# Patient Record
Sex: FEMALE | Race: WHITE | NOT HISPANIC OR LATINO
[De-identification: names, ages, dates, MRNs, and addresses within clinical notes are randomized per-mention and may not be internally consistent; named-entity substitution may affect disease eponyms.]

---

## 2017-01-06 ENCOUNTER — APPOINTMENT (OUTPATIENT)
Dept: INTERNAL MEDICINE | Facility: CLINIC | Age: 82
End: 2017-01-06

## 2017-01-10 ENCOUNTER — APPOINTMENT (OUTPATIENT)
Dept: INTERNAL MEDICINE | Facility: CLINIC | Age: 82
End: 2017-01-10

## 2017-01-10 ENCOUNTER — NON-APPOINTMENT (OUTPATIENT)
Age: 82
End: 2017-01-10

## 2017-01-10 VITALS
TEMPERATURE: 97.6 F | WEIGHT: 129 LBS | DIASTOLIC BLOOD PRESSURE: 70 MMHG | HEART RATE: 73 BPM | BODY MASS INDEX: 21.75 KG/M2 | SYSTOLIC BLOOD PRESSURE: 140 MMHG | OXYGEN SATURATION: 95 % | HEIGHT: 64.5 IN

## 2017-01-10 DIAGNOSIS — N63 UNSPECIFIED LUMP IN BREAST: ICD-10-CM

## 2017-01-10 DIAGNOSIS — K21.9 GASTRO-ESOPHAGEAL REFLUX DISEASE W/OUT ESOPHAGITIS: ICD-10-CM

## 2017-01-10 DIAGNOSIS — Z23 ENCOUNTER FOR IMMUNIZATION: ICD-10-CM

## 2017-01-10 DIAGNOSIS — Z87.09 PERSONAL HISTORY OF OTHER DISEASES OF THE RESPIRATORY SYSTEM: ICD-10-CM

## 2017-01-19 ENCOUNTER — MEDICATION RENEWAL (OUTPATIENT)
Age: 82
End: 2017-01-19

## 2017-01-20 ENCOUNTER — CHART COPY (OUTPATIENT)
Age: 82
End: 2017-01-20

## 2017-01-20 RX ORDER — METOPROLOL SUCCINATE 25 MG/1
25 TABLET, EXTENDED RELEASE ORAL
Refills: 0 | Status: ACTIVE | COMMUNITY

## 2017-01-27 ENCOUNTER — MEDICATION RENEWAL (OUTPATIENT)
Age: 82
End: 2017-01-27

## 2017-03-30 ENCOUNTER — APPOINTMENT (OUTPATIENT)
Dept: DERMATOLOGY | Facility: CLINIC | Age: 82
End: 2017-03-30

## 2017-04-28 ENCOUNTER — MEDICATION RENEWAL (OUTPATIENT)
Age: 82
End: 2017-04-28

## 2017-07-12 ENCOUNTER — MEDICATION RENEWAL (OUTPATIENT)
Age: 82
End: 2017-07-12

## 2017-07-28 ENCOUNTER — MEDICATION RENEWAL (OUTPATIENT)
Age: 82
End: 2017-07-28

## 2017-08-15 ENCOUNTER — APPOINTMENT (OUTPATIENT)
Dept: INTERNAL MEDICINE | Facility: CLINIC | Age: 82
End: 2017-08-15
Payer: MEDICARE

## 2017-08-15 VITALS
OXYGEN SATURATION: 96 % | WEIGHT: 131 LBS | DIASTOLIC BLOOD PRESSURE: 80 MMHG | HEART RATE: 78 BPM | TEMPERATURE: 97.6 F | HEIGHT: 64.5 IN | SYSTOLIC BLOOD PRESSURE: 110 MMHG | BODY MASS INDEX: 22.09 KG/M2

## 2017-08-15 DIAGNOSIS — R26.89 OTHER ABNORMALITIES OF GAIT AND MOBILITY: ICD-10-CM

## 2017-08-15 DIAGNOSIS — H61.20 IMPACTED CERUMEN, UNSPECIFIED EAR: ICD-10-CM

## 2017-08-15 PROCEDURE — 99215 OFFICE O/P EST HI 40 MIN: CPT

## 2017-08-15 RX ORDER — OXYCODONE AND ACETAMINOPHEN 5; 325 MG/1; MG/1
5-325 TABLET ORAL
Qty: 6 | Refills: 0 | Status: COMPLETED | COMMUNITY
Start: 2017-05-03

## 2017-11-02 ENCOUNTER — APPOINTMENT (OUTPATIENT)
Dept: INTERNAL MEDICINE | Facility: CLINIC | Age: 82
End: 2017-11-02
Payer: MEDICARE

## 2017-11-02 DIAGNOSIS — Z23 ENCOUNTER FOR IMMUNIZATION: ICD-10-CM

## 2017-11-02 PROCEDURE — 90662 IIV NO PRSV INCREASED AG IM: CPT

## 2017-11-02 PROCEDURE — G0008: CPT

## 2017-12-01 ENCOUNTER — RX RENEWAL (OUTPATIENT)
Age: 82
End: 2017-12-01

## 2017-12-05 ENCOUNTER — APPOINTMENT (OUTPATIENT)
Dept: OBGYN | Facility: CLINIC | Age: 82
End: 2017-12-05
Payer: MEDICARE

## 2017-12-05 VITALS
HEIGHT: 64.5 IN | DIASTOLIC BLOOD PRESSURE: 80 MMHG | WEIGHT: 130 LBS | SYSTOLIC BLOOD PRESSURE: 110 MMHG | BODY MASS INDEX: 21.93 KG/M2

## 2017-12-05 DIAGNOSIS — Z01.419 ENCOUNTER FOR GYNECOLOGICAL EXAMINATION (GENERAL) (ROUTINE) W/OUT ABNORMAL FINDINGS: ICD-10-CM

## 2017-12-05 PROCEDURE — 82270 OCCULT BLOOD FECES: CPT

## 2017-12-05 PROCEDURE — G0101: CPT

## 2017-12-05 PROCEDURE — 99203 OFFICE O/P NEW LOW 30 MIN: CPT | Mod: 25

## 2017-12-27 ENCOUNTER — MEDICATION RENEWAL (OUTPATIENT)
Age: 82
End: 2017-12-27

## 2018-04-09 ENCOUNTER — APPOINTMENT (OUTPATIENT)
Dept: INTERNAL MEDICINE | Facility: CLINIC | Age: 83
End: 2018-04-09
Payer: MEDICARE

## 2018-04-09 ENCOUNTER — NON-APPOINTMENT (OUTPATIENT)
Age: 83
End: 2018-04-09

## 2018-04-09 VITALS
WEIGHT: 136 LBS | OXYGEN SATURATION: 94 % | SYSTOLIC BLOOD PRESSURE: 120 MMHG | DIASTOLIC BLOOD PRESSURE: 60 MMHG | BODY MASS INDEX: 22.94 KG/M2 | HEIGHT: 64.5 IN | TEMPERATURE: 98 F | HEART RATE: 82 BPM

## 2018-04-09 DIAGNOSIS — J30.1 ALLERGIC RHINITIS DUE TO POLLEN: ICD-10-CM

## 2018-04-09 DIAGNOSIS — Z87.19 PERSONAL HISTORY OF OTHER DISEASES OF THE DIGESTIVE SYSTEM: ICD-10-CM

## 2018-04-09 DIAGNOSIS — H10.12 ACUTE ATOPIC CONJUNCTIVITIS, LEFT EYE: ICD-10-CM

## 2018-04-09 PROCEDURE — G0444 DEPRESSION SCREEN ANNUAL: CPT

## 2018-04-09 PROCEDURE — 93000 ELECTROCARDIOGRAM COMPLETE: CPT

## 2018-04-09 PROCEDURE — 92552 PURE TONE AUDIOMETRY AIR: CPT

## 2018-04-09 PROCEDURE — G0439: CPT | Mod: 25

## 2018-04-09 PROCEDURE — 94010 BREATHING CAPACITY TEST: CPT

## 2018-04-09 PROCEDURE — 36415 COLL VENOUS BLD VENIPUNCTURE: CPT

## 2018-04-09 RX ORDER — TRIAMCINOLONE ACETONIDE 1 MG/G
0.1 CREAM TOPICAL
Qty: 30 | Refills: 0 | Status: COMPLETED | COMMUNITY
Start: 2017-10-31

## 2018-04-09 RX ORDER — NYSTATIN 100000 [USP'U]/G
100000 CREAM TOPICAL
Qty: 30 | Refills: 0 | Status: COMPLETED | COMMUNITY
Start: 2017-10-31

## 2018-04-10 ENCOUNTER — RESULT REVIEW (OUTPATIENT)
Age: 83
End: 2018-04-10

## 2018-04-10 ENCOUNTER — APPOINTMENT (OUTPATIENT)
Dept: DERMATOLOGY | Facility: CLINIC | Age: 83
End: 2018-04-10
Payer: MEDICARE

## 2018-04-10 LAB
APPEARANCE: CLEAR
BASOPHILS # BLD AUTO: 0.07 K/UL
BASOPHILS NFR BLD AUTO: 1.3 %
BILIRUBIN URINE: NEGATIVE
BLOOD URINE: NEGATIVE
COLOR: YELLOW
EOSINOPHIL # BLD AUTO: 0.23 K/UL
EOSINOPHIL NFR BLD AUTO: 4.4 %
GLUCOSE QUALITATIVE U: NEGATIVE MG/DL
HCT VFR BLD CALC: 45 %
HGB BLD-MCNC: 14.2 G/DL
IMM GRANULOCYTES NFR BLD AUTO: 0.2 %
KETONES URINE: ABNORMAL
LEUKOCYTE ESTERASE URINE: NEGATIVE
LYMPHOCYTES # BLD AUTO: 1.71 K/UL
LYMPHOCYTES NFR BLD AUTO: 32.8 %
MAN DIFF?: NORMAL
MCHC RBC-ENTMCNC: 30.3 PG
MCHC RBC-ENTMCNC: 31.6 GM/DL
MCV RBC AUTO: 96.2 FL
MONOCYTES # BLD AUTO: 0.58 K/UL
MONOCYTES NFR BLD AUTO: 11.1 %
NEUTROPHILS # BLD AUTO: 2.61 K/UL
NEUTROPHILS NFR BLD AUTO: 50.2 %
NITRITE URINE: NEGATIVE
PH URINE: 5
PLATELET # BLD AUTO: 221 K/UL
PROTEIN URINE: NEGATIVE MG/DL
RBC # BLD: 4.68 M/UL
RBC # FLD: 13.5 %
SPECIFIC GRAVITY URINE: 1.02
UROBILINOGEN URINE: 1 MG/DL
WBC # FLD AUTO: 5.21 K/UL

## 2018-04-10 PROCEDURE — 17260 DSTRJ MAL LES T/A/L 0.5 CM/<: CPT

## 2018-04-10 PROCEDURE — 99213 OFFICE O/P EST LOW 20 MIN: CPT | Mod: 25

## 2018-04-11 LAB
25(OH)D3 SERPL-MCNC: 26.6 NG/ML
ALBUMIN SERPL ELPH-MCNC: 4.2 G/DL
ALP BLD-CCNC: 92 U/L
ALT SERPL-CCNC: 4 U/L
ANION GAP SERPL CALC-SCNC: 14 MMOL/L
AST SERPL-CCNC: 18 U/L
BILIRUB SERPL-MCNC: 0.9 MG/DL
BUN SERPL-MCNC: 17 MG/DL
CALCIUM SERPL-MCNC: 9.2 MG/DL
CHLORIDE SERPL-SCNC: 105 MMOL/L
CHOLEST SERPL-MCNC: 158 MG/DL
CHOLEST/HDLC SERPL: 1.9 RATIO
CK SERPL-CCNC: 81 U/L
CO2 SERPL-SCNC: 26 MMOL/L
CREAT SERPL-MCNC: 0.93 MG/DL
GLUCOSE SERPL-MCNC: 93 MG/DL
HDLC SERPL-MCNC: 83 MG/DL
LDLC SERPL CALC-MCNC: 66 MG/DL
POTASSIUM SERPL-SCNC: 3.9 MMOL/L
PROT SERPL-MCNC: 7 G/DL
SODIUM SERPL-SCNC: 145 MMOL/L
TRIGL SERPL-MCNC: 43 MG/DL
TSH SERPL-ACNC: 1.37 UIU/ML
URATE SERPL-MCNC: 4.9 MG/DL

## 2018-04-13 ENCOUNTER — MEDICATION RENEWAL (OUTPATIENT)
Age: 83
End: 2018-04-13

## 2018-04-25 ENCOUNTER — MEDICATION RENEWAL (OUTPATIENT)
Age: 83
End: 2018-04-25

## 2018-05-08 ENCOUNTER — APPOINTMENT (OUTPATIENT)
Dept: UROGYNECOLOGY | Facility: CLINIC | Age: 83
End: 2018-05-08
Payer: MEDICARE

## 2018-05-08 VITALS
WEIGHT: 133 LBS | DIASTOLIC BLOOD PRESSURE: 72 MMHG | HEIGHT: 64 IN | BODY MASS INDEX: 22.71 KG/M2 | SYSTOLIC BLOOD PRESSURE: 140 MMHG

## 2018-05-08 DIAGNOSIS — Z85.9 PERSONAL HISTORY OF MALIGNANT NEOPLASM, UNSPECIFIED: ICD-10-CM

## 2018-05-08 DIAGNOSIS — Z86.79 PERSONAL HISTORY OF OTHER DISEASES OF THE CIRCULATORY SYSTEM: ICD-10-CM

## 2018-05-08 DIAGNOSIS — Z87.09 PERSONAL HISTORY OF OTHER DISEASES OF THE RESPIRATORY SYSTEM: ICD-10-CM

## 2018-05-08 DIAGNOSIS — Z87.19 PERSONAL HISTORY OF OTHER DISEASES OF THE DIGESTIVE SYSTEM: ICD-10-CM

## 2018-05-08 DIAGNOSIS — I51.9 HEART DISEASE, UNSPECIFIED: ICD-10-CM

## 2018-05-08 DIAGNOSIS — Z60.2 PROBLEMS RELATED TO LIVING ALONE: ICD-10-CM

## 2018-05-08 DIAGNOSIS — M53.9 DORSOPATHY, UNSPECIFIED: ICD-10-CM

## 2018-05-08 LAB
BILIRUB UR QL STRIP: NEGATIVE
CLARITY UR: CLEAR
COLLECTION METHOD: NORMAL
GLUCOSE UR-MCNC: NEGATIVE
HCG UR QL: 0.2 EU/DL
HGB UR QL STRIP.AUTO: NORMAL
KETONES UR-MCNC: NEGATIVE
LEUKOCYTE ESTERASE UR QL STRIP: NEGATIVE
NITRITE UR QL STRIP: NEGATIVE
PH UR STRIP: 6
PROT UR STRIP-MCNC: NEGATIVE
SP GR UR STRIP: 1.02

## 2018-05-08 PROCEDURE — 81003 URINALYSIS AUTO W/O SCOPE: CPT | Mod: QW

## 2018-05-08 PROCEDURE — 51701 INSERT BLADDER CATHETER: CPT

## 2018-05-08 PROCEDURE — 99204 OFFICE O/P NEW MOD 45 MIN: CPT | Mod: 25

## 2018-05-08 PROCEDURE — 99214 OFFICE O/P EST MOD 30 MIN: CPT | Mod: 25

## 2018-05-08 SDOH — SOCIAL STABILITY - SOCIAL INSECURITY: PROBLEMS RELATED TO LIVING ALONE: Z60.2

## 2018-05-09 ENCOUNTER — RESULT REVIEW (OUTPATIENT)
Age: 83
End: 2018-05-09

## 2018-05-09 LAB
APPEARANCE: CLEAR
BACTERIA: NEGATIVE
BILIRUBIN URINE: NEGATIVE
BLOOD URINE: NEGATIVE
COLOR: YELLOW
GLUCOSE QUALITATIVE U: NEGATIVE MG/DL
HYALINE CASTS: 1 /LPF
KETONES URINE: NEGATIVE
LEUKOCYTE ESTERASE URINE: NEGATIVE
MICROSCOPIC-UA: NORMAL
NITRITE URINE: NEGATIVE
PH URINE: 6
PROTEIN URINE: NEGATIVE MG/DL
RED BLOOD CELLS URINE: 1 /HPF
SPECIFIC GRAVITY URINE: 1.01
SQUAMOUS EPITHELIAL CELLS: 2 /HPF
UROBILINOGEN URINE: NEGATIVE MG/DL
WHITE BLOOD CELLS URINE: 0 /HPF

## 2018-05-10 ENCOUNTER — RESULT REVIEW (OUTPATIENT)
Age: 83
End: 2018-05-10

## 2018-05-10 LAB
BACTERIA UR CULT: NORMAL
CORE LAB FLUID CYTOLOGY: NORMAL

## 2018-06-12 ENCOUNTER — APPOINTMENT (OUTPATIENT)
Dept: UROGYNECOLOGY | Facility: CLINIC | Age: 83
End: 2018-06-12

## 2018-06-26 ENCOUNTER — APPOINTMENT (OUTPATIENT)
Dept: UROGYNECOLOGY | Facility: CLINIC | Age: 83
End: 2018-06-26
Payer: MEDICARE

## 2018-06-26 LAB
BILIRUB UR QL STRIP: NEGATIVE
CLARITY UR: NORMAL
COLLECTION METHOD: NORMAL
GLUCOSE UR-MCNC: NEGATIVE
HCG UR QL: 0.2 EU/DL
HGB UR QL STRIP.AUTO: NORMAL
KETONES UR-MCNC: NEGATIVE
LEUKOCYTE ESTERASE UR QL STRIP: NEGATIVE
NITRITE UR QL STRIP: NEGATIVE
PH UR STRIP: 5.5
PROT UR STRIP-MCNC: NEGATIVE
SP GR UR STRIP: 1.02

## 2018-06-26 PROCEDURE — 99211 OFF/OP EST MAY X REQ PHY/QHP: CPT | Mod: 25

## 2018-06-26 PROCEDURE — 81003 URINALYSIS AUTO W/O SCOPE: CPT | Mod: QW

## 2018-06-26 PROCEDURE — 56605 BIOPSY OF VULVA/PERINEUM: CPT

## 2018-06-29 LAB — CORE LAB BIOPSY: NORMAL

## 2018-07-24 ENCOUNTER — APPOINTMENT (OUTPATIENT)
Dept: INTERNAL MEDICINE | Facility: CLINIC | Age: 83
End: 2018-07-24
Payer: MEDICARE

## 2018-07-24 VITALS
OXYGEN SATURATION: 94 % | SYSTOLIC BLOOD PRESSURE: 120 MMHG | WEIGHT: 132 LBS | HEART RATE: 81 BPM | DIASTOLIC BLOOD PRESSURE: 70 MMHG | BODY MASS INDEX: 22.53 KG/M2 | TEMPERATURE: 97.8 F | HEIGHT: 64 IN

## 2018-07-24 DIAGNOSIS — H61.20 IMPACTED CERUMEN, UNSPECIFIED EAR: ICD-10-CM

## 2018-07-24 PROCEDURE — 99214 OFFICE O/P EST MOD 30 MIN: CPT

## 2018-07-24 NOTE — PROCEDURE
[FreeTextEntry3] : Pt is here for ear lavage\par large amount of cerumen removed from the left ear > right ear.\par small amount of bleeding noted with some erythema on the TM. No other problems and the patient is hearing better.

## 2018-07-25 ENCOUNTER — MEDICATION RENEWAL (OUTPATIENT)
Age: 83
End: 2018-07-25

## 2018-09-05 ENCOUNTER — APPOINTMENT (OUTPATIENT)
Dept: DERMATOLOGY | Facility: CLINIC | Age: 83
End: 2018-09-05
Payer: MEDICARE

## 2018-09-05 PROCEDURE — 99213 OFFICE O/P EST LOW 20 MIN: CPT

## 2018-09-19 ENCOUNTER — APPOINTMENT (OUTPATIENT)
Dept: UROGYNECOLOGY | Facility: CLINIC | Age: 83
End: 2018-09-19
Payer: MEDICARE

## 2018-09-19 PROCEDURE — 99213 OFFICE O/P EST LOW 20 MIN: CPT

## 2018-10-30 ENCOUNTER — APPOINTMENT (OUTPATIENT)
Dept: INTERNAL MEDICINE | Facility: CLINIC | Age: 83
End: 2018-10-30
Payer: MEDICARE

## 2018-10-30 PROCEDURE — G0008: CPT

## 2018-10-30 PROCEDURE — 90662 IIV NO PRSV INCREASED AG IM: CPT

## 2018-11-09 ENCOUNTER — APPOINTMENT (OUTPATIENT)
Dept: UROGYNECOLOGY | Facility: CLINIC | Age: 83
End: 2018-11-09
Payer: MEDICARE

## 2018-11-09 PROCEDURE — 99213 OFFICE O/P EST LOW 20 MIN: CPT

## 2018-12-22 ENCOUNTER — MEDICATION RENEWAL (OUTPATIENT)
Age: 83
End: 2018-12-22

## 2019-01-08 ENCOUNTER — APPOINTMENT (OUTPATIENT)
Dept: INTERNAL MEDICINE | Facility: CLINIC | Age: 84
End: 2019-01-08
Payer: MEDICARE

## 2019-01-08 VITALS
HEART RATE: 90 BPM | SYSTOLIC BLOOD PRESSURE: 110 MMHG | OXYGEN SATURATION: 93 % | DIASTOLIC BLOOD PRESSURE: 60 MMHG | BODY MASS INDEX: 22.53 KG/M2 | WEIGHT: 132 LBS | HEIGHT: 64 IN | TEMPERATURE: 97.7 F

## 2019-01-08 DIAGNOSIS — J32.9 CHRONIC SINUSITIS, UNSPECIFIED: ICD-10-CM

## 2019-01-08 DIAGNOSIS — R05 COUGH: ICD-10-CM

## 2019-01-08 DIAGNOSIS — J45.909 UNSPECIFIED ASTHMA, UNCOMPLICATED: ICD-10-CM

## 2019-01-08 PROCEDURE — 99214 OFFICE O/P EST MOD 30 MIN: CPT

## 2019-01-08 RX ORDER — AMOXICILLIN 500 MG/1
500 CAPSULE ORAL
Qty: 4 | Refills: 2 | Status: DISCONTINUED | COMMUNITY
Start: 2017-07-12 | End: 2019-01-08

## 2019-01-08 RX ORDER — HYDROCORTISONE 2.5% 25 MG/G
2.5 CREAM TOPICAL TWICE DAILY
Qty: 1 | Refills: 0 | Status: DISCONTINUED | COMMUNITY
Start: 2017-01-10 | End: 2019-01-08

## 2019-01-08 RX ORDER — CEPHALEXIN 500 MG/1
500 CAPSULE ORAL 3 TIMES DAILY
Qty: 30 | Refills: 0 | Status: DISCONTINUED | COMMUNITY
Start: 2018-04-13 | End: 2019-01-08

## 2019-04-05 ENCOUNTER — APPOINTMENT (OUTPATIENT)
Dept: DERMATOLOGY | Facility: CLINIC | Age: 84
End: 2019-04-05
Payer: MEDICARE

## 2019-04-05 PROCEDURE — 99214 OFFICE O/P EST MOD 30 MIN: CPT

## 2019-04-19 ENCOUNTER — APPOINTMENT (OUTPATIENT)
Dept: UROGYNECOLOGY | Facility: CLINIC | Age: 84
End: 2019-04-19
Payer: MEDICARE

## 2019-04-19 ENCOUNTER — CLINICAL ADVICE (OUTPATIENT)
Age: 84
End: 2019-04-19

## 2019-04-19 PROCEDURE — 51701 INSERT BLADDER CATHETER: CPT

## 2019-04-19 PROCEDURE — 99212 OFFICE O/P EST SF 10 MIN: CPT | Mod: 25

## 2019-04-19 NOTE — HISTORY OF PRESENT ILLNESS
[FreeTextEntry1] : Parkinson's, CHAVEZ bothersome, atrophy, urethral caruncle, and LS. Not currently using topical estrogen cream or clobetasol anymore. ? symptoms of dysuria, PMD rx abx but she didn't want to take or fill rx until coming in today for cath specimen. No hematuria, no fever/chills, no flank pain.

## 2019-04-19 NOTE — PROCEDURE
[Straight Catheterization] : insertion of a straight catheter [Urinary Tract Infection] : a urinary tract infection [Urgent Incontinence] : urgent incontinence [Stress Incontinence] : stress incontinence [Urinary Frequency] : urinary frequency [Patient] : the patient [None] : there were no complications with the catheter insertion [___ Fr Straight Tip] : a [unfilled] in [place] straight tip catheter [Clear] : clear [Culture] : culture [No Complications] : no complications [Tolerated Well] : the patient tolerated the procedure well [1] : 1 [Post procedure instructions and information given] : Post procedure instructions and information were given and reviewed with patient. [FreeTextEntry1] : cannot provide uncontaminated clean catch therefore she was catheterized

## 2019-04-19 NOTE — ASSESSMENT
[FreeTextEntry1] : CHAVEZ - RTO for UDS to further eval, triage procedural management thereafter. Med management limited by Parkinson's and meds interactions.\par \par atrophy and caruncle - restart topical estrogen.\par \par LS - clobetasol.\par \par ?UTI - f/u today's cath UCx and treat prn.

## 2019-04-21 LAB — BACTERIA UR CULT: NORMAL

## 2019-05-07 ENCOUNTER — APPOINTMENT (OUTPATIENT)
Dept: UROGYNECOLOGY | Facility: CLINIC | Age: 84
End: 2019-05-07
Payer: MEDICARE

## 2019-05-07 PROCEDURE — 51797 INTRAABDOMINAL PRESSURE TEST: CPT

## 2019-05-07 PROCEDURE — 51784 ANAL/URINARY MUSCLE STUDY: CPT

## 2019-05-07 PROCEDURE — 51741 ELECTRO-UROFLOWMETRY FIRST: CPT

## 2019-05-07 PROCEDURE — 51729 CYSTOMETROGRAM W/VP&UP: CPT

## 2019-05-17 ENCOUNTER — APPOINTMENT (OUTPATIENT)
Dept: UROGYNECOLOGY | Facility: CLINIC | Age: 84
End: 2019-05-17
Payer: MEDICARE

## 2019-05-31 ENCOUNTER — APPOINTMENT (OUTPATIENT)
Dept: UROGYNECOLOGY | Facility: CLINIC | Age: 84
End: 2019-05-31
Payer: MEDICARE

## 2019-05-31 VITALS
DIASTOLIC BLOOD PRESSURE: 78 MMHG | WEIGHT: 132 LBS | SYSTOLIC BLOOD PRESSURE: 131 MMHG | HEIGHT: 64 IN | BODY MASS INDEX: 22.53 KG/M2

## 2019-05-31 PROCEDURE — 99213 OFFICE O/P EST LOW 20 MIN: CPT

## 2019-05-31 NOTE — HISTORY OF PRESENT ILLNESS
[FreeTextEntry1] : Atrophy, urethral caruncle - stopped using topical estrogen (has estrace supply) and clobetasol (has supply). Feels occasional labial itch. No bleeding. Sexually active. \par \par Lichen sclerosis (bx-confirmed).\par \par Hx Parkinson's on meds.\par \par Bothersome CHAVEZ, underwent recent UDS - PVR normal, 177 first sens / capac normal 430, no DO, +GSUI (LPP 60cm).\par \par Last visit 4/19 UCx neg.

## 2019-05-31 NOTE — ASSESSMENT
[FreeTextEntry1] : CHAVEZ, YAW > OAB. UDS +GSUI and ISD.\par \par GSUI management options including observation, behavioral modifications, medication, pessary, Impressa insert, periurethral bulking via cystoscopy, and surgery with midurethral sling were reviewed. She is interested in Macroplastique. Risks such as hematuria, UTIs, migration, allergy, spread, failure, recurrence discussed. She cannot perofrm CIC, therefore would need cath prn retention. She understood. RTO for macroplastique periurethral bulking injections.\par \par Go back to twice weekly estrace and clobetasol. SEs and risks reviewed. All questions answered.\par

## 2019-06-06 ENCOUNTER — INPATIENT (INPATIENT)
Facility: HOSPITAL | Age: 84
LOS: 4 days | Discharge: NURSING, REHAB OR LONG-TERM CARE | DRG: 482 | End: 2019-06-11
Attending: INTERNAL MEDICINE | Admitting: INTERNAL MEDICINE
Payer: MEDICARE

## 2019-06-06 ENCOUNTER — TRANSCRIPTION ENCOUNTER (OUTPATIENT)
Age: 84
End: 2019-06-06

## 2019-06-06 VITALS — WEIGHT: 130.07 LBS | HEIGHT: 65 IN

## 2019-06-06 DIAGNOSIS — S72.009A FRACTURE OF UNSPECIFIED PART OF NECK OF UNSPECIFIED FEMUR, INITIAL ENCOUNTER FOR CLOSED FRACTURE: ICD-10-CM

## 2019-06-06 LAB
ALBUMIN SERPL ELPH-MCNC: 4 G/DL — SIGNIFICANT CHANGE UP (ref 3.3–5.2)
ALP SERPL-CCNC: 83 U/L — SIGNIFICANT CHANGE UP (ref 40–120)
ALT FLD-CCNC: <5 U/L — SIGNIFICANT CHANGE UP
ANION GAP SERPL CALC-SCNC: 14 MMOL/L — SIGNIFICANT CHANGE UP (ref 5–17)
APTT BLD: 28.4 SEC — SIGNIFICANT CHANGE UP (ref 27.5–36.3)
AST SERPL-CCNC: 19 U/L — SIGNIFICANT CHANGE UP
BASOPHILS # BLD AUTO: 0 K/UL — SIGNIFICANT CHANGE UP (ref 0–0.2)
BASOPHILS NFR BLD AUTO: 0.4 % — SIGNIFICANT CHANGE UP (ref 0–2)
BILIRUB SERPL-MCNC: 1 MG/DL — SIGNIFICANT CHANGE UP (ref 0.4–2)
BUN SERPL-MCNC: 22 MG/DL — HIGH (ref 8–20)
CALCIUM SERPL-MCNC: 9.4 MG/DL — SIGNIFICANT CHANGE UP (ref 8.6–10.2)
CHLORIDE SERPL-SCNC: 103 MMOL/L — SIGNIFICANT CHANGE UP (ref 98–107)
CO2 SERPL-SCNC: 23 MMOL/L — SIGNIFICANT CHANGE UP (ref 22–29)
CREAT SERPL-MCNC: 0.82 MG/DL — SIGNIFICANT CHANGE UP (ref 0.5–1.3)
EOSINOPHIL # BLD AUTO: 0.2 K/UL — SIGNIFICANT CHANGE UP (ref 0–0.5)
EOSINOPHIL NFR BLD AUTO: 1.9 % — SIGNIFICANT CHANGE UP (ref 0–6)
GLUCOSE SERPL-MCNC: 102 MG/DL — SIGNIFICANT CHANGE UP (ref 70–115)
HCT VFR BLD CALC: 42.9 % — SIGNIFICANT CHANGE UP (ref 37–47)
HGB BLD-MCNC: 14.3 G/DL — SIGNIFICANT CHANGE UP (ref 12–16)
INR BLD: 1.07 RATIO — SIGNIFICANT CHANGE UP (ref 0.88–1.16)
LYMPHOCYTES # BLD AUTO: 1.6 K/UL — SIGNIFICANT CHANGE UP (ref 1–4.8)
LYMPHOCYTES # BLD AUTO: 20.5 % — SIGNIFICANT CHANGE UP (ref 20–55)
MCHC RBC-ENTMCNC: 30.9 PG — SIGNIFICANT CHANGE UP (ref 27–31)
MCHC RBC-ENTMCNC: 33.3 G/DL — SIGNIFICANT CHANGE UP (ref 32–36)
MCV RBC AUTO: 92.7 FL — SIGNIFICANT CHANGE UP (ref 81–99)
MONOCYTES # BLD AUTO: 0.9 K/UL — HIGH (ref 0–0.8)
MONOCYTES NFR BLD AUTO: 11.5 % — HIGH (ref 3–10)
NEUTROPHILS # BLD AUTO: 5.2 K/UL — SIGNIFICANT CHANGE UP (ref 1.8–8)
NEUTROPHILS NFR BLD AUTO: 65.3 % — SIGNIFICANT CHANGE UP (ref 37–73)
PLATELET # BLD AUTO: 200 K/UL — SIGNIFICANT CHANGE UP (ref 150–400)
POTASSIUM SERPL-MCNC: 4 MMOL/L — SIGNIFICANT CHANGE UP (ref 3.5–5.3)
POTASSIUM SERPL-SCNC: 4 MMOL/L — SIGNIFICANT CHANGE UP (ref 3.5–5.3)
PROT SERPL-MCNC: 6.9 G/DL — SIGNIFICANT CHANGE UP (ref 6.6–8.7)
PROTHROM AB SERPL-ACNC: 12.3 SEC — SIGNIFICANT CHANGE UP (ref 10–12.9)
RBC # BLD: 4.63 M/UL — SIGNIFICANT CHANGE UP (ref 4.4–5.2)
RBC # FLD: 13.1 % — SIGNIFICANT CHANGE UP (ref 11–15.6)
SODIUM SERPL-SCNC: 140 MMOL/L — SIGNIFICANT CHANGE UP (ref 135–145)
TYPE + AB SCN PNL BLD: SIGNIFICANT CHANGE UP
WBC # BLD: 8 K/UL — SIGNIFICANT CHANGE UP (ref 4.8–10.8)
WBC # FLD AUTO: 8 K/UL — SIGNIFICANT CHANGE UP (ref 4.8–10.8)

## 2019-06-06 PROCEDURE — 93010 ELECTROCARDIOGRAM REPORT: CPT | Mod: 76

## 2019-06-06 PROCEDURE — 76377 3D RENDER W/INTRP POSTPROCES: CPT | Mod: 26

## 2019-06-06 PROCEDURE — 99222 1ST HOSP IP/OBS MODERATE 55: CPT

## 2019-06-06 PROCEDURE — 99221 1ST HOSP IP/OBS SF/LOW 40: CPT | Mod: 57

## 2019-06-06 PROCEDURE — 73502 X-RAY EXAM HIP UNI 2-3 VIEWS: CPT | Mod: 26,RT

## 2019-06-06 PROCEDURE — 73552 X-RAY EXAM OF FEMUR 2/>: CPT | Mod: 26,RT

## 2019-06-06 PROCEDURE — 72192 CT PELVIS W/O DYE: CPT | Mod: 26

## 2019-06-06 PROCEDURE — 99285 EMERGENCY DEPT VISIT HI MDM: CPT

## 2019-06-06 PROCEDURE — 70450 CT HEAD/BRAIN W/O DYE: CPT | Mod: 26

## 2019-06-06 PROCEDURE — 72125 CT NECK SPINE W/O DYE: CPT | Mod: 26

## 2019-06-06 PROCEDURE — 71045 X-RAY EXAM CHEST 1 VIEW: CPT | Mod: 26

## 2019-06-06 RX ORDER — ENOXAPARIN SODIUM 100 MG/ML
40 INJECTION SUBCUTANEOUS ONCE
Refills: 0 | Status: COMPLETED | OUTPATIENT
Start: 2019-06-06 | End: 2019-06-06

## 2019-06-06 RX ORDER — ACETAMINOPHEN 500 MG
650 TABLET ORAL EVERY 6 HOURS
Refills: 0 | Status: DISCONTINUED | OUTPATIENT
Start: 2019-06-06 | End: 2019-06-07

## 2019-06-06 RX ORDER — CARBIDOPA AND LEVODOPA 25; 100 MG/1; MG/1
1 TABLET ORAL
Refills: 0 | Status: DISCONTINUED | OUTPATIENT
Start: 2019-06-06 | End: 2019-06-07

## 2019-06-06 RX ORDER — ACETAMINOPHEN 500 MG
975 TABLET ORAL ONCE
Refills: 0 | Status: COMPLETED | OUTPATIENT
Start: 2019-06-06 | End: 2019-06-06

## 2019-06-06 RX ORDER — MORPHINE SULFATE 50 MG/1
2 CAPSULE, EXTENDED RELEASE ORAL ONCE
Refills: 0 | Status: DISCONTINUED | OUTPATIENT
Start: 2019-06-06 | End: 2019-06-06

## 2019-06-06 RX ORDER — OXYCODONE HYDROCHLORIDE 5 MG/1
10 TABLET ORAL EVERY 6 HOURS
Refills: 0 | Status: DISCONTINUED | OUTPATIENT
Start: 2019-06-06 | End: 2019-06-07

## 2019-06-06 RX ORDER — METOPROLOL TARTRATE 50 MG
25 TABLET ORAL DAILY
Refills: 0 | Status: DISCONTINUED | OUTPATIENT
Start: 2019-06-07 | End: 2019-06-07

## 2019-06-06 RX ORDER — SODIUM CHLORIDE 9 MG/ML
1000 INJECTION INTRAMUSCULAR; INTRAVENOUS; SUBCUTANEOUS ONCE
Refills: 0 | Status: COMPLETED | OUTPATIENT
Start: 2019-06-06 | End: 2019-06-06

## 2019-06-06 RX ORDER — MORPHINE SULFATE 50 MG/1
1 CAPSULE, EXTENDED RELEASE ORAL ONCE
Refills: 0 | Status: DISCONTINUED | OUTPATIENT
Start: 2019-06-06 | End: 2019-06-07

## 2019-06-06 RX ORDER — FAMOTIDINE 10 MG/ML
20 INJECTION INTRAVENOUS ONCE
Refills: 0 | Status: COMPLETED | OUTPATIENT
Start: 2019-06-06 | End: 2019-06-06

## 2019-06-06 RX ORDER — OXYCODONE HYDROCHLORIDE 5 MG/1
5 TABLET ORAL EVERY 6 HOURS
Refills: 0 | Status: DISCONTINUED | OUTPATIENT
Start: 2019-06-06 | End: 2019-06-07

## 2019-06-06 RX ADMIN — SODIUM CHLORIDE 1000 MILLILITER(S): 9 INJECTION INTRAMUSCULAR; INTRAVENOUS; SUBCUTANEOUS at 18:42

## 2019-06-06 RX ADMIN — OXYCODONE HYDROCHLORIDE 10 MILLIGRAM(S): 5 TABLET ORAL at 20:28

## 2019-06-06 RX ADMIN — OXYCODONE HYDROCHLORIDE 10 MILLIGRAM(S): 5 TABLET ORAL at 19:28

## 2019-06-06 RX ADMIN — SODIUM CHLORIDE 1000 MILLILITER(S): 9 INJECTION INTRAMUSCULAR; INTRAVENOUS; SUBCUTANEOUS at 16:17

## 2019-06-06 RX ADMIN — FAMOTIDINE 20 MILLIGRAM(S): 10 INJECTION INTRAVENOUS at 17:55

## 2019-06-06 RX ADMIN — CARBIDOPA AND LEVODOPA 1 TABLET(S): 25; 100 TABLET ORAL at 20:01

## 2019-06-06 RX ADMIN — Medication 975 MILLIGRAM(S): at 16:17

## 2019-06-06 NOTE — ED PROVIDER NOTE - PHYSICAL EXAMINATION
Gen: NAD  Head: NCAT  HEENT: PERRL, MMM, normal conjunctiva, anicteric, neck supple  Lung: CTAB, no adventitious sounds  CV: RRR, no murmurs, rubs or gallops  Abd: soft, NTND, no rebound or guarding, no CVAT  MSK: R leg shortened, externally rotated. TTP R hip  Neuro: No focal neurologic deficits. CN II-XII grossly intact. 5/5 strength and normal sensation in all extremities.  Skin: Warm and dry, no evidence of rash  Psych: normal mood and affect

## 2019-06-06 NOTE — H&P ADULT - NSHPSOCIALHISTORY_GEN_ALL_CORE
Former smoker quit >30 yrs ago, social alcohol use denies drug use   Baseline ambulates with a walker and independently   Lives alone

## 2019-06-06 NOTE — H&P ADULT - HISTORY OF PRESENT ILLNESS
84 y.o. Female with hx of parkinsons, MR s/p repair who presented s/p mechanical fall with a chief complaint of right hip/ groin pain.  Patient states she fell while walking down her concrete steps. No LOC or syncope or any head trauma. Pt reports she hit her left gluteal area with initial pain in that region thereafter on to the R side. Pt at baseline walks independent and at times ambulates with a walker. Lives in a two story home and has been Patient denies chest pain, SOB, abd pain, N/V, fever, chills, dysuria or any other complaints. All remainder ROS negative. 84 y.o. Female with hx of parkinsons, MR s/p repair, PAF not on AC, GERD who presented s/p mechanical fall with a chief complaint of right hip/ groin pain.  Patient states she fell while walking down her concrete steps. No LOC or syncope or any head trauma. Pt reports she hit her left gluteal area with initial pain in that region thereafter on to the R hip and groin. After the fall she was unable to get up and ambulate and had intense pain. Denies LOC, head trauma. Pt at baseline walks independent and at times ambulates with a walker. Lives in a two story home and has no history of prior falls. States she did not have prior syncope episodes. No fam hx of CAD. Prior tte ~6 months ago. Patient denies chest pain, SOB, abd pain, N/V, fever, chills, dysuria or any other complaints. All remainder ROS negative.

## 2019-06-06 NOTE — ED PROVIDER NOTE - OBJECTIVE STATEMENT
83yo F with hx of mild parkinsons (on levodopa/carbidopa) presents s/p fall. Mechanical fall off of 2 steps. hip R hip, unable to ambulate since. Denies cp/sob/dizziness prior to fall. R hip pain. able to range arm / elbow / L hip  well. EMS called, no pain medications given.

## 2019-06-06 NOTE — H&P ADULT - ASSESSMENT
84y old  Female who presents with a chief complaint of right hip pain found to have right hip femoral neck fx      * NPO after midnight  for OR tomorrow  * IV fluids ordered and to start once NPO  * Pre-operative ABX ordered  * Single dose anticoagulation ordered  * Medical and cardiac clearance requested for procedure  * Bed rest  * Pain control   * CT ordered for further eval of hip fx 84 year old  Female with hx of PAF not on AC (on asa), breast mass, parkinsons dz, GERD and stress incontinence who presents s/p mechanical fall with R sided hip pain with noted R impacted femoral neck fracture.     Admit to any bed     R femoral neck fracture  - R hip/ groin pain control improving  - c/w tylenol prn for mild pain, oxycodone on sliding scale for mod to severe pain and morphine for break through   - pre op abx per ortho   - NPO after midnight with IVF   -accuchecks q8hrs while npo with hypoglycemia protocol   - lovenox 40mg sq x 1 dose ordered thereafter will be held due to pending sx   - Bed rest  - CT ordered for further evaluation which shows R impacted femoral neck fx   -  Medical and cardiac clearance requested for procedure by ortho   - pending surgical intervention by ortho in the am   - Patient is a patient of Dr. Tolbert; [facility] consulted   - Pt at baseline has >4METS score   -EKG completed NSR     - pt reports prior tte completed 6 months ago   - ortho consult noted and appreciated     Parkinsons dz  - pt reports baseline shuffling slow gait   - c/w sinemet po 4x day  - pt follows with neurology as an outpt     PAF  - Not on AC takes asa 162mg po 3x a week -held for procedure to be restarted thereafter  - c/w metoprolol er 25mg po qd with parameters    Left breast mass  - Noted on pmd visit from Wilfredo SUMMERS  - pt to manage and c/w work up as an outpt with pmd    Stress Incontinence  - pt has been having this worked up as an outpt with GYN  - c/w female catheter     GERD  - c/w protonix 40mg po qd      DVT ppx  - Held due to pending sx in the am     Dispo: S/p repair evaluation by PT per ortho, will likely need acute vs BLESSING. Pt at baseline lives alone in a 2 story home, has 2-3 steps to get into her home. Daughter at bedside and updated her in regards to the plan of care. Anticipated discharge in 2-3 days.

## 2019-06-06 NOTE — CONSULT NOTE ADULT - SUBJECTIVE AND OBJECTIVE BOX
Pt Name: GARIMA ADAMS    MRN: [id number]      Patient is a 84y Female presenting to the emergency department with a chief complaint of Right hip/ groin pain s/p fall. Patient states she fell on her steps. No LOC or syncope. States she lost her footing and she fell onto her right side/ buttock. Patient has no complaints other than groin/ hip pain.               REVIEW OF SYSTEMS  General: Denies CP, SOB or abdominal pain 	    Genitourinary:	complains of stress incontinence     Musculoskeletal:	 see HPI    Neurological: Parkinsons  	    ROS is otherwise negative.      PAST MEDICAL & SURGICAL HISTORY:  Parkinson disease  No significant past surgical history      Allergies: amiodarone (Hives)  strawberry (Hives; Urticaria)      Medications: acetaminophen   Tablet .. 650 milliGRAM(s) Oral every 6 hours PRN  enoxaparin Injectable 40 milliGRAM(s) SubCutaneous once  famotidine Injectable 20 milliGRAM(s) IV Push Once  morphine  - Injectable 1 milliGRAM(s) IV Push once PRN  oxyCODONE    IR 5 milliGRAM(s) Oral every 6 hours PRN  oxyCODONE    IR 10 milliGRAM(s) Oral every 6 hours PRN      FAMILY HISTORY:  : non-contributory    Social History:     Ambulation: Walking independently and with Walker                           14.3   8.0   )-----------( 200      ( 06 Jun 2019 15:50 )             42.9     06-06    140  |  103  |  22.0<H>  ----------------------------<  102  4.0   |  23.0  |  0.82    Ca    9.4      06 Jun 2019 15:50    TPro  6.9  /  Alb  4.0  /  TBili  1.0  /  DBili  x   /  AST  19  /  ALT  <5  /  AlkPhos  83  06-06      PHYSICAL EXAM:    Vital Signs Last 24 Hrs  T(C): 36.5 (06 Jun 2019 16:25), Max: 36.5 (06 Jun 2019 16:25)  T(F): 97.7 (06 Jun 2019 16:25), Max: 97.7 (06 Jun 2019 16:25)  HR: 71 (06 Jun 2019 16:25) (71 - 71)  BP: 133/62 (06 Jun 2019 16:25) (133/62 - 133/62)  BP(mean): --  RR: 20 (06 Jun 2019 16:25) (20 - 20)  SpO2: 86% (06 Jun 2019 16:25) (86% - 86%)  Daily Height in cm: 165.1 (06 Jun 2019 15:30)    Daily     Appearance: Alert, responsive, in no acute distress.  Musculoskeletal:  Right Lower Extremity: Mildly shortened and internally rotated. Weak DP pulses bilaterally. Calves soft and supple. Pain with attempted ROM of right leg    Imaging Studies:  EXAM:  XR FEMUR 2 VIEWS RT                         EXAM:  PELVIS                         EXAM:  XR HIP 2-3V RT                          PROCEDURE DATE:  06/06/2019          INTERPRETATION:  X-RAY OF RIGHT HIP.    History: Right hip, pelvis, and right femur injury.    Date and time of exam: 6/6/2019 4:01 PM.    Technique: AP and lateral views were obtained.    Findings: There is an acute fracture at the base of the neck of the femur   with angulation at the fracture site. No evidence of dislocation..    Impression:  Acute fracture at the base of the neck of the femur.          X-RAY PELVIS:    Technique: A single AP view was obtained.    Findings: There is an acute fracture at the base of the neck of the right   femur. No evidence of dislocation. The pelvis is intact without evidence   of fracture. There are degenerative changes at the base of the lumbar   spine. Surgical clips noted in the pelvis. Evidence of stool throughout   the colon consistent with constipation..    Impression:  No evidence of pelvic fracture. Fracture at the base of the right femoral   neck.          X-RAY OF THE RIGHT FEMUR.    Technique: AP and lateral views were obtained.    Findings: Fractured the base of the neck of the femur. Angulation at the   fracture site. The remainder of the femur is intact without additional   fractures..    Impression:   Acute fracture at the base of the neck of the right femur..       JORDI PETERSON M.D., ATTENDING RADIOLOGIST  This document has been electronically signed. Jun 6 2019  4:20PM          A/P:  Pt is a  84y old  Female who presents with a chief complaint of right hip pain found to have right hip femoral neck fx    PLAN:   * NPO after midnight  for OR tomorrow  * IV fluids ordered and to start once NPO  * Pre-operative ABX ordered  * Single dose anticoagulation ordered  * Medical and cardiac clearance requested for procedure  * Bed rest  * Pain control   * CT ordered for further eval of hip fx

## 2019-06-06 NOTE — ED ADULT TRIAGE NOTE - CHIEF COMPLAINT QUOTE
Pt fall down couple stairs and is c/o R hip pain and R foot numbness, pt unable to move R leg. Code trauma B called.

## 2019-06-06 NOTE — ED PROVIDER NOTE - ATTENDING CONTRIBUTION TO CARE
I performed a face to face history and physical exam of the patient and discussed their management with the resident/ACP. I reviewed the resident/ACP's note and agree with the documented findings and plan of care.    PT with fall down 2 stairs on to R hip.  Pt now with R hip pain. no head injury.    physical - R hip shortened and externally rotated. pain on ROM. NVI distally.

## 2019-06-06 NOTE — H&P ADULT - NSICDXPASTMEDICALHX_GEN_ALL_CORE_FT
PAST MEDICAL HISTORY:  Parkinson disease PAST MEDICAL HISTORY:  Chronic GERD     PAF (paroxysmal atrial fibrillation)     Parkinson disease     Stress incontinence

## 2019-06-06 NOTE — H&P ADULT - NSICDXPASTSURGICALHX_GEN_ALL_CORE_FT
PAST SURGICAL HISTORY:  H/O mitral valve repair     S/P hysterectomy     S/P small bowel resection     S/P tonsillectomy

## 2019-06-06 NOTE — CONSULT NOTE ADULT - ATTENDING COMMENTS
Ortho Trauma Attending:  Agree with above PA note.  Note edited where necessary.  CT shows basicervial hip fracture. Will plan for intertrochanteric hip nail tomorrow. Likely before noon. Orthopedic Surgery is ready to proceed with surgery pending medical optimization and adequate Operating Room availability. Risks of surgical delay discussed with other providers and staff. Please call with any questions or concerns.     Orlando Ram MD  Orthopaedic Trauma Surgery

## 2019-06-06 NOTE — ED PROVIDER NOTE - NS ED ROS FT
ROS: denies HA, weakness, dizziness, fevers/chills, nausea/vomiting, chest pain, SOB, diaphoresis, abdominal pain, diarrhea, neuro deficits, dysuria/hematuria, rash    +R hip pain

## 2019-06-07 DIAGNOSIS — Z90.49 ACQUIRED ABSENCE OF OTHER SPECIFIED PARTS OF DIGESTIVE TRACT: Chronic | ICD-10-CM

## 2019-06-07 DIAGNOSIS — Z98.890 OTHER SPECIFIED POSTPROCEDURAL STATES: Chronic | ICD-10-CM

## 2019-06-07 DIAGNOSIS — Z90.89 ACQUIRED ABSENCE OF OTHER ORGANS: Chronic | ICD-10-CM

## 2019-06-07 DIAGNOSIS — Z90.710 ACQUIRED ABSENCE OF BOTH CERVIX AND UTERUS: Chronic | ICD-10-CM

## 2019-06-07 LAB
ANION GAP SERPL CALC-SCNC: 11 MMOL/L — SIGNIFICANT CHANGE UP (ref 5–17)
BASOPHILS # BLD AUTO: 0 K/UL — SIGNIFICANT CHANGE UP (ref 0–0.2)
BASOPHILS NFR BLD AUTO: 0.3 % — SIGNIFICANT CHANGE UP (ref 0–2)
BUN SERPL-MCNC: 12 MG/DL — SIGNIFICANT CHANGE UP (ref 8–20)
CALCIUM SERPL-MCNC: 7.8 MG/DL — LOW (ref 8.6–10.2)
CHLORIDE SERPL-SCNC: 109 MMOL/L — HIGH (ref 98–107)
CO2 SERPL-SCNC: 21 MMOL/L — LOW (ref 22–29)
CREAT SERPL-MCNC: 0.65 MG/DL — SIGNIFICANT CHANGE UP (ref 0.5–1.3)
EOSINOPHIL # BLD AUTO: 0.2 K/UL — SIGNIFICANT CHANGE UP (ref 0–0.5)
EOSINOPHIL NFR BLD AUTO: 2.1 % — SIGNIFICANT CHANGE UP (ref 0–6)
GLUCOSE BLDC GLUCOMTR-MCNC: 160 MG/DL — HIGH (ref 70–99)
GLUCOSE SERPL-MCNC: 220 MG/DL — HIGH (ref 70–115)
HCT VFR BLD CALC: 39.8 % — SIGNIFICANT CHANGE UP (ref 37–47)
HGB BLD-MCNC: 12.9 G/DL — SIGNIFICANT CHANGE UP (ref 12–16)
LYMPHOCYTES # BLD AUTO: 0.7 K/UL — LOW (ref 1–4.8)
LYMPHOCYTES # BLD AUTO: 6.4 % — LOW (ref 20–55)
MCHC RBC-ENTMCNC: 30.4 PG — SIGNIFICANT CHANGE UP (ref 27–31)
MCHC RBC-ENTMCNC: 32.4 G/DL — SIGNIFICANT CHANGE UP (ref 32–36)
MCV RBC AUTO: 93.9 FL — SIGNIFICANT CHANGE UP (ref 81–99)
MONOCYTES # BLD AUTO: 1 K/UL — HIGH (ref 0–0.8)
MONOCYTES NFR BLD AUTO: 9.1 % — SIGNIFICANT CHANGE UP (ref 3–10)
NEUTROPHILS # BLD AUTO: 9.3 K/UL — HIGH (ref 1.8–8)
NEUTROPHILS NFR BLD AUTO: 81.8 % — HIGH (ref 37–73)
PLATELET # BLD AUTO: 157 K/UL — SIGNIFICANT CHANGE UP (ref 150–400)
POTASSIUM SERPL-MCNC: 3.8 MMOL/L — SIGNIFICANT CHANGE UP (ref 3.5–5.3)
POTASSIUM SERPL-SCNC: 3.8 MMOL/L — SIGNIFICANT CHANGE UP (ref 3.5–5.3)
RBC # BLD: 4.24 M/UL — LOW (ref 4.4–5.2)
RBC # FLD: 13.2 % — SIGNIFICANT CHANGE UP (ref 11–15.6)
SODIUM SERPL-SCNC: 141 MMOL/L — SIGNIFICANT CHANGE UP (ref 135–145)
WBC # BLD: 11.3 K/UL — HIGH (ref 4.8–10.8)
WBC # FLD AUTO: 11.3 K/UL — HIGH (ref 4.8–10.8)

## 2019-06-07 PROCEDURE — 72170 X-RAY EXAM OF PELVIS: CPT | Mod: 26

## 2019-06-07 PROCEDURE — 27095 INJECTION FOR HIP X-RAY: CPT | Mod: RT

## 2019-06-07 PROCEDURE — 27245 TREAT THIGH FRACTURE: CPT | Mod: RT

## 2019-06-07 PROCEDURE — 99232 SBSQ HOSP IP/OBS MODERATE 35: CPT

## 2019-06-07 RX ORDER — DEXTROSE 50 % IN WATER 50 %
25 SYRINGE (ML) INTRAVENOUS ONCE
Refills: 0 | Status: DISCONTINUED | OUTPATIENT
Start: 2019-06-07 | End: 2019-06-07

## 2019-06-07 RX ORDER — FERROUS SULFATE 325(65) MG
325 TABLET ORAL
Refills: 0 | Status: DISCONTINUED | OUTPATIENT
Start: 2019-06-07 | End: 2019-06-11

## 2019-06-07 RX ORDER — CEFAZOLIN SODIUM 1 G
2000 VIAL (EA) INJECTION ONCE
Refills: 0 | Status: DISCONTINUED | OUTPATIENT
Start: 2019-06-07 | End: 2019-06-07

## 2019-06-07 RX ORDER — MAGNESIUM HYDROXIDE 400 MG/1
30 TABLET, CHEWABLE ORAL DAILY
Refills: 0 | Status: DISCONTINUED | OUTPATIENT
Start: 2019-06-07 | End: 2019-06-11

## 2019-06-07 RX ORDER — ENOXAPARIN SODIUM 100 MG/ML
40 INJECTION SUBCUTANEOUS EVERY 24 HOURS
Refills: 0 | Status: DISCONTINUED | OUTPATIENT
Start: 2019-06-07 | End: 2019-06-11

## 2019-06-07 RX ORDER — CEFAZOLIN SODIUM 1 G
2000 VIAL (EA) INJECTION EVERY 8 HOURS
Refills: 0 | Status: COMPLETED | OUTPATIENT
Start: 2019-06-07 | End: 2019-06-08

## 2019-06-07 RX ORDER — DOCUSATE SODIUM 100 MG
100 CAPSULE ORAL THREE TIMES A DAY
Refills: 0 | Status: DISCONTINUED | OUTPATIENT
Start: 2019-06-07 | End: 2019-06-11

## 2019-06-07 RX ORDER — ONDANSETRON 8 MG/1
4 TABLET, FILM COATED ORAL ONCE
Refills: 0 | Status: DISCONTINUED | OUTPATIENT
Start: 2019-06-07 | End: 2019-06-07

## 2019-06-07 RX ORDER — MORPHINE SULFATE 50 MG/1
0.5 CAPSULE, EXTENDED RELEASE ORAL
Refills: 0 | Status: DISCONTINUED | OUTPATIENT
Start: 2019-06-07 | End: 2019-06-07

## 2019-06-07 RX ORDER — ASCORBIC ACID 60 MG
500 TABLET,CHEWABLE ORAL
Refills: 0 | Status: DISCONTINUED | OUTPATIENT
Start: 2019-06-07 | End: 2019-06-11

## 2019-06-07 RX ORDER — OXYCODONE HYDROCHLORIDE 5 MG/1
10 TABLET ORAL EVERY 4 HOURS
Refills: 0 | Status: DISCONTINUED | OUTPATIENT
Start: 2019-06-07 | End: 2019-06-11

## 2019-06-07 RX ORDER — DIPHENHYDRAMINE HCL 50 MG
25 CAPSULE ORAL AT BEDTIME
Refills: 0 | Status: DISCONTINUED | OUTPATIENT
Start: 2019-06-07 | End: 2019-06-11

## 2019-06-07 RX ORDER — DEXTROSE 50 % IN WATER 50 %
15 SYRINGE (ML) INTRAVENOUS ONCE
Refills: 0 | Status: DISCONTINUED | OUTPATIENT
Start: 2019-06-07 | End: 2019-06-07

## 2019-06-07 RX ORDER — SODIUM CHLORIDE 9 MG/ML
1000 INJECTION INTRAMUSCULAR; INTRAVENOUS; SUBCUTANEOUS
Refills: 0 | Status: DISCONTINUED | OUTPATIENT
Start: 2019-06-07 | End: 2019-06-11

## 2019-06-07 RX ORDER — SODIUM CHLORIDE 9 MG/ML
1000 INJECTION, SOLUTION INTRAVENOUS
Refills: 0 | Status: DISCONTINUED | OUTPATIENT
Start: 2019-06-07 | End: 2019-06-07

## 2019-06-07 RX ORDER — DEXTROSE 50 % IN WATER 50 %
12.5 SYRINGE (ML) INTRAVENOUS ONCE
Refills: 0 | Status: DISCONTINUED | OUTPATIENT
Start: 2019-06-07 | End: 2019-06-07

## 2019-06-07 RX ORDER — HYDROMORPHONE HYDROCHLORIDE 2 MG/ML
0.5 INJECTION INTRAMUSCULAR; INTRAVENOUS; SUBCUTANEOUS EVERY 6 HOURS
Refills: 0 | Status: DISCONTINUED | OUTPATIENT
Start: 2019-06-07 | End: 2019-06-11

## 2019-06-07 RX ORDER — ONDANSETRON 8 MG/1
4 TABLET, FILM COATED ORAL EVERY 6 HOURS
Refills: 0 | Status: DISCONTINUED | OUTPATIENT
Start: 2019-06-07 | End: 2019-06-11

## 2019-06-07 RX ORDER — FOLIC ACID 0.8 MG
1 TABLET ORAL DAILY
Refills: 0 | Status: DISCONTINUED | OUTPATIENT
Start: 2019-06-07 | End: 2019-06-11

## 2019-06-07 RX ORDER — OXYCODONE HYDROCHLORIDE 5 MG/1
5 TABLET ORAL EVERY 4 HOURS
Refills: 0 | Status: DISCONTINUED | OUTPATIENT
Start: 2019-06-07 | End: 2019-06-10

## 2019-06-07 RX ORDER — GLUCAGON INJECTION, SOLUTION 0.5 MG/.1ML
1 INJECTION, SOLUTION SUBCUTANEOUS ONCE
Refills: 0 | Status: DISCONTINUED | OUTPATIENT
Start: 2019-06-07 | End: 2019-06-07

## 2019-06-07 RX ORDER — HYDROMORPHONE HYDROCHLORIDE 2 MG/ML
0.5 INJECTION INTRAMUSCULAR; INTRAVENOUS; SUBCUTANEOUS
Refills: 0 | Status: DISCONTINUED | OUTPATIENT
Start: 2019-06-07 | End: 2019-06-07

## 2019-06-07 RX ADMIN — MORPHINE SULFATE 0.5 MILLIGRAM(S): 50 CAPSULE, EXTENDED RELEASE ORAL at 14:07

## 2019-06-07 RX ADMIN — SODIUM CHLORIDE 75 MILLILITER(S): 9 INJECTION, SOLUTION INTRAVENOUS at 05:04

## 2019-06-07 RX ADMIN — MORPHINE SULFATE 0.5 MILLIGRAM(S): 50 CAPSULE, EXTENDED RELEASE ORAL at 14:23

## 2019-06-07 RX ADMIN — SODIUM CHLORIDE 75 MILLILITER(S): 9 INJECTION, SOLUTION INTRAVENOUS at 12:01

## 2019-06-07 RX ADMIN — OXYCODONE HYDROCHLORIDE 10 MILLIGRAM(S): 5 TABLET ORAL at 01:37

## 2019-06-07 RX ADMIN — Medication 100 MILLIGRAM(S): at 21:12

## 2019-06-07 RX ADMIN — OXYCODONE HYDROCHLORIDE 10 MILLIGRAM(S): 5 TABLET ORAL at 02:37

## 2019-06-07 RX ADMIN — MORPHINE SULFATE 1 MILLIGRAM(S): 50 CAPSULE, EXTENDED RELEASE ORAL at 07:59

## 2019-06-07 NOTE — CONSULT NOTE ADULT - CONSULT REQUESTED DATE/TIME
PCP: Hamida Bashir. Lavinia Kehr, MD    Last appt: 7/6/2018  Future Appointments  Date Time Provider Vincenzo Licea   10/4/2018 11:20 AM Rosalie Harley MD [facility]       Requested Prescriptions     Pending Prescriptions Disp Refills    atorvastatin (LIPITOR) 10 mg tablet [Pharmacy Med Name: ATORVASTATIN 10 MG TABLET] 30 Tab 1     Sig: take 1 tablet by mouth NIGHTLY.        Prior labs and Blood pressures:  BP Readings from Last 3 Encounters:   07/06/18 102/52   05/15/18 126/80   05/08/18 124/70     Lab Results   Component Value Date/Time    Sodium 144 10/01/2015 12:18 PM    Potassium 5.0 10/01/2015 12:18 PM    Chloride 103 10/01/2015 12:18 PM    CO2 25 10/01/2015 12:18 PM    Glucose 79 10/01/2015 12:18 PM    BUN 38 (H) 10/01/2015 12:18 PM    Creatinine 3.94 (H) 10/01/2015 12:18 PM    BUN/Creatinine ratio 10 10/01/2015 12:18 PM    GFR est AA 14 (L) 10/01/2015 12:18 PM    GFR est non-AA 12 (L) 10/01/2015 12:18 PM    Calcium 8.8 10/01/2015 12:18 PM     No results found for: HBA1C, HGBE8, KHB5RGDX, BGY1BNKZ  Lab Results   Component Value Date/Time    Cholesterol, total 195 10/01/2015 12:18 PM    HDL Cholesterol 69 10/01/2015 12:18 PM    LDL, calculated 111 (H) 10/01/2015 12:18 PM    VLDL, calculated 15 10/01/2015 12:18 PM    Triglyceride 73 10/01/2015 12:18 PM     Lab Results   Component Value Date/Time    VITAMIN D, 25-HYDROXY 18.7 (L) 10/01/2015 12:18 PM       Lab Results   Component Value Date/Time    TSH 2.390 10/01/2015 12:18 PM
06-Jun-2019 17:01
07-Jun-2019 10:09

## 2019-06-07 NOTE — CONSULT NOTE ADULT - SUBJECTIVE AND OBJECTIVE BOX
[facility]                                   [address] [place] [address]                                                      PHONE: [phone]                                                         FAX: [phone]  [email or web address]  ---------------------------------------------------------------------------------------------------------------------------------    Reason for Consult: pre-operative cardiovascular risk assessment    HPI:  GARIMA ADAMS is an 84y Female with paroxysmal afib on ASA, Parkinsons disease, mitral valve repair presented yesterday evening with right hip pain after mechanical fall. She says she was walking down the stairs and tripped and fell and landed on her right side. She denies LOC, or any other cardiovascular symptoms prior to or after the fall. She has been feeling well of late.     PAST MEDICAL & SURGICAL HISTORY:  Chronic GERD  Stress incontinence  PAF (paroxysmal atrial fibrillation)  Parkinson disease  S/P small bowel resection  S/P tonsillectomy  H/O mitral valve repair  S/P hysterectomy      amiodarone (Hives)  strawberry (Hives; Urticaria)      MEDICATIONS  (STANDING):  carbidopa/levodopa  25/100 1 Tablet(s) Oral <User Schedule>  dextrose 5% + sodium chloride 0.9%. 1000 milliLiter(s) (75 mL/Hr) IV Continuous <Continuous>  dextrose 5%. 1000 milliLiter(s) (50 mL/Hr) IV Continuous <Continuous>  dextrose 50% Injectable 12.5 Gram(s) IV Push once  dextrose 50% Injectable 25 Gram(s) IV Push once  dextrose 50% Injectable 25 Gram(s) IV Push once  metoprolol succinate ER 25 milliGRAM(s) Oral daily    MEDICATIONS  (PRN):  acetaminophen   Tablet .. 650 milliGRAM(s) Oral every 6 hours PRN Temp greater or equal to 38C (100.4F), Mild Pain (1 - 3)  dextrose 40% Gel 15 Gram(s) Oral once PRN Blood Glucose LESS THAN 70 milliGRAM(s)/deciLiter  glucagon  Injectable 1 milliGRAM(s) IntraMuscular once PRN Glucose <70 milliGRAM(s)/deciLiter  oxyCODONE    IR 10 milliGRAM(s) Oral every 6 hours PRN Severe Pain (7 - 10)  oxyCODONE    IR 5 milliGRAM(s) Oral every 6 hours PRN Moderate Pain (4 - 6)      Social History:  Cigarettes:                    Alchohol:                 Illicit Drug Abuse:      ROS: Negative other than as mentioned in HPI.    Vital Signs Last 24 Hrs  T(C): 36.6 (07 Jun 2019 07:14), Max: 36.8 (06 Jun 2019 20:08)  T(F): 97.9 (07 Jun 2019 07:14), Max: 98.2 (06 Jun 2019 20:08)  HR: 71 (07 Jun 2019 07:14) (71 - 79)  BP: 97/60 (07 Jun 2019 07:14) (90/51 - 133/62)  BP(mean): --  RR: 20 (07 Jun 2019 07:14) (20 - 20)  SpO2: 96% (07 Jun 2019 07:14) (86% - 96%)  ICU Vital Signs Last 24 Hrs  GARIMA ADAMS  I&O's Detail    I&O's Summary    Drug Dosing Weight  GARIMA ADAMS      PHYSICAL EXAM:  General: Appears well developed, well nourished alert and cooperative.  HEENT: Head; normocephalic, atraumatic.  Eyes: Pupils reactive, cornea wnl.  Neck: Supple, no nodes adenopathy, no NVD or carotid bruit or thyromegaly.  CARDIOVASCULAR: Normal S1 and S2, No murmur, rub, gallop or lift.   LUNGS: No rales, rhonchi or wheeze. Normal breath sounds bilaterally.  ABDOMEN: Soft, nontender without mass or organomegaly. bowel sounds normoactive.  EXTREMITIES: No clubbing, cyanosis or edema. Distal pulses wnl.   SKIN: warm and dry with normal turgor.  NEURO: Alert/oriented x 3/normal motor exam. No pathologic reflexes.    PSYCH: normal affect.        LABS:                        14.3   8.0   )-----------( 200      ( 06 Jun 2019 15:50 )             42.9     06-06    140  |  103  |  22.0<H>  ----------------------------<  102  4.0   |  23.0  |  0.82    Ca    9.4      06 Jun 2019 15:50    TPro  6.9  /  Alb  4.0  /  TBili  1.0  /  DBili  x   /  AST  19  /  ALT  <5  /  AlkPhos  83  06-06    GARIMA ADAMS      PT/INR - ( 06 Jun 2019 15:50 )   PT: 12.3 sec;   INR: 1.07 ratio         PTT - ( 06 Jun 2019 15:50 )  PTT:28.4 sec      RADIOLOGY & ADDITIONAL STUDIES:    INTERPRETATION OF TELEMETRY (personally reviewed):    ECG: sinus rhythm 72 bpm, normal axis, 1st degree AV conduction delay, nonspecific ST abnormality    Assessment and Plan:  In summary, GARIMA ADAMS is an 84y Female with past medical history significant for paroxysmal afib on ASA, Parkinsons disease, mitral valve repair presented with right hip pain after mechanical fall and found with right femoral neck fracture. She is planned for surgical repair this morning.    Pre-Operative Cardiovascular Risk Assessment  - the patient is asymptomatic from a cardiovascular standpoint and there are no active severe cardiac conditions. Baseline function capacity is unclear, but she is likely able to achieve METS > 4 at baseline. Given her age and medical history, she is moderate risk for cardiovascular perioperative complications for a non-low risk surgery, and is currently medically optimized and can proceed without further cardiac workup.   - continue beta blocker in the perioperative period    Thank you for allowing [facility] to participate in the care of this patient. Please do not hesitate to call with any questions or concerns.

## 2019-06-07 NOTE — BRIEF OPERATIVE NOTE - NSICDXBRIEFPROCEDURE_GEN_ALL_CORE_FT
PROCEDURES:  Hip arthrogram 07-Jun-2019 16:39:42  Orlando Ram  Antegrade intertrochanteric nailing of femur 07-Jun-2019 16:38:58  Orlando Ram

## 2019-06-07 NOTE — PROGRESS NOTE ADULT - SUBJECTIVE AND OBJECTIVE BOX
cc: fall , hip fx     interval hx : pt seen and eval. comfortable. daughter at bedside. pain adequately controlled. no fever or chills. npo for sx today, deneis cp, sob, cough, n/v/chills     Vital Signs Last 24 Hrs  T(C): 37.2 (07 Jun 2019 12:05), Max: 37.2 (07 Jun 2019 12:05)  T(F): 99 (07 Jun 2019 12:05), Max: 99 (07 Jun 2019 12:05)  HR: 65 (07 Jun 2019 14:22) (65 - 79)  BP: 108/60 (07 Jun 2019 14:22) (90/51 - 133/62)  BP(mean): --  RR: 15 (07 Jun 2019 14:22) (15 - 20)  SpO2: 96% (07 Jun 2019 14:22) (86% - 96%)    Physical Exam:  · well-developed; well-groomed; well-nourished; no distress  · Eyes	PERRL; EOMI  · Neck Details	supple  · Respiratory Details	airway patent; good air movement; clear to auscultation bilaterally; no rales; no rhonchi; no wheezes  · Cardiovascular Details	no rub  no murmur  · Cardiovascular Details	positive S1; positive S2  · GI Normal	soft; nontender; no distention; bowel sounds normal  · Extremities Details	no clubbing; no cyanosis; no pedal edema  · Neurological Details	alert and oriented x 3; sensation intact; cranial nerves intact  · Musculoskeletal Details	decreased ROM due to pain  · Musculoskeletal Comments	RLE externally rotated and R groin and hip tenderness on palpation cc: fall , hip fx     interval hx : pt seen and eval. comfortable. daughter at bedside. pain adequately controlled. no fever or chills. npo for sx today, juliana cp, sob, cough, n/v/chills     Vital Signs Last 24 Hrs  T(C): 37.2 (07 Jun 2019 12:05), Max: 37.2 (07 Jun 2019 12:05)  T(F): 99 (07 Jun 2019 12:05), Max: 99 (07 Jun 2019 12:05)  HR: 65 (07 Jun 2019 14:22) (65 - 79)  BP: 108/60 (07 Jun 2019 14:22) (90/51 - 133/62)  BP(mean): --  RR: 15 (07 Jun 2019 14:22) (15 - 20)  SpO2: 96% (07 Jun 2019 14:22) (86% - 96%)    Physical Exam:  · well-developed; well-groomed; well-nourished; no distress  · Eyes	PERRL; EOMI  · Neck Details	supple  · Respiratory Details	airway patent; good air movement; clear to auscultation bilaterally; no rales; no rhonchi; no wheezes  · Cardiovascular Details	no rub  no murmur  · Cardiovascular Details	positive S1; positive S2  · GI Normal	soft; nontender; no distention; bowel sounds normal  · Extremities Details	no clubbing; no cyanosis; no pedal edema  · Neurological Details	alert and oriented x 3; sensation intact; cranial nerves intact  · Musculoskeletal Details	decreased ROM due to pain  · Musculoskeletal Comments	RLE externally rotated and R groin and hip tenderness on palpation                              14.3   8.0   )-----------( 200      ( 06 Jun 2019 15:50 )             42.9   06-06    140  |  103  |  22.0<H>  ----------------------------<  102  4.0   |  23.0  |  0.82    Ca    9.4      06 Jun 2019 15:50    TPro  6.9  /  Alb  4.0  /  TBili  1.0  /  DBili  x   /  AST  19  /  ALT  <5  /  AlkPhos  83  06-06

## 2019-06-08 ENCOUNTER — TRANSCRIPTION ENCOUNTER (OUTPATIENT)
Age: 84
End: 2019-06-08

## 2019-06-08 LAB
ANION GAP SERPL CALC-SCNC: 10 MMOL/L — SIGNIFICANT CHANGE UP (ref 5–17)
BUN SERPL-MCNC: 12 MG/DL — SIGNIFICANT CHANGE UP (ref 8–20)
CALCIUM SERPL-MCNC: 8.3 MG/DL — LOW (ref 8.6–10.2)
CHLORIDE SERPL-SCNC: 109 MMOL/L — HIGH (ref 98–107)
CO2 SERPL-SCNC: 24 MMOL/L — SIGNIFICANT CHANGE UP (ref 22–29)
CREAT SERPL-MCNC: 0.66 MG/DL — SIGNIFICANT CHANGE UP (ref 0.5–1.3)
GLUCOSE SERPL-MCNC: 125 MG/DL — HIGH (ref 70–115)
HCT VFR BLD CALC: 38.3 % — SIGNIFICANT CHANGE UP (ref 37–47)
HGB BLD-MCNC: 12.6 G/DL — SIGNIFICANT CHANGE UP (ref 12–16)
MCHC RBC-ENTMCNC: 31.3 PG — HIGH (ref 27–31)
MCHC RBC-ENTMCNC: 32.9 G/DL — SIGNIFICANT CHANGE UP (ref 32–36)
MCV RBC AUTO: 95 FL — SIGNIFICANT CHANGE UP (ref 81–99)
PLATELET # BLD AUTO: 168 K/UL — SIGNIFICANT CHANGE UP (ref 150–400)
POTASSIUM SERPL-MCNC: 4.3 MMOL/L — SIGNIFICANT CHANGE UP (ref 3.5–5.3)
POTASSIUM SERPL-SCNC: 4.3 MMOL/L — SIGNIFICANT CHANGE UP (ref 3.5–5.3)
RBC # BLD: 4.03 M/UL — LOW (ref 4.4–5.2)
RBC # FLD: 13.3 % — SIGNIFICANT CHANGE UP (ref 11–15.6)
SODIUM SERPL-SCNC: 143 MMOL/L — SIGNIFICANT CHANGE UP (ref 135–145)
WBC # BLD: 12.6 K/UL — HIGH (ref 4.8–10.8)
WBC # FLD AUTO: 12.6 K/UL — HIGH (ref 4.8–10.8)

## 2019-06-08 PROCEDURE — 76377 3D RENDER W/INTRP POSTPROCES: CPT | Mod: 26

## 2019-06-08 PROCEDURE — 99232 SBSQ HOSP IP/OBS MODERATE 35: CPT

## 2019-06-08 PROCEDURE — 72192 CT PELVIS W/O DYE: CPT | Mod: 26

## 2019-06-08 RX ORDER — ESCITALOPRAM OXALATE 10 MG/1
5 TABLET, FILM COATED ORAL DAILY
Refills: 0 | Status: DISCONTINUED | OUTPATIENT
Start: 2019-06-08 | End: 2019-06-11

## 2019-06-08 RX ORDER — CARBIDOPA AND LEVODOPA 25; 100 MG/1; MG/1
1 TABLET ORAL
Refills: 0 | Status: DISCONTINUED | OUTPATIENT
Start: 2019-06-08 | End: 2019-06-11

## 2019-06-08 RX ORDER — METOPROLOL TARTRATE 50 MG
25 TABLET ORAL DAILY
Refills: 0 | Status: DISCONTINUED | OUTPATIENT
Start: 2019-06-08 | End: 2019-06-11

## 2019-06-08 RX ORDER — ACETAMINOPHEN 500 MG
650 TABLET ORAL EVERY 6 HOURS
Refills: 0 | Status: DISCONTINUED | OUTPATIENT
Start: 2019-06-08 | End: 2019-06-11

## 2019-06-08 RX ADMIN — Medication 325 MILLIGRAM(S): at 16:02

## 2019-06-08 RX ADMIN — Medication 650 MILLIGRAM(S): at 07:28

## 2019-06-08 RX ADMIN — Medication 500 MILLIGRAM(S): at 21:35

## 2019-06-08 RX ADMIN — Medication 325 MILLIGRAM(S): at 10:03

## 2019-06-08 RX ADMIN — OXYCODONE HYDROCHLORIDE 10 MILLIGRAM(S): 5 TABLET ORAL at 16:39

## 2019-06-08 RX ADMIN — Medication 650 MILLIGRAM(S): at 06:28

## 2019-06-08 RX ADMIN — SODIUM CHLORIDE 75 MILLILITER(S): 9 INJECTION INTRAMUSCULAR; INTRAVENOUS; SUBCUTANEOUS at 06:00

## 2019-06-08 RX ADMIN — Medication 500 MILLIGRAM(S): at 05:52

## 2019-06-08 RX ADMIN — CARBIDOPA AND LEVODOPA 1 TABLET(S): 25; 100 TABLET ORAL at 16:02

## 2019-06-08 RX ADMIN — Medication 25 MILLIGRAM(S): at 11:51

## 2019-06-08 RX ADMIN — OXYCODONE HYDROCHLORIDE 10 MILLIGRAM(S): 5 TABLET ORAL at 21:36

## 2019-06-08 RX ADMIN — ENOXAPARIN SODIUM 40 MILLIGRAM(S): 100 INJECTION SUBCUTANEOUS at 05:52

## 2019-06-08 RX ADMIN — OXYCODONE HYDROCHLORIDE 5 MILLIGRAM(S): 5 TABLET ORAL at 11:54

## 2019-06-08 RX ADMIN — CARBIDOPA AND LEVODOPA 1 TABLET(S): 25; 100 TABLET ORAL at 11:51

## 2019-06-08 RX ADMIN — Medication 325 MILLIGRAM(S): at 11:51

## 2019-06-08 RX ADMIN — Medication 1 TABLET(S): at 11:51

## 2019-06-08 RX ADMIN — OXYCODONE HYDROCHLORIDE 10 MILLIGRAM(S): 5 TABLET ORAL at 16:03

## 2019-06-08 RX ADMIN — OXYCODONE HYDROCHLORIDE 10 MILLIGRAM(S): 5 TABLET ORAL at 22:30

## 2019-06-08 RX ADMIN — Medication 100 MILLIGRAM(S): at 06:04

## 2019-06-08 RX ADMIN — Medication 1 MILLIGRAM(S): at 11:51

## 2019-06-08 NOTE — PROGRESS NOTE ADULT - SUBJECTIVE AND OBJECTIVE BOX
ORTHO-POST-OP PROGRESS NOTE:      [id number]    GARIMA ADAMS      PROCEDURE: Left hip IMN   Surgeon: Dr. Ram  DOS: 6/7/19           Patient seen and examined. Patient doing ell. Pain well controlled with prescribed medications. Tolerating PO fluids and diet well. Patient is voiding. Patient has not ambulated yet postoperatively. Patient denies acute sensory or motor changes.                           12.9   11.3  )-----------( 157      ( 07 Jun 2019 17:21 )             39.8     I&O's Detail    07 Jun 2019 07:01  -  08 Jun 2019 03:02  --------------------------------------------------------  IN:    sodium chloride 0.9%.: 525 mL  Total IN: 525 mL    OUT:    Voided: 600 mL  Total OUT: 600 mL    Total NET: -75 mL      aluminum hydroxide/magnesium hydroxide/simethicone Suspension 30 milliLiter(s) Oral four times a day PRN  ascorbic acid 500 milliGRAM(s) Oral two times a day  ceFAZolin   IVPB 2000 milliGRAM(s) IV Intermittent every 8 hours  diphenhydrAMINE 25 milliGRAM(s) Oral at bedtime PRN  docusate sodium 100 milliGRAM(s) Oral three times a day  enoxaparin Injectable 40 milliGRAM(s) SubCutaneous every 24 hours  ferrous    sulfate 325 milliGRAM(s) Oral three times a day with meals  folic acid 1 milliGRAM(s) Oral daily  HYDROmorphone  Injectable 0.5 milliGRAM(s) IV Push every 6 hours PRN  magnesium hydroxide Suspension 30 milliLiter(s) Oral daily PRN  multivitamin 1 Tablet(s) Oral daily  ondansetron Injectable 4 milliGRAM(s) IV Push every 6 hours PRN  oxyCODONE    IR 10 milliGRAM(s) Oral every 4 hours PRN  oxyCODONE    IR 5 milliGRAM(s) Oral every 4 hours PRN  sodium chloride 0.9%. 1000 milliLiter(s) IV Continuous <Continuous>    T(C): 36.8 (06-07-19 @ 23:33), Max: 37.2 (06-07-19 @ 12:05)  HR: 89 (06-07-19 @ 23:33) (65 - 89)  BP: 101/59 (06-07-19 @ 23:33) (97/60 - 128/56)  RR: 20 (06-07-19 @ 23:33) (11 - 20)  SpO2: 93% (06-07-19 @ 23:33) (93% - 96%)  Wt(kg): --      PHYSICAL EXAM:     Constitutional: Alert, responsive, in no acute distress.     Right lower Extremity:   Dressing: Clean/dry/intact. No active bleeding or discharge noted. SILT L2-S2. Calf soft nontender. +plantarflexion/dorsiflexion/FHL/EHL. Dorsalis pedis pulse 2+. BCR.                                                                A/P :  85 y/o Female S/P Right Hip IMN  POD# 1    -  Pain control  -  DVT ppx: Lovenox 40QD  -  PT and out of bed today  -  Weight bearing status: WBAT Right lower extremity with assistance of a rolling walker  -  F/U Right Hip CT  -  Discharge planning as per primary team

## 2019-06-08 NOTE — PROGRESS NOTE ADULT - SUBJECTIVE AND OBJECTIVE BOX
GARIMA ADAMS    [id number]    Patient seen and examined status post right hip IM nail, POD # 1.  The patient's pain is controlled using the prescribed pain medications. The patient has been OOB and ambulated to the bathroom.  Denies nausea, vomiting, chest pain, shortness of breath, abdominal pain, LE N/T. No new complaints.    Vital Signs Last 24 Hrs  T(C): 36.4 (08 Jun 2019 07:30), Max: 37.2 (07 Jun 2019 12:05)  T(F): 97.5 (08 Jun 2019 07:30), Max: 99 (07 Jun 2019 12:05)  HR: 89 (08 Jun 2019 07:30) (65 - 89)  BP: 108/72 (08 Jun 2019 07:30) (91/56 - 128/56)  BP(mean): --  RR: 18 (08 Jun 2019 07:30) (11 - 20)  SpO2: 92% (08 Jun 2019 07:30) (92% - 96%)                            12.6   12.6  )-----------( 168      ( 08 Jun 2019 06:22 )             38.3     06-08    143  |  109<H>  |  12.0  ----------------------------<  125<H>  4.3   |  24.0  |  0.66    Ca    8.3<L>      08 Jun 2019 06:22    TPro  6.9  /  Alb  4.0  /  TBili  1.0  /  DBili  x   /  AST  19  /  ALT  <5  /  AlkPhos  83  06-06      MEDICATIONS  (STANDING):  ascorbic acid 500 milliGRAM(s) Oral two times a day  docusate sodium 100 milliGRAM(s) Oral three times a day  enoxaparin Injectable 40 milliGRAM(s) SubCutaneous every 24 hours  ferrous    sulfate 325 milliGRAM(s) Oral three times a day with meals  folic acid 1 milliGRAM(s) Oral daily  multivitamin 1 Tablet(s) Oral daily  sodium chloride 0.9%. 1000 milliLiter(s) (75 mL/Hr) IV Continuous <Continuous>    MEDICATIONS  (PRN):  acetaminophen   Tablet .. 650 milliGRAM(s) Oral every 6 hours PRN Temp greater or equal to 38C (100.4F), Mild Pain (1 - 3)  aluminum hydroxide/magnesium hydroxide/simethicone Suspension 30 milliLiter(s) Oral four times a day PRN Indigestion  diphenhydrAMINE 25 milliGRAM(s) Oral at bedtime PRN Insomnia  HYDROmorphone  Injectable 0.5 milliGRAM(s) IV Push every 6 hours PRN Severe Pain (7 - 10)  magnesium hydroxide Suspension 30 milliLiter(s) Oral daily PRN Constipation  ondansetron Injectable 4 milliGRAM(s) IV Push every 6 hours PRN Nausea and/or Vomiting  oxyCODONE    IR 10 milliGRAM(s) Oral every 4 hours PRN Moderate Pain (4 - 6)  oxyCODONE    IR 5 milliGRAM(s) Oral every 4 hours PRN Mild Pain (1 - 3)      Physical exam: The right hip dressing is clean, dry and intact. No drainage or discharge. No erythema is noted. No blistering. No ecchymosis. The calf is supple nontender. Passive range of motion is acceptable to due postoperative pain. No calf tenderness. Sensation to light touch is grossly intact distally. Motor function distally is 5/5. No foot drop. 2+ dorsalis pedis pulse. Capillary refill is less than 2 seconds. No cyanosis.    Primary Orthopedic Assessment:  • s/p RIGHT hip IM Nail, POD # 1    Secondary  Orthopedic Assessment(s):   •     Secondary  Medical Assessment(s):   •     Plan:   • DVT prophylaxis: Lovenox 40 qd, use of compression devices and ankle pumps  • Continue physical therapy  • Weightbearing as tolerated of the right lower extremity with assistance of a walker  • Incentive spirometry encouraged  • Pain control as clinically indicated  • F/U right Hip CT  ortho to follow  • continue care with primary team

## 2019-06-08 NOTE — PHYSICAL THERAPY INITIAL EVALUATION ADULT - RANGE OF MOTION EXAMINATION, REHAB EVAL
bilateral upper extremity ROM was WFL (within functional limits)/bilateral lower extremity ROM was WFL (within functional limits)/except right hip flexion 0-90

## 2019-06-08 NOTE — DISCHARGE NOTE PROVIDER - CARE PROVIDER_API CALL
Orlando Ram)  Orthopaedic Surgery  [address] [place] [address]  Phone: [phone]  Fax: [phone]  Follow Up Time: Orlando Ram)  Orthopaedic Surgery  [address] [place] [address]  Phone: [phone]  Fax: [phone]  Follow Up Time: 1 week    primary care,   Phone: (   )    -  Fax: (   )    -  Follow Up Time: 1 week

## 2019-06-08 NOTE — PHYSICAL THERAPY INITIAL EVALUATION ADULT - PASSIVE RANGE OF MOTION EXAMINATION, REHAB EVAL
bilateral upper extremity Passive ROM was WFL (within functional limits)/bilateral lower extremity Passive ROM was WFL (within functional limits)/except right hip flexion 0-90

## 2019-06-08 NOTE — PROGRESS NOTE ADULT - SUBJECTIVE AND OBJECTIVE BOX
cc: fall , hip fx     interval hx : pt seen and eval. comfortable. . pain adequately controlled. no fever or chills. npo for sx today, denlindseys cp, sob, cough, n/v/chills . s/p hip sx , pod 1     Vital Signs Last 24 Hrs  T(C): 36.4 (08 Jun 2019 07:30), Max: 36.8 (07 Jun 2019 17:00)  T(F): 97.5 (08 Jun 2019 07:30), Max: 98.2 (07 Jun 2019 17:00)  HR: 89 (08 Jun 2019 07:30) (65 - 89)  BP: 108/72 (08 Jun 2019 07:30) (91/56 - 128/56)  BP(mean): --  RR: 18 (08 Jun 2019 07:30) (11 - 20)  SpO2: 92% (08 Jun 2019 07:30) (92% - 96%)    Physical Exam:  · well-developed; well-groomed; well-nourished; no distress  · Eyes	PERRL; EOMI  · Neck Details	supple  · Respiratory Details	airway patent; good air movement; clear to auscultation bilaterally; no rales; no rhonchi; no wheezes  · Cardiovascular Details	no rub  no murmur  · Cardiovascular Details	positive S1; positive S2  · GI Normal	soft; nontender; no distention; bowel sounds normal  · Extremities Details	no clubbing; no cyanosis; no pedal edema  · Neurological Details	alert and oriented x 3; sensation intact; cranial nerves intact  · Musculoskeletal Details	decreased ROM due to pain  · Musculoskeletal Comments	RLE externally post op dressing in place. no edema or bruising noted                             12.6   12.6  )-----------( 168      ( 08 Jun 2019 06:22 )             38.3   06-08    143  |  109<H>  |  12.0  ----------------------------<  125<H>  4.3   |  24.0  |  0.66    Ca    8.3<L>      08 Jun 2019 06:22    TPro  6.9  /  Alb  4.0  /  TBili  1.0  /  DBili  x   /  AST  19  /  ALT  <5  /  AlkPhos  83  06-06

## 2019-06-08 NOTE — PHYSICAL THERAPY INITIAL EVALUATION ADULT - CRITERIA FOR SKILLED THERAPEUTIC INTERVENTIONS
anticipated discharge recommendation/therapy frequency/rehab potential/predicted duration of therapy intervention/risk reduction/prevention/impairments found/anticipated equipment needs at discharge/functional limitations in following categories

## 2019-06-08 NOTE — DISCHARGE NOTE PROVIDER - NSDCFUADDINST_GEN_ALL_CORE_FT
Orthopedic Discharge instructions:     The patient will be seen in the office on 6/27/19 for wound check. PLEASE CALL OFFICE TO MAKE APPOINTMENT. Patient may shower after post-op day #5 (6/12/19) . The dressing is to be removed on post-op day #7 (6/14/19) . IF THE DRESSING BECOMES SOILED BEFORE THE REMOVAL DATE, CHANGE WITH A SIMILAR DRESSING. IF THE DRESSING BECOMES STAINED WITH DISCHARGE, CONTACT THE OFFICE FOR FURTHER DIRECTIONS.  The patient will contact the office if the wound becomes red, has increasing pain, develops bleeding or discharge, an injury occurs, or has other concerns. The patient will continue PT for gait training. The patient is FULL weight bearing.

## 2019-06-08 NOTE — DISCHARGE NOTE PROVIDER - NSDCCPCAREPLAN_GEN_ALL_CORE_FT
PRINCIPAL DISCHARGE DIAGNOSIS  Diagnosis: Hip fracture  Assessment and Plan of Treatment: status post sx. c/w rehab. follow up with ortho in 2 weeks.      SECONDARY DISCHARGE DIAGNOSES  Diagnosis: Breast mass  Assessment and Plan of Treatment: follow up with primary care as previously  for further imaging and plan of care.    Diagnosis: Stress incontinence  Assessment and Plan of Treatment: follow up with gyn for further workup and plan    Diagnosis: Parkinson disease  Assessment and Plan of Treatment: continnue with hoem medications    Diagnosis: Paroxysmal A-fib  Assessment and Plan of Treatment: continue with home meds.

## 2019-06-08 NOTE — PHYSICAL THERAPY INITIAL EVALUATION ADULT - ACTIVE RANGE OF MOTION EXAMINATION, REHAB EVAL
bilateral upper extremity Active ROM was WFL (within functional limits)/except right hip flexion 0-20/bilateral  lower extremity Active ROM was WFL (within functional limits)

## 2019-06-08 NOTE — DISCHARGE NOTE PROVIDER - CARE PROVIDERS DIRECT ADDRESSES
[email or web address],DirectAddress_Unknown

## 2019-06-08 NOTE — DISCHARGE NOTE PROVIDER - PROVIDER TOKENS
PROVIDER:[TOKEN:[[id number]]] PROVIDER:[TOKEN:[[id number]],FOLLOWUP:[1 week]],FREE:[LAST:[primary care],PHONE:[(   )    -],FAX:[(   )    -],FOLLOWUP:[1 week]]

## 2019-06-08 NOTE — DISCHARGE NOTE PROVIDER - HOSPITAL COURSE
84 year old  Female with hx of PAF not on AC (on asa), breast mass, parkinsons dz, GERD and stress incontinence who presents s/p mechanical fall with R sided hip pain with noted R impacted femoral neck fracture.         >R femoral neck fracture/ s/p sx. R hip/ pain controlled. dc to [facility]         >Parkinson dz. c/w sinemet po 4x day    - pt follows with neurology as an outpt         >PAF    - Not on AC takes asa 162mg po 3x a week -held for procedure to be restarted thereafter    - c/w metoprolol er 25mg po qd with parameters    -cleared by cardio for sx         >Left breast mass    - Noted on pmd visit from [facility]    - pt to manage and c/w work up as an outpt with pmd        >Stress Incontinence/ stable     - pt has been having this worked up as an outpt with GYN    - c/w female catheter         >GERD    - c/w protonix 40mg po qd                Vital Signs Last 24 Hrs    T(C): 36.9 (11 Jun 2019 07:00), Max: 36.9 (10 Slava 2019 22:17)    T(F): 98.5 (11 Jun 2019 07:00), Max: 98.5 (11 Jun 2019 07:00)    HR: 94 (11 Jun 2019 07:00) (83 - 104)    BP: 100/63 (11 Jun 2019 07:00) (100/63 - 110/63)    BP(mean): --    RR: 18 (11 Jun 2019 07:00) (18 - 18)    SpO2: 92% (11 Jun 2019 07:00) (92% - 95%)        Physical Exam:    · well-developed; well-groomed; well-nourished; no distress    · Eyes	PERRL; EOMI    · Neck Details	supple    · Respiratory Details	airway patent; good air movement; clear to auscultation bilaterally; no rales; no rhonchi; no wheezes    · Cardiovascular Details	no rub  no murmur    · Cardiovascular Details	positive S1; positive S2    · GI Normal	soft; nontender; no distention; bowel sounds normal    · Extremities Details	no clubbing; no cyanosis; no pedal edema    · Neurological Details	alert and oriented x 3; sensation intact; cranial nerves intact    · Musculoskeletal Details	decreased ROM due to pain    · Musculoskeletal Comments	RLE externally post op dressing in place. no edema or bruising noted 84 year old  Female with hx of PAF not on AC (on asa), breast mass, parkinsons dz, GERD and stress incontinence who presents s/p mechanical fall with R sided hip pain with noted R impacted femoral neck fracture.         >R femoral neck fracture/ s/p sx. R hip/ pain controlled. dc to [facility]         >Parkinson dz. c/w sinemet po 4x day    - pt follows with neurology as an outpt         >PAF    - Not on AC. takes asa 162mg po 3x a week -held for procedure to be restarted thereafter    - c/w metoprolol er 25mg po qd with parameters    -cleared by cardio for sx         >Left breast mass    - Noted on [facility]md visit from [facility]    - pt to manage and c/w work up as an outpt with pmd        >Stress Incontinence/ stable     - pt has been having this worked up as an outpt with GYN    - c/w female catheter         >GERD    - c/w protonix 40mg po qd                Vital Signs Last 24 Hrs    T(C): 36.9 (11 Jun 2019 07:00), Max: 36.9 (10 Slava 2019 22:17)    T(F): 98.5 (11 Jun 2019 07:00), Max: 98.5 (11 Jun 2019 07:00)    HR: 94 (11 Jun 2019 07:00) (83 - 104)    BP: 100/63 (11 Jun 2019 07:00) (100/63 - 110/63)    BP(mean): --    RR: 18 (11 Jun 2019 07:00) (18 - 18)    SpO2: 92% (11 Jun 2019 07:00) (92% - 95%)        Physical Exam:    · well-developed; well-groomed; well-nourished; no distress    · Eyes	PERRL; EOMI    · Neck Details	supple    · Respiratory Details	airway patent; good air movement; clear to auscultation bilaterally; no rales; no rhonchi; no wheezes    · Cardiovascular Details	no rub  no murmur    · Cardiovascular Details	positive S1; positive S2    · GI Normal	soft; nontender; no distention; bowel sounds normal    · Extremities Details	no clubbing; no cyanosis; no pedal edema    · Neurological Details	alert and oriented x 3; sensation intact; cranial nerves intact    · Musculoskeletal Details	decreased ROM due to pain    · Musculoskeletal Comments	RLE externally post op dressing in place. no edema or bruising noted         time spent for this dc 46 mins

## 2019-06-08 NOTE — DISCHARGE NOTE PROVIDER - NSDCCPTREATMENT_GEN_ALL_CORE_FT
PRINCIPAL PROCEDURE  Procedure: Antegrade intertrochanteric nailing of femur  Findings and Treatment:

## 2019-06-09 LAB
ANION GAP SERPL CALC-SCNC: 10 MMOL/L — SIGNIFICANT CHANGE UP (ref 5–17)
BUN SERPL-MCNC: 21 MG/DL — HIGH (ref 8–20)
CALCIUM SERPL-MCNC: 8.3 MG/DL — LOW (ref 8.6–10.2)
CHLORIDE SERPL-SCNC: 105 MMOL/L — SIGNIFICANT CHANGE UP (ref 98–107)
CO2 SERPL-SCNC: 24 MMOL/L — SIGNIFICANT CHANGE UP (ref 22–29)
CREAT SERPL-MCNC: 0.6 MG/DL — SIGNIFICANT CHANGE UP (ref 0.5–1.3)
GLUCOSE SERPL-MCNC: 124 MG/DL — HIGH (ref 70–115)
HCT VFR BLD CALC: 34.7 % — LOW (ref 37–47)
HGB BLD-MCNC: 11.3 G/DL — LOW (ref 12–16)
MCHC RBC-ENTMCNC: 30.8 PG — SIGNIFICANT CHANGE UP (ref 27–31)
MCHC RBC-ENTMCNC: 32.6 G/DL — SIGNIFICANT CHANGE UP (ref 32–36)
MCV RBC AUTO: 94.6 FL — SIGNIFICANT CHANGE UP (ref 81–99)
PLATELET # BLD AUTO: 161 K/UL — SIGNIFICANT CHANGE UP (ref 150–400)
POTASSIUM SERPL-MCNC: 3.8 MMOL/L — SIGNIFICANT CHANGE UP (ref 3.5–5.3)
POTASSIUM SERPL-SCNC: 3.8 MMOL/L — SIGNIFICANT CHANGE UP (ref 3.5–5.3)
RBC # BLD: 3.67 M/UL — LOW (ref 4.4–5.2)
RBC # FLD: 13.3 % — SIGNIFICANT CHANGE UP (ref 11–15.6)
SODIUM SERPL-SCNC: 139 MMOL/L — SIGNIFICANT CHANGE UP (ref 135–145)
WBC # BLD: 10 K/UL — SIGNIFICANT CHANGE UP (ref 4.8–10.8)
WBC # FLD AUTO: 10 K/UL — SIGNIFICANT CHANGE UP (ref 4.8–10.8)

## 2019-06-09 PROCEDURE — 99232 SBSQ HOSP IP/OBS MODERATE 35: CPT

## 2019-06-09 RX ADMIN — Medication 100 MILLIGRAM(S): at 12:19

## 2019-06-09 RX ADMIN — Medication 100 MILLIGRAM(S): at 22:07

## 2019-06-09 RX ADMIN — Medication 325 MILLIGRAM(S): at 12:19

## 2019-06-09 RX ADMIN — Medication 100 MILLIGRAM(S): at 05:12

## 2019-06-09 RX ADMIN — Medication 500 MILLIGRAM(S): at 05:12

## 2019-06-09 RX ADMIN — ENOXAPARIN SODIUM 40 MILLIGRAM(S): 100 INJECTION SUBCUTANEOUS at 05:12

## 2019-06-09 RX ADMIN — Medication 1 MILLIGRAM(S): at 09:14

## 2019-06-09 RX ADMIN — CARBIDOPA AND LEVODOPA 1 TABLET(S): 25; 100 TABLET ORAL at 22:07

## 2019-06-09 RX ADMIN — Medication 650 MILLIGRAM(S): at 12:40

## 2019-06-09 RX ADMIN — OXYCODONE HYDROCHLORIDE 5 MILLIGRAM(S): 5 TABLET ORAL at 12:38

## 2019-06-09 RX ADMIN — CARBIDOPA AND LEVODOPA 1 TABLET(S): 25; 100 TABLET ORAL at 09:14

## 2019-06-09 RX ADMIN — CARBIDOPA AND LEVODOPA 1 TABLET(S): 25; 100 TABLET ORAL at 14:58

## 2019-06-09 RX ADMIN — Medication 325 MILLIGRAM(S): at 09:14

## 2019-06-09 RX ADMIN — Medication 1 TABLET(S): at 09:14

## 2019-06-09 RX ADMIN — Medication 500 MILLIGRAM(S): at 16:38

## 2019-06-09 RX ADMIN — CARBIDOPA AND LEVODOPA 1 TABLET(S): 25; 100 TABLET ORAL at 12:19

## 2019-06-09 RX ADMIN — Medication 650 MILLIGRAM(S): at 13:46

## 2019-06-09 RX ADMIN — Medication 325 MILLIGRAM(S): at 16:38

## 2019-06-09 RX ADMIN — Medication 25 MILLIGRAM(S): at 05:12

## 2019-06-09 RX ADMIN — ESCITALOPRAM OXALATE 5 MILLIGRAM(S): 10 TABLET, FILM COATED ORAL at 09:14

## 2019-06-09 NOTE — PROGRESS NOTE ADULT - SUBJECTIVE AND OBJECTIVE BOX
cc: fall , hip fx     interval hx : pt seen and eval. comfortable.   pain adequately controlled. no fever or chills. npo for sx today, denlindseys cp, sob, cough, n/v/chills . s/p hip sx , pod 2     Vital Signs Last 24 Hrs  T(C): 37 (09 Jun 2019 07:00), Max: 37 (09 Jun 2019 07:00)  T(F): 98.6 (09 Jun 2019 07:00), Max: 98.6 (09 Jun 2019 07:00)  HR: 81 (09 Jun 2019 07:00) (81 - 91)  BP: 115/76 (09 Jun 2019 07:00) (106/65 - 116/78)  BP(mean): --  RR: 19 (09 Jun 2019 07:00) (18 - 20)  SpO2: 93% (09 Jun 2019 07:00) (93% - 95%)    Physical Exam:  · well-developed; well-groomed; well-nourished; no distress  · Eyes	PERRL; EOMI  · Neck Details	supple  · Respiratory Details	airway patent; good air movement; clear to auscultation bilaterally; no rales; no rhonchi; no wheezes  · Cardiovascular Details	no rub  no murmur  · Cardiovascular Details	positive S1; positive S2  · GI Normal	soft; nontender; no distention; bowel sounds normal  · Extremities Details	no clubbing; no cyanosis; no pedal edema  · Neurological Details	alert and oriented x 3; sensation intact; cranial nerves intact  · Musculoskeletal Details	decreased ROM due to pain  · Musculoskeletal Comments	RLE externally post op dressing in place. no edema or bruising noted                              11.3   10.0  )-----------( 161      ( 09 Jun 2019 06:42 )             34.7   06-09    139  |  105  |  21.0<H>  ----------------------------<  124<H>  3.8   |  24.0  |  0.60    Ca    8.3<L>      09 Jun 2019 06:42

## 2019-06-09 NOTE — PROGRESS NOTE ADULT - SUBJECTIVE AND OBJECTIVE BOX
Ortho Progress note    Name: GARIMA ADAMS    MR #: [id number]    Procedure: right hip cemented IMN  Surgeon: Dr. Mcnulty    Pt comfortable without complaints, pain controlled  Pt states she has been participating in PT  Denies CP, SOB, N/V, numbness/tingling     General Exam:  Vital Signs Last 24 Hrs  T(C): 37 (06-09-19 @ 07:00), Max: 37 (06-09-19 @ 07:00)  T(F): 98.6 (06-09-19 @ 07:00), Max: 98.6 (06-09-19 @ 07:00)  HR: 81 (06-09-19 @ 07:00) (81 - 83)  BP: 115/76 (06-09-19 @ 07:00) (115/76 - 116/78)  BP(mean): --  RR: 19 (06-09-19 @ 07:00) (19 - 20)  SpO2: 93% (06-09-19 @ 07:00) (93% - 95%)    General: Pt Alert and oriented, NAD, controlled pain.  RLE Dressings C/D/I.   Dressings removed, prineo in place  No drainage or discharge  New dressings applied  Pulses: 1+ dorsalis pedis pulse. Cap refill < 2 sec.  Sensation: Grossly intact to light touch without deficit.  Motor: + EHL/FHL/TA/GS                        11.3   10.0  )-----------( 161      ( 09 Jun 2019 06:42 )             34.7   09 Jun 2019 06:42    139    |  105    |  21.0   ----------------------------<  124    3.8     |  24.0   |  0.60     Ca    8.3        09 Jun 2019 06:42    A/P: 84y Female POD#2 s/p right hip cemented IMN  - Pain Control  - DVT ppx: Lovenox 40 QD, SCD's  - Continue PT   - Weight bearing status: WBAT  - Continue care per primary team

## 2019-06-10 LAB
ANION GAP SERPL CALC-SCNC: 10 MMOL/L — SIGNIFICANT CHANGE UP (ref 5–17)
ANION GAP SERPL CALC-SCNC: 12 MMOL/L — SIGNIFICANT CHANGE UP (ref 5–17)
BUN SERPL-MCNC: 18 MG/DL — SIGNIFICANT CHANGE UP (ref 8–20)
BUN SERPL-MCNC: 19 MG/DL — SIGNIFICANT CHANGE UP (ref 8–20)
CALCIUM SERPL-MCNC: 8 MG/DL — LOW (ref 8.6–10.2)
CALCIUM SERPL-MCNC: 8.4 MG/DL — LOW (ref 8.6–10.2)
CHLORIDE SERPL-SCNC: 101 MMOL/L — SIGNIFICANT CHANGE UP (ref 98–107)
CHLORIDE SERPL-SCNC: 106 MMOL/L — SIGNIFICANT CHANGE UP (ref 98–107)
CO2 SERPL-SCNC: 25 MMOL/L — SIGNIFICANT CHANGE UP (ref 22–29)
CO2 SERPL-SCNC: 27 MMOL/L — SIGNIFICANT CHANGE UP (ref 22–29)
CREAT SERPL-MCNC: 0.38 MG/DL — LOW (ref 0.5–1.3)
CREAT SERPL-MCNC: 0.52 MG/DL — SIGNIFICANT CHANGE UP (ref 0.5–1.3)
GLUCOSE SERPL-MCNC: 105 MG/DL — SIGNIFICANT CHANGE UP (ref 70–115)
GLUCOSE SERPL-MCNC: 107 MG/DL — SIGNIFICANT CHANGE UP (ref 70–115)
HCT VFR BLD CALC: 34.9 % — LOW (ref 37–47)
HGB BLD-MCNC: 11.7 G/DL — LOW (ref 12–16)
MCHC RBC-ENTMCNC: 31.4 PG — HIGH (ref 27–31)
MCHC RBC-ENTMCNC: 33.5 G/DL — SIGNIFICANT CHANGE UP (ref 32–36)
MCV RBC AUTO: 93.6 FL — SIGNIFICANT CHANGE UP (ref 81–99)
PLATELET # BLD AUTO: 163 K/UL — SIGNIFICANT CHANGE UP (ref 150–400)
POTASSIUM SERPL-MCNC: 3.5 MMOL/L — SIGNIFICANT CHANGE UP (ref 3.5–5.3)
POTASSIUM SERPL-MCNC: 3.6 MMOL/L — SIGNIFICANT CHANGE UP (ref 3.5–5.3)
POTASSIUM SERPL-SCNC: 3.5 MMOL/L — SIGNIFICANT CHANGE UP (ref 3.5–5.3)
POTASSIUM SERPL-SCNC: 3.6 MMOL/L — SIGNIFICANT CHANGE UP (ref 3.5–5.3)
RBC # BLD: 3.73 M/UL — LOW (ref 4.4–5.2)
RBC # FLD: 13.2 % — SIGNIFICANT CHANGE UP (ref 11–15.6)
SODIUM SERPL-SCNC: 138 MMOL/L — SIGNIFICANT CHANGE UP (ref 135–145)
SODIUM SERPL-SCNC: 143 MMOL/L — SIGNIFICANT CHANGE UP (ref 135–145)
WBC # BLD: 11.2 K/UL — HIGH (ref 4.8–10.8)
WBC # FLD AUTO: 11.2 K/UL — HIGH (ref 4.8–10.8)

## 2019-06-10 PROCEDURE — 99232 SBSQ HOSP IP/OBS MODERATE 35: CPT

## 2019-06-10 RX ADMIN — Medication 650 MILLIGRAM(S): at 22:14

## 2019-06-10 RX ADMIN — CARBIDOPA AND LEVODOPA 1 TABLET(S): 25; 100 TABLET ORAL at 07:42

## 2019-06-10 RX ADMIN — Medication 325 MILLIGRAM(S): at 11:32

## 2019-06-10 RX ADMIN — Medication 325 MILLIGRAM(S): at 16:58

## 2019-06-10 RX ADMIN — Medication 500 MILLIGRAM(S): at 16:58

## 2019-06-10 RX ADMIN — Medication 1 MILLIGRAM(S): at 11:33

## 2019-06-10 RX ADMIN — Medication 100 MILLIGRAM(S): at 05:21

## 2019-06-10 RX ADMIN — Medication 650 MILLIGRAM(S): at 21:14

## 2019-06-10 RX ADMIN — CARBIDOPA AND LEVODOPA 1 TABLET(S): 25; 100 TABLET ORAL at 11:33

## 2019-06-10 RX ADMIN — Medication 650 MILLIGRAM(S): at 06:20

## 2019-06-10 RX ADMIN — CARBIDOPA AND LEVODOPA 1 TABLET(S): 25; 100 TABLET ORAL at 16:58

## 2019-06-10 RX ADMIN — ESCITALOPRAM OXALATE 5 MILLIGRAM(S): 10 TABLET, FILM COATED ORAL at 11:32

## 2019-06-10 RX ADMIN — Medication 1 TABLET(S): at 11:33

## 2019-06-10 RX ADMIN — ENOXAPARIN SODIUM 40 MILLIGRAM(S): 100 INJECTION SUBCUTANEOUS at 05:20

## 2019-06-10 RX ADMIN — Medication 325 MILLIGRAM(S): at 07:42

## 2019-06-10 RX ADMIN — Medication 650 MILLIGRAM(S): at 05:24

## 2019-06-10 RX ADMIN — Medication 500 MILLIGRAM(S): at 05:21

## 2019-06-10 RX ADMIN — Medication 25 MILLIGRAM(S): at 05:21

## 2019-06-10 RX ADMIN — CARBIDOPA AND LEVODOPA 1 TABLET(S): 25; 100 TABLET ORAL at 21:15

## 2019-06-10 NOTE — DIETITIAN INITIAL EVALUATION ADULT. - OTHER INFO
Pt admitted s/p mechanical fall with R sided hip pain, R impacted femoral neck fracture- s/p sx pod 2.  Pt reports good po intake at meals and tolerating regular diet.  Pt states good appetite prior to admission and denies wt loss.  Discussed importance of consuming adequate calorie/protein intake at meals- pt demonstrates good understanding.

## 2019-06-10 NOTE — PROGRESS NOTE ADULT - SUBJECTIVE AND OBJECTIVE BOX
cc: fall , hip fx     interval hx : pt seen and eval. comfortable. states that she feels very tired today. and feels somewhat nauseous. no vomiting. doesnt feel well to be dcd to rehab today. pain adequately controlled. no fever or chills. npo for sx today, denlindseys cp, sob, cough, chills . s/p hip sx , pod 2                           11.7   11.2  )-----------( 163      ( 10 Slava 2019 06:22 )             34.9   06-10    143  |  106  |  18.0  ----------------------------<  105  3.6   |  25.0  |  0.38<L>    Ca    8.0<L>      10 Slava 2019 06:22      Physical Exam:  · well-developed; well-groomed; well-nourished; no distress  · Eyes	PERRL; EOMI  · Neck Details	supple  · Respiratory Details	airway patent; good air movement; clear to auscultation bilaterally; no rales; no rhonchi; no wheezes  · Cardiovascular Details	no rub  no murmur  · Cardiovascular Details	positive S1; positive S2  · GI Normal	soft; nontender; no distention; bowel sounds normal  · Extremities Details	no clubbing; no cyanosis; no pedal edema  · Neurological Details	alert and oriented x 3; sensation intact; cranial nerves intact  · Musculoskeletal Details	decreased ROM due to pain  · Musculoskeletal Comments	RLE externally post op dressing in place. no edema or bruising noted                             11.7   11.2  )-----------( 163      ( 10 Slava 2019 06:22 )             34.9   06-10    143  |  106  |  18.0  ----------------------------<  105  3.6   |  25.0  |  0.38<L>    Ca    8.0<L>      10 Slava 2019 06:22

## 2019-06-10 NOTE — PROGRESS NOTE ADULT - SUBJECTIVE AND OBJECTIVE BOX
ORTHOPEDIC POST-OP PROGRESS NOTE:    Name: GARIMA ADAMS    MR #: [id number]    Procedure: Intramedulary nail of Right Hip       DOS: 6/7/2019      Patient is POD #3, doing well, comfortable without complaints, pain controlled. Patient reports improved ability to ambulate stating her pain is tolerable when working with physical therapy. Denies CP, SOB, N/V, numbness/tingling.                             11.7   11.2  )-----------( 163      ( 10 Slava 2019 06:22 )             34.9       10 Slava 2019 06:22    143    |  106    |  18.0   ----------------------------<  105    3.6     |  25.0   |  0.38     Ca    8.0        10 Slava 2019 06:22        Vital Signs Last 24 Hrs  T(C): 36.7 (06-10-19 @ 07:05), Max: 36.7 (06-10-19 @ 07:05)  T(F): 98.1 (06-10-19 @ 07:05), Max: 98.1 (06-10-19 @ 07:05)  HR: 86 (06-10-19 @ 07:05) (86 - 86)  BP: 100/75 (06-10-19 @ 07:05) (100/75 - 100/75)  BP(mean): --  RR: 18 (06-10-19 @ 07:05) (18 - 18)  SpO2: 94% (06-10-19 @ 07:05) (94% - 94%)      General Exam: A&Ox3, NAD, controlled pain  Physical exam: The right hip dressing is clean, dry and intact. No drainage or discharge. No erythema is noted. No blistering. No ecchymosis. The calf is supple nontender B/L. Passive range of motion is acceptable to due postoperative pain. Sensation to light touch is grossly intact distally. The lateral cutaneous nerve is intact. Motor function distally is 5/5. No foot drop. 2+ dorsalis pedis pulse. Capillary refill is less than 2 seconds. No cyanosis.    A/P: 84y Female  POD#3 s/p Right Hip IMN    - Stable  - Pain Control  - DVT ppx as prescribed  - PT eval  - Weight bearing status: WBAT ORTHOPEDIC POST-OP PROGRESS NOTE:    Name: GARIMA ADAMS    MR #: [id number]    Procedure: Intramedulary nail of Right Hip       DOS: 6/7/2019      Patient is POD #3, doing well, comfortable without complaints, pain controlled. Patient reports improved ability to ambulate stating her pain is tolerable when working with physical therapy. Denies CP, SOB, N/V, numbness/tingling.                             11.7   11.2  )-----------( 163      ( 10 Slava 2019 06:22 )             34.9       10 Slava 2019 06:22    143    |  106    |  18.0   ----------------------------<  105    3.6     |  25.0   |  0.38     Ca    8.0        10 Slava 2019 06:22        Vital Signs Last 24 Hrs  T(C): 36.7 (06-10-19 @ 07:05), Max: 36.7 (06-10-19 @ 07:05)  T(F): 98.1 (06-10-19 @ 07:05), Max: 98.1 (06-10-19 @ 07:05)  HR: 86 (06-10-19 @ 07:05) (86 - 86)  BP: 100/75 (06-10-19 @ 07:05) (100/75 - 100/75)  BP(mean): --  RR: 18 (06-10-19 @ 07:05) (18 - 18)  SpO2: 94% (06-10-19 @ 07:05) (94% - 94%)      General Exam: A&Ox3, NAD, controlled pain  Physical exam: The right hip dressing is clean, dry and intact. No drainage or discharge. No erythema is noted. No blistering. No ecchymosis. The calf is supple nontender B/L. Passive range of motion is acceptable to due postoperative pain. Sensation to light touch is grossly intact distally. The lateral cutaneous nerve is intact. Motor function distally is 5/5. No foot drop. 2+ dorsalis pedis pulse. Capillary refill is less than 2 seconds. No cyanosis.    A/P: 84y Female  POD#3 s/p Right Hip IMN    - Stable  - Pain Control  - DVT ppx as prescribed  - Continue PT  - Weight bearing status: WBAT ORTHOPEDIC POST-OP PROGRESS NOTE:    Name: GARIMA ADAMS    MR #: [id number]    Procedure: Intramedulary nail of Right Hip       DOS: 6/7/2019      Patient is POD #3, doing well, comfortable without complaints, pain controlled. Patient reports improved ability to ambulate stating her pain is tolerable when working with physical therapy. Denies CP, SOB, N/V, numbness/tingling.                             11.7   11.2  )-----------( 163      ( 10 Slava 2019 06:22 )             34.9       10 Slava 2019 06:22    143    |  106    |  18.0   ----------------------------<  105    3.6     |  25.0   |  0.38     Ca    8.0        10 Slava 2019 06:22        Vital Signs Last 24 Hrs  T(C): 36.7 (06-10-19 @ 07:05), Max: 36.7 (06-10-19 @ 07:05)  T(F): 98.1 (06-10-19 @ 07:05), Max: 98.1 (06-10-19 @ 07:05)  HR: 86 (06-10-19 @ 07:05) (86 - 86)  BP: 100/75 (06-10-19 @ 07:05) (100/75 - 100/75)  BP(mean): --  RR: 18 (06-10-19 @ 07:05) (18 - 18)  SpO2: 94% (06-10-19 @ 07:05) (94% - 94%)      General Exam: A&Ox3, NAD, controlled pain  Physical exam: The right hip dressing is clean, dry and intact. No drainage or discharge. No erythema is noted. No blistering. No ecchymosis. The calf is supple nontender B/L. Passive range of motion is acceptable to due postoperative pain. Sensation to light touch is grossly intact distally. Motor function distally is 5/5. No foot drop. 2+ dorsalis pedis pulse. Capillary refill is less than 2 seconds. No cyanosis.    A/P: 84y Female  POD#3 s/p Right Hip IMN    - Stable  - Pain Control  - DVT ppx as prescribed  - Continue PT  - Weight bearing status: WBAT ORTHOPEDIC POST-OP PROGRESS NOTE:    Name: GARIMA ADAMS    MR #: [id number]    Procedure: Intramedulary nail of Right Hip       DOS: 6/7/2019      Patient is POD #3, doing well, comfortable without complaints, pain controlled. Patient reports improved ability to ambulate stating her pain is tolerable when working with physical therapy. Denies CP, SOB, N/V, numbness/tingling.                             11.7   11.2  )-----------( 163      ( 10 Slava 2019 06:22 )             34.9       10 Slava 2019 06:22    143    |  106    |  18.0   ----------------------------<  105    3.6     |  25.0   |  0.38     Ca    8.0        10 Slava 2019 06:22        Vital Signs Last 24 Hrs  T(C): 36.7 (06-10-19 @ 07:05), Max: 36.7 (06-10-19 @ 07:05)  T(F): 98.1 (06-10-19 @ 07:05), Max: 98.1 (06-10-19 @ 07:05)  HR: 86 (06-10-19 @ 07:05) (86 - 86)  BP: 100/75 (06-10-19 @ 07:05) (100/75 - 100/75)  BP(mean): --  RR: 18 (06-10-19 @ 07:05) (18 - 18)  SpO2: 94% (06-10-19 @ 07:05) (94% - 94%)      General Exam: A&Ox3, NAD, controlled pain  Physical exam: The right hip dressing is clean, dry and intact. No drainage or discharge. No erythema is noted. No blistering. No ecchymosis. The calf is supple nontender B/L. Passive range of motion is acceptable to due postoperative pain. Sensation to light touch is grossly intact distally. Motor function distally is 5/5. No foot drop. 2+ dorsalis pedis pulse. Capillary refill is less than 2 seconds. No cyanosis.    < from: CT Pelvis Bony Only No Cont (06.08.19 @ 10:22) >   EXAM:  CT PELVIS BONY ONLY                         EXAM:  CT 3D RECONSTRUCT W ANETA                          PROCEDURE DATE:  06/08/2019          INTERPRETATION:  CT PELVIS BONY ONLY, CT 3D RECONSTRUCT W ANETA dated   6/8/2019 10:22 AM     INDICATION: Right hip pain after intramedullary nailing    COMPARISON: Pelvic radiographs dated 6/7/2019. CT of the pelvis dated   6/6/2019    TECHNIQUE: CT imaging of the pelvis was performed. The data was   reformatted in the axial, coronal, and sagittal planes. Additionally, 3-D   reformatted imaging was created at a separate workstation.    FINDINGS:    OSSEOUS STRUCTURES: The patient is status post interval ORIF of the right   hip with intramedullary ken. There is interval cement placement within   the right femoral head. A small amount of cement is seen extravasated   along the superolateral contour of the right femoral head with mild   extension into the joint space compatible with nontarget cement. Small   cement is seen along the anterior inferior femoral head. There is a mild   step off at the right lesser trochanteric base in keeping with a   nondisplaced fracture. There is persistent fracture deformity of the   right femoral neck. Improved alignment when compared with the prior CT.   Moderate degenerative changes and dextrocurvature of the lower lumbar   spine with vacuum phenomenon at the lumbar spine levels. Spurring and   vacuum phenomenon at both sacroiliac joints. Spurring at the pubic   symphysis.  SYNOVIUM/ JOINT FLUID: There is no hip joint effusion.  TENDONS: The proximal hamstring tendons are intact. Iliopsoas tendons are   preserved. No full-thickness tear or retraction is seen.  MUSCLES: Small amount of subcutaneous gas between the abductor   musculature inkeeping with recent surgery. Small right gluteus medius   muscle hematoma. The muscle bulk is otherwise symmetric.  NEUROVASCULAR STRUCTURES: Mild vascular calcifications.  INTRAPELVIC SOFT TISSUES: There is suture material within the distal   sigmoid colon. Atrophy of the kidneys, worse on the left. Moderately   enlarged bladder.  SUBCUTANEOUS SOFT TISSUES: There is mild soft tissue swelling overlying   the right hip.    3-D reformatted imaging confirms these findings.    IMPRESSION:    1.  Status post ORIF of the right hip with intramedullary ken and   transcervical screw with improved anatomic alignment when compared to   prior study.  2.  Cement within the right femoral head with nontarget cement   extravasating along the superolateral femoral head with intra-articular   extension. Small segment along the anteroinferior femoral head .  3.  Moderately enlarged urinary bladder, correlate clinically.  4.  Degenerative changes as above.  5.  Soft tissue swelling gas overlying the right hip most consistent with   recent surgery. Differential includes infection but thought less likely   given the recent surgery.                QUENTIN PEREZ M.D., ATTENDING RADIOLOGIST  This document has been electronically signed. Jun 8 2019 11:01AM    < end of copied text >    A/P: 84y Female  POD#3 s/p Right Hip IMN    - Stable  - CT done, d/w Dr. Ram  - Pain Control  - DVT ppx as prescribed  - Continue PT  - Weight bearing status: WBAT

## 2019-06-11 ENCOUNTER — TRANSCRIPTION ENCOUNTER (OUTPATIENT)
Age: 84
End: 2019-06-11

## 2019-06-11 VITALS
DIASTOLIC BLOOD PRESSURE: 73 MMHG | SYSTOLIC BLOOD PRESSURE: 110 MMHG | RESPIRATION RATE: 18 BRPM | TEMPERATURE: 98 F | OXYGEN SATURATION: 93 % | HEART RATE: 100 BPM

## 2019-06-11 LAB
HCT VFR BLD CALC: 35.1 % — LOW (ref 37–47)
HGB BLD-MCNC: 11.4 G/DL — LOW (ref 12–16)
MCHC RBC-ENTMCNC: 30.5 PG — SIGNIFICANT CHANGE UP (ref 27–31)
MCHC RBC-ENTMCNC: 32.5 G/DL — SIGNIFICANT CHANGE UP (ref 32–36)
MCV RBC AUTO: 93.9 FL — SIGNIFICANT CHANGE UP (ref 81–99)
PLATELET # BLD AUTO: 200 K/UL — SIGNIFICANT CHANGE UP (ref 150–400)
RBC # BLD: 3.74 M/UL — LOW (ref 4.4–5.2)
RBC # FLD: 13.3 % — SIGNIFICANT CHANGE UP (ref 11–15.6)
WBC # BLD: 7.8 K/UL — SIGNIFICANT CHANGE UP (ref 4.8–10.8)
WBC # FLD AUTO: 7.8 K/UL — SIGNIFICANT CHANGE UP (ref 4.8–10.8)

## 2019-06-11 PROCEDURE — 99239 HOSP IP/OBS DSCHRG MGMT >30: CPT

## 2019-06-11 RX ORDER — ACETAMINOPHEN 500 MG
2 TABLET ORAL
Qty: 0 | Refills: 0 | DISCHARGE
Start: 2019-06-11

## 2019-06-11 RX ORDER — ENOXAPARIN SODIUM 100 MG/ML
40 INJECTION SUBCUTANEOUS
Qty: 0 | Refills: 0 | DISCHARGE
Start: 2019-06-11

## 2019-06-11 RX ORDER — METOPROLOL TARTRATE 50 MG
1 TABLET ORAL
Qty: 0 | Refills: 0 | DISCHARGE
Start: 2019-06-11

## 2019-06-11 RX ORDER — DIPHENHYDRAMINE HCL 50 MG
1 CAPSULE ORAL
Qty: 0 | Refills: 0 | DISCHARGE
Start: 2019-06-11

## 2019-06-11 RX ORDER — FERROUS SULFATE 325(65) MG
1 TABLET ORAL
Qty: 90 | Refills: 0
Start: 2019-06-11 | End: 2019-07-10

## 2019-06-11 RX ORDER — CARBIDOPA AND LEVODOPA 25; 100 MG/1; MG/1
1 TABLET ORAL
Qty: 0 | Refills: 0 | DISCHARGE
Start: 2019-06-11

## 2019-06-11 RX ORDER — DOCUSATE SODIUM 100 MG
1 CAPSULE ORAL
Qty: 0 | Refills: 0 | DISCHARGE
Start: 2019-06-11

## 2019-06-11 RX ORDER — ESCITALOPRAM OXALATE 10 MG/1
1 TABLET, FILM COATED ORAL
Qty: 0 | Refills: 0 | DISCHARGE
Start: 2019-06-11

## 2019-06-11 RX ORDER — FOLIC ACID 0.8 MG
1 TABLET ORAL
Qty: 0 | Refills: 0 | DISCHARGE
Start: 2019-06-11

## 2019-06-11 RX ORDER — ASCORBIC ACID 60 MG
1 TABLET,CHEWABLE ORAL
Qty: 0 | Refills: 0 | DISCHARGE
Start: 2019-06-11

## 2019-06-11 RX ORDER — OXYCODONE HYDROCHLORIDE 5 MG/1
1 TABLET ORAL
Qty: 0 | Refills: 0 | DISCHARGE
Start: 2019-06-11

## 2019-06-11 RX ADMIN — CARBIDOPA AND LEVODOPA 1 TABLET(S): 25; 100 TABLET ORAL at 15:14

## 2019-06-11 RX ADMIN — Medication 500 MILLIGRAM(S): at 05:31

## 2019-06-11 RX ADMIN — Medication 650 MILLIGRAM(S): at 13:06

## 2019-06-11 RX ADMIN — CARBIDOPA AND LEVODOPA 1 TABLET(S): 25; 100 TABLET ORAL at 12:07

## 2019-06-11 RX ADMIN — Medication 650 MILLIGRAM(S): at 05:31

## 2019-06-11 RX ADMIN — CARBIDOPA AND LEVODOPA 1 TABLET(S): 25; 100 TABLET ORAL at 09:14

## 2019-06-11 RX ADMIN — Medication 650 MILLIGRAM(S): at 12:06

## 2019-06-11 RX ADMIN — ESCITALOPRAM OXALATE 5 MILLIGRAM(S): 10 TABLET, FILM COATED ORAL at 12:07

## 2019-06-11 RX ADMIN — Medication 325 MILLIGRAM(S): at 12:07

## 2019-06-11 RX ADMIN — Medication 325 MILLIGRAM(S): at 09:14

## 2019-06-11 RX ADMIN — Medication 25 MILLIGRAM(S): at 05:31

## 2019-06-11 RX ADMIN — Medication 1 MILLIGRAM(S): at 12:07

## 2019-06-11 RX ADMIN — Medication 650 MILLIGRAM(S): at 05:32

## 2019-06-11 RX ADMIN — ENOXAPARIN SODIUM 40 MILLIGRAM(S): 100 INJECTION SUBCUTANEOUS at 05:31

## 2019-06-11 RX ADMIN — Medication 1 TABLET(S): at 12:07

## 2019-06-11 NOTE — PROGRESS NOTE ADULT - REASON FOR ADMISSION
Worsening Right hip pain

## 2019-06-11 NOTE — PROGRESS NOTE ADULT - SUBJECTIVE AND OBJECTIVE BOX
cc: fall , hip fx     interval hx : pt seen and eval. comfortable. pain well controlled. . no vomiting. doesnt feel well to be dcd to rehab today. pain adequately controlled. no fever or chills. npo for sx today, olyas cp, sob, cough, chills . s/p hip sx , pod 2                 Vital Signs Last 24 Hrs  T(C): 36.9 (11 Jun 2019 07:00), Max: 36.9 (10 Slava 2019 22:17)  T(F): 98.5 (11 Jun 2019 07:00), Max: 98.5 (11 Jun 2019 07:00)  HR: 94 (11 Jun 2019 07:00) (83 - 104)  BP: 100/63 (11 Jun 2019 07:00) (100/63 - 110/63)  BP(mean): --  RR: 18 (11 Jun 2019 07:00) (18 - 18)  SpO2: 92% (11 Jun 2019 07:00) (92% - 95%)    Physical Exam:  · well-developed; well-groomed; well-nourished; no distress  · Eyes	PERRL; EOMI  · Neck Details	supple  · Respiratory Details	airway patent; good air movement; clear to auscultation bilaterally; no rales; no rhonchi; no wheezes  · Cardiovascular Details	no rub  no murmur  · Cardiovascular Details	positive S1; positive S2  · GI Normal	soft; nontender; no distention; bowel sounds normal  · Extremities Details	no clubbing; no cyanosis; no pedal edema  · Neurological Details	alert and oriented x 3; sensation intact; cranial nerves intact  · Musculoskeletal Details	decreased ROM due to pain  · Musculoskeletal Comments	RLE externally post op dressing in place. no edema or bruising noted                           11.4   7.8   )-----------( 200      ( 11 Jun 2019 07:24 )             35.1   06-10    138  |  101  |  19.0  ----------------------------<  107  3.5   |  27.0  |  0.52    Ca    8.4<L>      10 Jun 2019 19:20

## 2019-06-11 NOTE — PROGRESS NOTE ADULT - SUBJECTIVE AND OBJECTIVE BOX
GARIMA ADAMS    [id number]    History: Patient is status post right hip IMN on 6/7/2019, pod #4. Patient is doing well. The patient's pain is controlled using the prescribed pain medications. The patient is participating in physical therapy. Denies nausea, vomiting, chest pain, shortness of breath, abdominal pain or fever. No new complaints.                              11.4   7.8   )-----------( 200      ( 11 Jun 2019 07:24 )             35.1         Physical exam: The right hip dressings clean, dry and intact. No drainage or discharge. No erythema is noted. No blistering. No ecchymosis. The calf is supple nontender. No calf tenderness. Sensation to light touch is grossly intact distally. Motor function distally is 5/5. No foot drop. 2+ dorsalis pedis pulse. Capillary refill is less than 2 seconds. No cyanosis.    Primary Orthopedic Assessment:  • s/p RIGHT hip IMN pod #4        Plan:   • DVT prophylaxis, lovenox, including use of compression devices and ankle pumps  • Continue physical therapy  • Weightbearing as tolerated of the right lower extremity with assistance of a walker  • Incentive spirometry encouraged  • Pain control as clinically indicated  • Posterior hip precautions reviewed with patient  • Discharge planning – anticipated discharge is rehab when medically stable

## 2019-06-11 NOTE — DISCHARGE NOTE NURSING/CASE MANAGEMENT/SOCIAL WORK - NSDCDPATPORTLINK_GEN_ALL_CORE
You can access the [facility] Patient Portal, offered by [facility], by registering with the following website: [email or web address]

## 2019-06-12 ENCOUNTER — CHART COPY (OUTPATIENT)
Age: 84
End: 2019-06-12

## 2019-06-26 ENCOUNTER — APPOINTMENT (OUTPATIENT)
Dept: UROGYNECOLOGY | Facility: CLINIC | Age: 84
End: 2019-06-26

## 2019-07-10 ENCOUNTER — APPOINTMENT (OUTPATIENT)
Dept: ORTHOPEDIC SURGERY | Facility: CLINIC | Age: 84
End: 2019-07-10
Payer: MEDICARE

## 2019-07-10 PROCEDURE — 73502 X-RAY EXAM HIP UNI 2-3 VIEWS: CPT | Mod: RT

## 2019-07-10 PROCEDURE — 99024 POSTOP FOLLOW-UP VISIT: CPT

## 2019-07-10 NOTE — HISTORY OF PRESENT ILLNESS
[___ Weeks Post Op] : [unfilled] weeks post op [Xray (Date:___)] : [unfilled] Xray -  [Healed] : healed [Neuro Intact] : an unremarkable neurological exam [Vascular Intact] : ~T peripheral vascular exam normal [Hardware in Good Position] : hardware in good position [Doing Well] : is doing well [Fair Pain Control] : has fair pain control [No Sign of Infection] : is showing no signs of infection [Chills] : no chills [Fever] : no fever [Erythema] : not erythematous [Discharge] : absent of discharge [Clean/Dry/Intact] : clean, dry and intact [Swelling] : not swollen [Dehiscence] : not dehisced [Good Overall Alignment] : good overall alignment [de-identified] :  Intramedullary nail, right intertrochanteric hip fracture.\par  Right hip arthrogram under anesthesia.DOS:06/07/19\par  [de-identified] : 83 yo f s/p  Intramedullary nail, right intertrochanteric hip fracture.DOS:06/07/19 here for pop visit.  Patient reports pain in her groin 6/10 when standing and walking.  She also c/o LBP which she has had in the past.  She was discharged from [facility] rehab 1 week ago and is currently at home receiving home Physical therapy.  She is accompanied by a family member.  \par  [de-identified] : xray right hip 2 views . ome concern for a small amount of nontarget cement on the anterolateral aspect of the femoral neck [de-identified] : Patient will continue with physical therapy at home for WBAT, ROM, strengthening, modalities, core exercises, gait training and balance.  Once she completes home PT she can start outpatient PT.  She will return to office in 1 month for eval and xrays right hip\par \par Orthopaedic Trauma Surgeon Addendum:\par \par I agree with the above physician assistant note.  Appropriate imaging has been reviewed and the plan adjusted as needed.\par \par Orlando Ram MD\par Orthopaedic Trauma Surgeon\par [facility]\par [facility] [place]\par

## 2019-07-13 ENCOUNTER — RX RENEWAL (OUTPATIENT)
Age: 84
End: 2019-07-13

## 2019-07-16 ENCOUNTER — RX RENEWAL (OUTPATIENT)
Age: 84
End: 2019-07-16

## 2019-07-22 ENCOUNTER — CLINICAL ADVICE (OUTPATIENT)
Age: 84
End: 2019-07-22

## 2019-07-22 ENCOUNTER — MEDICATION RENEWAL (OUTPATIENT)
Age: 84
End: 2019-07-22

## 2019-08-06 ENCOUNTER — APPOINTMENT (OUTPATIENT)
Dept: UROGYNECOLOGY | Facility: CLINIC | Age: 84
End: 2019-08-06
Payer: MEDICARE

## 2019-08-06 PROCEDURE — L8606: CPT

## 2019-08-06 PROCEDURE — 51715 ENDOSCOPIC INJECTION/IMPLANT: CPT

## 2019-08-06 RX ORDER — CEPHALEXIN 500 MG/1
500 TABLET ORAL EVERY 8 HOURS
Qty: 21 | Refills: 0 | Status: DISCONTINUED | COMMUNITY
Start: 2019-04-19 | End: 2019-08-06

## 2019-08-13 ENCOUNTER — APPOINTMENT (OUTPATIENT)
Dept: ORTHOPEDIC SURGERY | Facility: CLINIC | Age: 84
End: 2019-08-13
Payer: MEDICARE

## 2019-08-13 PROCEDURE — 73502 X-RAY EXAM HIP UNI 2-3 VIEWS: CPT | Mod: RT

## 2019-08-13 PROCEDURE — 99024 POSTOP FOLLOW-UP VISIT: CPT

## 2019-08-13 NOTE — HISTORY OF PRESENT ILLNESS
[Healed] : healed [Neuro Intact] : an unremarkable neurological exam [Vascular Intact] : ~T peripheral vascular exam normal [Xray (Date:___)] : [unfilled] Xray -  [Hardware in Good Position] : hardware in good position [Doing Well] : is doing well [No Sign of Infection] : is showing no signs of infection [Adequate Pain Control] : has adequate pain control [Chills] : no chills [Fever] : no fever [Discharge] : absent of discharge [Erythema] : not erythematous [Dehiscence] : not dehisced [Swelling] : not swollen [None] : None [de-identified] : s/p intramedullary nail, right intertrochanteric hip fracture. 6/7/19 [de-identified] : 83 yo f s/p IM nail right hip fracture here for pop visit.  Patient is doing well.  She just finished home PT and started Outpatient PT yesterday.  She c/o feeling a little sore today.  She is ambulating with an assistive device.   [de-identified] : Physical Exam:\par General: Well appearing, no acute distress, A&O\par Neurologic: A&Ox3, No focal deficits\par Head: NCAT without abrasions, lacerations, or ecchymosis to head, face, or scalp\par Respiratory: Equal chest wall expansion bilaterally, no accessory muscle use\par Lymphatic: No lymphadenopathy palpated\par Skin: Warm and dry\par Psychiatric: Normal mood and affect [de-identified] : xrays right hip 2 views: [de-identified] : Patient will continue with Physical therapy WBAT, ROM, strengthening, modalites.  She will call office for any issues, otherwise she can return on a prn basis.\par \par Orthopaedic Trauma Surgeon Addendum:\par \par I agree with the above physician assistant note.  Appropriate imaging has been reviewed and the plan adjusted as needed.\par \par Orlando Ram MD\par Orthopaedic Trauma Surgeon\par [facility]\par [facility] [place]\par

## 2019-09-03 ENCOUNTER — APPOINTMENT (OUTPATIENT)
Dept: UROGYNECOLOGY | Facility: CLINIC | Age: 84
End: 2019-09-03
Payer: MEDICARE

## 2019-09-03 ENCOUNTER — RESULT CHARGE (OUTPATIENT)
Age: 84
End: 2019-09-03

## 2019-09-03 VITALS — SYSTOLIC BLOOD PRESSURE: 116 MMHG | DIASTOLIC BLOOD PRESSURE: 77 MMHG

## 2019-09-03 LAB
BILIRUB UR QL STRIP: NEGATIVE
CLARITY UR: CLEAR
COLLECTION METHOD: NORMAL
GLUCOSE UR-MCNC: NEGATIVE
HCG UR QL: 1 EU/DL
HGB UR QL STRIP.AUTO: NEGATIVE
KETONES UR-MCNC: NEGATIVE
LEUKOCYTE ESTERASE UR QL STRIP: NORMAL
NITRITE UR QL STRIP: NEGATIVE
PH UR STRIP: 6
PROT UR STRIP-MCNC: NEGATIVE
SP GR UR STRIP: 1.02

## 2019-09-03 PROCEDURE — 51798 US URINE CAPACITY MEASURE: CPT

## 2019-09-03 PROCEDURE — 81003 URINALYSIS AUTO W/O SCOPE: CPT | Mod: QW

## 2019-09-03 PROCEDURE — 99213 OFFICE O/P EST LOW 20 MIN: CPT | Mod: 25

## 2019-09-03 NOTE — ASSESSMENT
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 Now, s/p failed macroplastique periurethral injections due to lack of efficacy. We reviewed YAW vs OAB and invasive vs noninvasive options. For the GSUI/ISD, we discussed MUS, however she is not looking to go to the OR and for surgery at present. Therefore, even though she is more bothered by GSUI than OAB-wet, she is interested in pursuing 3rd line office OAB managemnet options. We reviewed PTNS and bladder botox injections. We discussed that Parkinson's is not a contraindication to botox, however based on her change in recent ? Parkinson meds, I would like her to discuss clearance for botox with her neurologist. She understood and agreed. If cleared, we will proceed. Risk ssuch as allergy, spread, weakness, dysphagia, UTIs, hematuria, and retention reviewed. She would unlikely be able to perform CIC, therefore henley catheterization would be required in the setting of retention. Based on counseling, if proceeding, I would prefer to try 100 units as opposed to 200 units. All questions answered.

## 2019-09-03 NOTE — PHYSICAL EXAM
[No Acute Distress] : in no acute distress [Oriented x3] : oriented to person, place, and time [Post Void Residual ____ml] : post void residual via catheterization was [unfilled] ml

## 2019-09-03 NOTE — HISTORY OF PRESENT ILLNESS
[FreeTextEntry1] : Bothersome CHAVEZ, YAW > OAB. UDS +GSUI and ISD as noted below. Now, 2 weeks s/p office Macroplastique injecitons, voided post-procedure in office prior to going home. Today, reports no improvement in urinary symptoms s/p the periurethral bulking injections. Mostly bothered by large leakage episodes when going from sitting to standing positional change. Today, deneis dysuria, gross hematuria, flank pain, or incomplete bladder emptying. \par \par Recent new Parkinson's medication started, reports no improvement in rigidity, however noted hypotension - is going to discuss continuing vs DCing with her neurologist.\par \par Atrophy, urethral caruncle, LS - on topical estrogen and clobetasol. \par Parkinson's\par S/P ortho surg in June of this year, still doing PT, reports no issues/complications or infections however still with postop pain

## 2019-09-26 PROBLEM — Z00.00 ENCOUNTER FOR PREVENTIVE HEALTH EXAMINATION: Noted: 2019-09-26

## 2019-10-04 ENCOUNTER — APPOINTMENT (OUTPATIENT)
Dept: UROGYNECOLOGY | Facility: CLINIC | Age: 84
End: 2019-10-04

## 2019-10-04 ENCOUNTER — APPOINTMENT (OUTPATIENT)
Dept: UROGYNECOLOGY | Facility: CLINIC | Age: 84
End: 2019-10-04
Payer: MEDICARE

## 2019-10-04 PROCEDURE — 52287 CYSTOSCOPY CHEMODENERVATION: CPT

## 2019-10-07 ENCOUNTER — APPOINTMENT (OUTPATIENT)
Dept: ORTHOPEDIC SURGERY | Facility: CLINIC | Age: 84
End: 2019-10-07

## 2019-10-11 ENCOUNTER — APPOINTMENT (OUTPATIENT)
Dept: DERMATOLOGY | Facility: CLINIC | Age: 84
End: 2019-10-11
Payer: MEDICARE

## 2019-10-11 PROCEDURE — 99214 OFFICE O/P EST MOD 30 MIN: CPT

## 2019-10-18 ENCOUNTER — RESULT CHARGE (OUTPATIENT)
Age: 84
End: 2019-10-18

## 2019-10-18 ENCOUNTER — APPOINTMENT (OUTPATIENT)
Dept: UROGYNECOLOGY | Facility: CLINIC | Age: 84
End: 2019-10-18
Payer: MEDICARE

## 2019-10-18 VITALS — SYSTOLIC BLOOD PRESSURE: 127 MMHG | DIASTOLIC BLOOD PRESSURE: 77 MMHG

## 2019-10-18 LAB
BILIRUB UR QL STRIP: NEGATIVE
CLARITY UR: CLEAR
COLLECTION METHOD: NORMAL
GLUCOSE UR-MCNC: NEGATIVE
HCG UR QL: 0.2 EU/DL
HGB UR QL STRIP.AUTO: NEGATIVE
KETONES UR-MCNC: NORMAL
LEUKOCYTE ESTERASE UR QL STRIP: NEGATIVE
NITRITE UR QL STRIP: NEGATIVE
PH UR STRIP: 5.5
PROT UR STRIP-MCNC: NEGATIVE
SP GR UR STRIP: 1.02

## 2019-10-18 PROCEDURE — 99214 OFFICE O/P EST MOD 30 MIN: CPT

## 2019-10-18 PROCEDURE — 51798 US URINE CAPACITY MEASURE: CPT

## 2019-10-18 PROCEDURE — 81003 URINALYSIS AUTO W/O SCOPE: CPT | Mod: QW

## 2019-10-18 NOTE — DISCUSSION/SUMMARY
[FreeTextEntry1] : Left sided Vulvar lesion suspicious for Vulvar Cancer.  Previous biopsy was right sided positive for LS.  Has not used Clobetasol for 2 months, uncertain when the lesion appeared.  It bleeds slightly on her pad.  She has a history of squamous cell CA Left arm, Daughter, dies from Melanoma in her 50's.  She was given the name and # of Dr Abraham and advised to see him ASAP.  Dr Carvalho was in to look at the lesion.  Patient is considering repeating the Botox in about 6 months before she goes to [place].  Patient stated her comprehension of our visit and her agreement with this plan.

## 2019-10-18 NOTE — PHYSICAL EXAM
[de-identified] : see above this observation is with the naked eye, no enhancement. [de-identified] : Hemorrhagic lesion Left labia Majora edge, 1X2 cm ovoid, areas of thickened epithelium and induration distally.

## 2019-10-18 NOTE — HISTORY OF PRESENT ILLNESS
[FreeTextEntry1] : This 85 yo woman presents with history of Parkinson Dz, SAMUEL,BSO, expl. lap for endometriosis, MVP Surgery, atrophy, urethral caruncle, GUSI, CHAVEZ, ISD, Right sided biopsy positive LS 6/26/19, who was treated with topical Estrogen, Clobetasol.  UroD 5/7/19 YAW at capacity.  Cysto with Macroplastique bulking 8/6/19, not helpful and Cysto with Botox 10/4/19.  Day 13, reports 90% improvement in urinary symptoms.\par She is asking me to look at a Left sided labial lesion that her dermatologist saw and did not what to do and knew she was coming here.  She states that she sees spots of blood on her pad and that there are no other symptoms associated with it.  In addition, she has not been using topicals for about 2 months.

## 2019-10-21 ENCOUNTER — RX RENEWAL (OUTPATIENT)
Age: 84
End: 2019-10-21

## 2019-10-31 ENCOUNTER — APPOINTMENT (OUTPATIENT)
Dept: GYNECOLOGIC ONCOLOGY | Facility: CLINIC | Age: 84
End: 2019-10-31

## 2019-10-31 ENCOUNTER — APPOINTMENT (OUTPATIENT)
Dept: GYNECOLOGIC ONCOLOGY | Facility: CLINIC | Age: 84
End: 2019-10-31
Payer: MEDICARE

## 2019-10-31 VITALS
OXYGEN SATURATION: 94 % | HEART RATE: 79 BPM | WEIGHT: 128 LBS | BODY MASS INDEX: 21.85 KG/M2 | RESPIRATION RATE: 16 BRPM | SYSTOLIC BLOOD PRESSURE: 133 MMHG | HEIGHT: 64 IN | DIASTOLIC BLOOD PRESSURE: 77 MMHG

## 2019-10-31 PROCEDURE — 99205 OFFICE O/P NEW HI 60 MIN: CPT

## 2019-10-31 NOTE — OB HISTORY
[Total Preg ___] : : [unfilled] [Vaginal ___] : [unfilled] vaginal delivery(s) [Ectopics ___] : [unfilled] ectopic pregnancies [Definite ___ (Date)] : the last menstrual period was [unfilled]

## 2019-10-31 NOTE — END OF VISIT
[FreeTextEntry3] : Written by Alyse Kimball PA-C, acting as scribe for Dr. Marine Abraham.\par  [FreeTextEntry2] : This note accurately reflects the work and decisions made by me.\par

## 2019-10-31 NOTE — HISTORY OF PRESENT ILLNESS
[FreeTextEntry1] : 83yo  LMP age 44 presents with referral from Dr. Mtz for L. sided vulvar lesion. Patient reports she noticed lesion about one year ago. She also oted light blood on tissue paper when wiping over the past 3 months and believes the bleeding is coming from the lesion. She reports history of lichen sclerosis for which she takes clobetasol cream occasionally. She also reports total hysterectomy with BSO performed at age 44 for endometriosis. She denies any itching of lesion or abnormal vaginal discharge. \par \par LPAP- age 42, normal. \par LMammo- 2018, normal\par LColonoscopy- , normal\par LBone Density Scan- , normal\par \par \par

## 2019-10-31 NOTE — PHYSICAL EXAM
[Normal] : Bimanual Exam: Normal [Abnormal] : External genitalia: Abnormal [de-identified] : lesion of left labia minora with dark purple discoloration and swelling. Lesion is about 1 cm and within one cm of clitoris.

## 2019-10-31 NOTE — LETTER BODY
[FreeTextEntry2] : Name: GARIMA ADAMS \par : Mar 15 1935 \par \par Date of Visit: Oct 31, 2019 \par \par Dear Dr. Mtz\par \par I recently saw our mutual patient, GARIMA ADAMS , in my office.\par \par Below, please see my findings.\par \par As always, it is my sincere pleasure to have the opportunity to participate in one of your patients' care. Please feel free to contact me with any questions or concerns that you may have regarding her care.\par \par Sincerely yours,\par \par Marine Abraham MD\par

## 2019-10-31 NOTE — CHIEF COMPLAINT
[FreeTextEntry1] : [facility]\par \par [facility] [phone] at [address] [place] [id number]\par

## 2019-10-31 NOTE — ASSESSMENT
[FreeTextEntry1] : 83yo with vulvar lesion.\par \par I discussed at length with the patient the risks, benefits, and alternatives to wide local excision of vulvar lesion, possible partial vulvectomy.  She understands the risks to include (but not be limited to): infection with need for hospitalization and bleeding with need for transfusion.The patient agrees to proceed. \par \par

## 2019-11-19 ENCOUNTER — APPOINTMENT (OUTPATIENT)
Dept: GYNECOLOGIC ONCOLOGY | Facility: CLINIC | Age: 84
End: 2019-11-19
Payer: MEDICARE

## 2019-11-19 VITALS — DIASTOLIC BLOOD PRESSURE: 87 MMHG | SYSTOLIC BLOOD PRESSURE: 157 MMHG

## 2019-11-19 VITALS
SYSTOLIC BLOOD PRESSURE: 167 MMHG | HEIGHT: 64.5 IN | WEIGHT: 127 LBS | BODY MASS INDEX: 21.42 KG/M2 | DIASTOLIC BLOOD PRESSURE: 82 MMHG

## 2019-11-19 DIAGNOSIS — Z80.8 FAMILY HISTORY OF MALIGNANT NEOPLASM OF OTHER ORGANS OR SYSTEMS: ICD-10-CM

## 2019-11-19 PROCEDURE — 99204 OFFICE O/P NEW MOD 45 MIN: CPT

## 2019-11-19 RX ORDER — NITROFURANTOIN (MONOHYDRATE/MACROCRYSTALS) 25; 75 MG/1; MG/1
100 CAPSULE ORAL
Qty: 6 | Refills: 0 | Status: COMPLETED | COMMUNITY
Start: 2019-08-07 | End: 2019-11-19

## 2019-11-19 RX ORDER — ESTRADIOL 0.1 MG/G
0.1 CREAM VAGINAL
Qty: 1 | Refills: 3 | Status: COMPLETED | COMMUNITY
Start: 2018-05-08 | End: 2019-11-19

## 2019-11-19 RX ORDER — OMEPRAZOLE 20 MG/1
20 TABLET, DELAYED RELEASE ORAL
Qty: 30 | Refills: 2 | Status: COMPLETED | COMMUNITY
Start: 2017-01-10 | End: 2019-11-19

## 2019-11-19 RX ORDER — ACETAMINOPHEN, GUAIFENESIN, AND PHENYLEPHRINE HYDROCHLORIDE 325; 200; 5 MG/1; MG/1; MG/1
5-325-200 TABLET, COATED ORAL
Qty: 30 | Refills: 0 | Status: COMPLETED | COMMUNITY
Start: 2019-01-08 | End: 2019-11-19

## 2019-11-19 RX ORDER — NITROFURANTOIN MACROCRYSTALS 100 MG/1
100 CAPSULE ORAL TWICE DAILY
Qty: 10 | Refills: 0 | Status: COMPLETED | COMMUNITY
Start: 2019-09-26 | End: 2019-11-19

## 2019-11-21 ENCOUNTER — OTHER (OUTPATIENT)
Age: 84
End: 2019-11-21

## 2019-11-22 ENCOUNTER — OTHER (OUTPATIENT)
Age: 84
End: 2019-11-22

## 2019-12-08 ENCOUNTER — FORM ENCOUNTER (OUTPATIENT)
Age: 84
End: 2019-12-08

## 2019-12-09 ENCOUNTER — OUTPATIENT (OUTPATIENT)
Dept: OUTPATIENT SERVICES | Facility: HOSPITAL | Age: 84
LOS: 1 days | End: 2019-12-09

## 2019-12-09 VITALS
TEMPERATURE: 99 F | WEIGHT: 128.97 LBS | HEIGHT: 64 IN | DIASTOLIC BLOOD PRESSURE: 84 MMHG | HEART RATE: 76 BPM | OXYGEN SATURATION: 97 % | RESPIRATION RATE: 14 BRPM | SYSTOLIC BLOOD PRESSURE: 130 MMHG

## 2019-12-09 DIAGNOSIS — Z90.710 ACQUIRED ABSENCE OF BOTH CERVIX AND UTERUS: Chronic | ICD-10-CM

## 2019-12-09 DIAGNOSIS — Z90.89 ACQUIRED ABSENCE OF OTHER ORGANS: Chronic | ICD-10-CM

## 2019-12-09 DIAGNOSIS — Z98.890 OTHER SPECIFIED POSTPROCEDURAL STATES: Chronic | ICD-10-CM

## 2019-12-09 DIAGNOSIS — Z87.81 PERSONAL HISTORY OF (HEALED) TRAUMATIC FRACTURE: Chronic | ICD-10-CM

## 2019-12-09 DIAGNOSIS — N89.0 MILD VAGINAL DYSPLASIA: ICD-10-CM

## 2019-12-09 DIAGNOSIS — Z95.2 PRESENCE OF PROSTHETIC HEART VALVE: Chronic | ICD-10-CM

## 2019-12-09 DIAGNOSIS — Z90.49 ACQUIRED ABSENCE OF OTHER SPECIFIED PARTS OF DIGESTIVE TRACT: Chronic | ICD-10-CM

## 2019-12-09 DIAGNOSIS — N89.8 OTHER SPECIFIED NONINFLAMMATORY DISORDERS OF VAGINA: ICD-10-CM

## 2019-12-09 DIAGNOSIS — Z95.2 PRESENCE OF PROSTHETIC HEART VALVE: ICD-10-CM

## 2019-12-09 DIAGNOSIS — Z01.818 ENCOUNTER FOR OTHER PREPROCEDURAL EXAMINATION: ICD-10-CM

## 2019-12-09 LAB
ALBUMIN SERPL ELPH-MCNC: 4.2 G/DL — SIGNIFICANT CHANGE UP (ref 3.3–5)
ALP SERPL-CCNC: 91 U/L — SIGNIFICANT CHANGE UP (ref 40–120)
ALT FLD-CCNC: < 5 U/L — SIGNIFICANT CHANGE UP (ref 4–33)
ANION GAP SERPL CALC-SCNC: 10 MMO/L — SIGNIFICANT CHANGE UP (ref 7–14)
AST SERPL-CCNC: 17 U/L — SIGNIFICANT CHANGE UP (ref 4–32)
BILIRUB SERPL-MCNC: 0.9 MG/DL — SIGNIFICANT CHANGE UP (ref 0.2–1.2)
BUN SERPL-MCNC: 17 MG/DL — SIGNIFICANT CHANGE UP (ref 7–23)
CALCIUM SERPL-MCNC: 9.3 MG/DL — SIGNIFICANT CHANGE UP (ref 8.4–10.5)
CHLORIDE SERPL-SCNC: 103 MMOL/L — SIGNIFICANT CHANGE UP (ref 98–107)
CO2 SERPL-SCNC: 29 MMOL/L — SIGNIFICANT CHANGE UP (ref 22–31)
CREAT SERPL-MCNC: 0.73 MG/DL — SIGNIFICANT CHANGE UP (ref 0.5–1.3)
GLUCOSE SERPL-MCNC: 84 MG/DL — SIGNIFICANT CHANGE UP (ref 70–99)
HCT VFR BLD CALC: 44.3 % — SIGNIFICANT CHANGE UP (ref 34.5–45)
HGB BLD-MCNC: 14.8 G/DL — SIGNIFICANT CHANGE UP (ref 11.5–15.5)
MCHC RBC-ENTMCNC: 31.8 PG — SIGNIFICANT CHANGE UP (ref 27–34)
MCHC RBC-ENTMCNC: 33.4 % — SIGNIFICANT CHANGE UP (ref 32–36)
MCV RBC AUTO: 95.3 FL — SIGNIFICANT CHANGE UP (ref 80–100)
NRBC # FLD: 0 K/UL — SIGNIFICANT CHANGE UP (ref 0–0)
PLATELET # BLD AUTO: 218 K/UL — SIGNIFICANT CHANGE UP (ref 150–400)
PMV BLD: 10.3 FL — SIGNIFICANT CHANGE UP (ref 7–13)
POTASSIUM SERPL-MCNC: 4.5 MMOL/L — SIGNIFICANT CHANGE UP (ref 3.5–5.3)
POTASSIUM SERPL-SCNC: 4.5 MMOL/L — SIGNIFICANT CHANGE UP (ref 3.5–5.3)
PROT SERPL-MCNC: 7.3 G/DL — SIGNIFICANT CHANGE UP (ref 6–8.3)
RBC # BLD: 4.65 M/UL — SIGNIFICANT CHANGE UP (ref 3.8–5.2)
RBC # FLD: 12.7 % — SIGNIFICANT CHANGE UP (ref 10.3–14.5)
SODIUM SERPL-SCNC: 142 MMOL/L — SIGNIFICANT CHANGE UP (ref 135–145)
WBC # BLD: 6.97 K/UL — SIGNIFICANT CHANGE UP (ref 3.8–10.5)
WBC # FLD AUTO: 6.97 K/UL — SIGNIFICANT CHANGE UP (ref 3.8–10.5)

## 2019-12-09 RX ORDER — SODIUM CHLORIDE 9 MG/ML
1000 INJECTION, SOLUTION INTRAVENOUS
Refills: 0 | Status: DISCONTINUED | OUTPATIENT
Start: 2019-12-13 | End: 2019-12-28

## 2019-12-09 NOTE — H&P PST ADULT - NSICDXPASTMEDICALHX_GEN_ALL_CORE_FT
PAST MEDICAL HISTORY:  Anxiety and depression     H/O irritable bowel syndrome     H/O mitral valve replacement     Other specified noninflammatory disorders of vagina     PAF (paroxysmal atrial fibrillation)     Parkinson disease     Squamous cell carcinoma of skin

## 2019-12-09 NOTE — H&P PST ADULT - PRIMARY CARE PROVIDER
Dr. Ruiz - [phone]         UroGYN- Dr. Carvalho,                            Neuro- Dr. Gant

## 2019-12-09 NOTE — H&P PST ADULT - NEGATIVE GENERAL GENITOURINARY SYMPTOMS
no flank pain L/no dysuria/no urinary hesitancy/no urine discoloration/normal urinary frequency/no flank pain R/no hematuria

## 2019-12-09 NOTE — H&P PST ADULT - ASSESSMENT
Preop diagnosis : Other noninflammatory disorders of vagina. Patient is scheduled for Wide local incision of vulva scheduled on 12/13/2019.

## 2019-12-09 NOTE — H&P PST ADULT - NEGATIVE OPHTHALMOLOGIC SYMPTOMS
no lacrimation R/no blurred vision R/no discharge R/no discharge L/no pain L/no irritation R/no blurred vision L/no pain R/no irritation L/no lacrimation L/no diplopia

## 2019-12-09 NOTE — H&P PST ADULT - NSICDXPROBLEM_GEN_ALL_CORE_FT
PROBLEM DIAGNOSES  Problem: Other specified noninflammatory disorders of vagina  Assessment and Plan:  Patient is scheduled for Wide local incision of vulva scheduled on 12/13/2019.   Preop instructions, pepcid provided. Pt stated understanding. Teach back method utilized.   Pending medical evaluation per  surgeon and PST- History of Parkinson's, Mitral Valve replacement.   Pending cardiology evaluation per sugroen and PST- History of Mitral valve replacement , history of Atrial Fibrillation.   Asprin plan: Last dose on 12/7/2019- per patient per cardiologist.   History of Parkinson's - Patient instructed to take Carvidopa - Levodopa in the AM of surgery with a sip of water.       Problem: H/O mitral valve replacement  Assessment and Plan: H/O mitral valve replacement 2012--[employer] [facility] [employer] -- Serial # [id number], Model # [id number], implant date: May 3, 2012  Implanted Physician : Italo Jones -[facility].  OR booking notified.

## 2019-12-09 NOTE — H&P PST ADULT - NEGATIVE ENMT SYMPTOMS
no sinus symptoms/no post-nasal discharge/no abnormal taste sensation/no hearing difficulty/no nasal congestion/no tinnitus/no nose bleeds/no recurrent cold sores/no ear pain/no nasal obstruction/no vertigo/no nasal discharge/no gum bleeding/no dry mouth/no throat pain

## 2019-12-09 NOTE — H&P PST ADULT - MUSCULOSKELETAL
details… detailed exam decreased ROM/diminished strength/no joint erythema/no calf tenderness/no joint swelling/Bilateral lower extremities/no joint warmth

## 2019-12-09 NOTE — H&P PST ADULT - NEGATIVE GASTROINTESTINAL SYMPTOMS
no change in bowel habits/no diarrhea/no melena/no nausea/no vomiting/no constipation/no abdominal pain

## 2019-12-09 NOTE — H&P PST ADULT - NSICDXPASTSURGICALHX_GEN_ALL_CORE_FT
PAST SURGICAL HISTORY:  H/O mitral valve replacement 2012    H/O resection of small bowel     H/O total hysterectomy     History of femur fracture Repaired in 6/ 2019    History of tonsillectomy and adenoidectomy PAST SURGICAL HISTORY:  H/O mitral valve replacement 2012--[employer] [facility] -- Serial # [id number], Model # [id number], implant date: May 3, 2012  Implanted Physician : Italo Jones -[facility].    H/O resection of small bowel     H/O total hysterectomy     History of femur fracture Repaired in 6/ 2019    History of tonsillectomy and adenoidectomy

## 2019-12-09 NOTE — H&P PST ADULT - HISTORY OF PRESENT ILLNESS
Patient is 84 year old female with a history of Vulva lesion has been present for approximately 1 year- per patient. " Over the last 3 months she has noted blood on the toilet paper, she believes the blood is coming from the lesion. She explains that her dermatologist referred her to her urologist who suggested she be seen by a GYN oncologist. " Patient was referred to Dr. Fountain for an evaluation. Preop diagnosis : Other noninflammatory disorders of vagina. Patient is scheduled for Wide local incision of vulva scheduled on 12/13/2019.

## 2019-12-09 NOTE — H&P PST ADULT - NSANTHOSAYNRD_GEN_A_CORE
No. ROSS screening performed.  STOP BANG Legend: 0-2 = LOW Risk; 3-4 = INTERMEDIATE Risk; 5-8 = HIGH Risk

## 2019-12-09 NOTE — H&P PST ADULT - NEUROLOGICAL DETAILS
alert and oriented x 3/responds to pain/Bilateral lower extremities/responds to verbal commands/strength decreased

## 2019-12-09 NOTE — H&P PST ADULT - NEGATIVE NEUROLOGICAL SYMPTOMS
no vertigo/no paresthesias/no generalized seizures/no syncope/no headache/no transient paralysis/no focal seizures/no confusion

## 2019-12-09 NOTE — H&P PST ADULT - RS GEN PE MLT RESP DETAILS PC
no rales/no rhonchi/no wheezes/good air movement/respirations non-labored/airway patent/breath sounds equal/clear to auscultation bilaterally

## 2019-12-12 ENCOUNTER — TRANSCRIPTION ENCOUNTER (OUTPATIENT)
Age: 84
End: 2019-12-12

## 2019-12-13 ENCOUNTER — OUTPATIENT (OUTPATIENT)
Dept: OUTPATIENT SERVICES | Facility: HOSPITAL | Age: 84
LOS: 1 days | Discharge: HOME | End: 2019-12-13
Payer: MEDICARE

## 2019-12-13 ENCOUNTER — APPOINTMENT (OUTPATIENT)
Dept: GYNECOLOGIC ONCOLOGY | Facility: HOSPITAL | Age: 84
End: 2019-12-13

## 2019-12-13 ENCOUNTER — RESULT REVIEW (OUTPATIENT)
Age: 84
End: 2019-12-13

## 2019-12-13 VITALS
HEART RATE: 67 BPM | RESPIRATION RATE: 18 BRPM | TEMPERATURE: 98 F | SYSTOLIC BLOOD PRESSURE: 115 MMHG | OXYGEN SATURATION: 96 % | DIASTOLIC BLOOD PRESSURE: 55 MMHG

## 2019-12-13 VITALS
RESPIRATION RATE: 16 BRPM | TEMPERATURE: 98 F | HEART RATE: 70 BPM | SYSTOLIC BLOOD PRESSURE: 134 MMHG | DIASTOLIC BLOOD PRESSURE: 67 MMHG | WEIGHT: 128.97 LBS | HEIGHT: 64 IN | OXYGEN SATURATION: 96 %

## 2019-12-13 DIAGNOSIS — Z90.49 ACQUIRED ABSENCE OF OTHER SPECIFIED PARTS OF DIGESTIVE TRACT: Chronic | ICD-10-CM

## 2019-12-13 DIAGNOSIS — Z98.890 OTHER SPECIFIED POSTPROCEDURAL STATES: Chronic | ICD-10-CM

## 2019-12-13 DIAGNOSIS — Z95.2 PRESENCE OF PROSTHETIC HEART VALVE: Chronic | ICD-10-CM

## 2019-12-13 DIAGNOSIS — Z90.89 ACQUIRED ABSENCE OF OTHER ORGANS: Chronic | ICD-10-CM

## 2019-12-13 DIAGNOSIS — N89.0 MILD VAGINAL DYSPLASIA: ICD-10-CM

## 2019-12-13 DIAGNOSIS — Z87.81 PERSONAL HISTORY OF (HEALED) TRAUMATIC FRACTURE: Chronic | ICD-10-CM

## 2019-12-13 DIAGNOSIS — Z90.710 ACQUIRED ABSENCE OF BOTH CERVIX AND UTERUS: Chronic | ICD-10-CM

## 2019-12-13 PROCEDURE — 88342 IMHCHEM/IMCYTCHM 1ST ANTB: CPT | Mod: 26

## 2019-12-13 PROCEDURE — 88341 IMHCHEM/IMCYTCHM EA ADD ANTB: CPT | Mod: 26

## 2019-12-13 PROCEDURE — 56620 VULVECTOMY SIMPLE PARTIAL: CPT

## 2019-12-13 PROCEDURE — 88305 TISSUE EXAM BY PATHOLOGIST: CPT | Mod: 26

## 2019-12-13 PROCEDURE — 88365 INSITU HYBRIDIZATION (FISH): CPT | Mod: 26

## 2019-12-13 RX ORDER — LIDOCAINE 4 G/100G
1 CREAM TOPICAL
Refills: 0 | Status: DISCONTINUED | OUTPATIENT
Start: 2019-12-13 | End: 2019-12-28

## 2019-12-13 RX ORDER — LIDOCAINE 4 G/100G
1 CREAM TOPICAL
Qty: 0 | Refills: 0 | DISCHARGE
Start: 2019-12-13

## 2019-12-13 RX ORDER — CARBIDOPA AND LEVODOPA 25; 100 MG/1; MG/1
1 TABLET ORAL ONCE
Refills: 0 | Status: COMPLETED | OUTPATIENT
Start: 2019-12-13 | End: 2019-12-13

## 2019-12-13 RX ORDER — IBUPROFEN 200 MG
1 TABLET ORAL
Qty: 30 | Refills: 0
Start: 2019-12-13

## 2019-12-13 RX ORDER — ACETAMINOPHEN 500 MG
975 TABLET ORAL EVERY 6 HOURS
Refills: 0 | Status: DISCONTINUED | OUTPATIENT
Start: 2019-12-13 | End: 2019-12-28

## 2019-12-13 RX ORDER — ACETAMINOPHEN 500 MG
1 TABLET ORAL
Qty: 30 | Refills: 0
Start: 2019-12-13

## 2019-12-13 RX ORDER — SODIUM CHLORIDE 9 MG/ML
1000 INJECTION, SOLUTION INTRAVENOUS
Refills: 0 | Status: DISCONTINUED | OUTPATIENT
Start: 2019-12-13 | End: 2019-12-28

## 2019-12-13 RX ORDER — IBUPROFEN 200 MG
600 TABLET ORAL EVERY 6 HOURS
Refills: 0 | Status: DISCONTINUED | OUTPATIENT
Start: 2019-12-13 | End: 2019-12-28

## 2019-12-13 RX ADMIN — CARBIDOPA AND LEVODOPA 1 TABLET(S): 25; 100 TABLET ORAL at 17:08

## 2019-12-13 NOTE — ASU DISCHARGE PLAN (ADULT/PEDIATRIC) - CALL YOUR DOCTOR IF YOU HAVE ANY OF THE FOLLOWING:
Nausea and vomiting that does not stop/Unable to urinate/Pain not relieved by Medications/Inability to tolerate liquids or foods/Excessive diarrhea/Increased irritability or sluggishness/Bleeding that does not stop/Fever greater than (need to indicate Fahrenheit or Celsius)

## 2019-12-13 NOTE — BRIEF OPERATIVE NOTE - SPECIMENS
Left labia minora excisional biopsy, Right labia minora excisional biopsy, left posterior perineal skin biopsy

## 2019-12-13 NOTE — BRIEF OPERATIVE NOTE - OPERATION/FINDINGS
Atrophic vagina on EUA. Acetic acid applied over the vulva and vagina to demarcate dysplastic lesions. Hemorrhagic/bruised appearing, mobile 1x1cm lesion on anterior left labia minora, excised and sent for biopsy. Similar appearing, smaller subcentimeter lesion on right labia minora, excised and sent for biopsy. Purpuric skin on left posterior perineum also biopsied.

## 2019-12-13 NOTE — BRIEF OPERATIVE NOTE - NSICDXBRIEFPROCEDURE_GEN_ALL_CORE_FT
PROCEDURES:  Excisional biopsy of lesion of labia, groin, or perineum in adult 13-Dec-2019 13:12:00  Balaji Moore

## 2019-12-13 NOTE — ASU DISCHARGE PLAN (ADULT/PEDIATRIC) - CARE PROVIDER_API CALL
Coby Fountain)  Gynecologic Oncology; Obstetrics and Gynecology  [address] [place] [address]  Phone: [phone]  Fax: [phone]  Follow Up Time:

## 2019-12-13 NOTE — ASU PREOP CHECKLIST - ANTIBIOTIC
Progress Notes by Elisha Rodas MD at 11/13/17 11:03 AM     Author:  Elisha Rodas MD Service:  (none) Author Type:  Physician     Filed:  11/13/17 11:03 AM Encounter Date:  11/13/2017 Status:  Signed     :  Elisha Rodas MD (Physician)            Belinda is 66 year old female who presents for a persistent cough lasting[PD1.1T] 3 weeks[PD1.1M].  The cough is described as[PD1.1T] congested and deep[PD1.1M].  Patient denies shortness of breath or chest pains.  No hemoptysis or wheeze.  Patient denies any history or night sweats or unexplained weight loss.  No recent travel, or sick contacts.  Has been taking over the counter cough and cold meds with no relief.    Review of Systems are otherwise negative  Non Smoker  Allergies Review of patient's allergies indicates no known allergies.    Current outpatient prescriptions prior to encounter       Medication  Sig Dispense Refill   • Multiple Vitamins-Minerals (ICAPS AREDS 2) CAPS Take  by mouth.     • Dextromethorphan HBr 15 MG CAPS Take one by mouth three times daily as needed for cough 60 Cap 1   • ipratropium (ATROVENT HFA) 17 MCG/ACT inhaler Inhale 2 Puffs by mouth every 6 (six) hours. 1 Inhaler 12   • ibandronate (BONIVA) 150 MG tablet Take in the morning with a full glass of water, on an empty stomach, do not lie down for the next 30 min. 3 Tab 3   • lovastatin (MEVACOR) 40 MG tablet Take 1 Tab by mouth daily with dinner. 90 Tab 3   • Ferrous Sulfate Dried (SLOW RELEASE IRON) 45 MG TBCR Take  by mouth.     • Acetaminophen (TYLENOL OR) Take  by mouth.     • esomeprazole (NEXIUM) 20 MG Cap Take 1 Cap by mouth every morning before breakfast.  0   • Calcium Carb-Cholecalciferol (CALCIUM + D3) 600-200 MG-UNIT TABS Take 1 Tab by mouth daily.           Patient Active Problem List    Diagnosis    • HLD (hyperlipidemia)   • Sciatica   • Degeneration of lumbar intervertebral disc   • Chronic cough   • Chronic nonallergic rhinitis   • Esophageal reflux   • Decreased bone  density     Objective  /64  Pulse 99  Temp 98.7 °F (37.1 °C) (Tympanic)   Resp 18  SpO2 100%  Alert, no acute distress, non toxic in appearance.  Cranial Nerves 2-12 intact  TMs- clear  Oropharynx- moist mucus membranes, airways patent  Trachea midline - nontender, no stridor  CVS - S1 S2 present , RRR no rubs murmurs or gallops  Lungs -coarse rhonchi, good air entry to bases bilaterall, no dullness to percussion or tactile fremitus  Abdomen - benign  Lower extremities - no clubbing, cyanosis or edema    Una sign negative      Assesement/Plan[PD1.1T]  sino[PD1.1M]bronchitis    Discussed this with the patient.  Recommend symptomatic otc measures  Current Outpatient Prescriptions    Medication    • cefUROXime (CEFTIN) 500 MG tablet   • predniSONE (DELTASONE) 20 MG tablet   • Multiple Vitamins-Minerals (ICAPS AREDS 2) CAPS   • Dextromethorphan HBr 15 MG CAPS   • ipratropium (ATROVENT HFA) 17 MCG/ACT inhaler   • ibandronate (BONIVA) 150 MG tablet   • lovastatin (MEVACOR) 40 MG tablet   • Ferrous Sulfate Dried (SLOW RELEASE IRON) 45 MG TBCR   • Acetaminophen (TYLENOL OR)   • esomeprazole (NEXIUM) 20 MG Cap   • Calcium Carb-Cholecalciferol (CALCIUM + D3) 600-200 MG-UNIT TABS       Discharged in a stable condition.   Side effects of all medications were discussed.  Patient is instructed to follow up in 2-3 days or as needed.  Patient is to go to the emergency room for any significant change or worsening.  Patient was discharged in a stable condition and was in agreement with the plan.  No further questions.    Electronically Signed by:    Elisha Rodas MD , 11/13/2017[PD1.1T]          Revision History        User Key Date/Time User Provider Type Action    > PD1.1 11/13/17 11:03 AM Elisha Rodas MD Physician Sign    M - Manual, T - Template             n/a

## 2019-12-13 NOTE — ASU DISCHARGE PLAN (ADULT/PEDIATRIC) - ACTIVITY LEVEL
No douching/Nothing per rectum/Nothing per vagina/No tub baths/No intercourse/No tampons/for 2 weeks

## 2019-12-13 NOTE — ASU DISCHARGE PLAN (ADULT/PEDIATRIC) - PROCEDURE
Excisional biopsy of left anterior labia Excisional biopsy of left labia minora, right labia minora, and left posterior perineum

## 2019-12-13 NOTE — ASU PREOP CHECKLIST - NEEDLE GAUGE
Thank you for your referral.  JONATHON Lopez, PA-C  Orthopedic Surgery, Upper Extremity Ext.[phone]  [phone]

## 2019-12-13 NOTE — ASU DISCHARGE PLAN (ADULT/PEDIATRIC) - ASU DC SPECIAL INSTRUCTIONSFT
Regular diet as tolerated, regular activity as tolerated, no heavy lifting for first two weeks.  Nothing per vagina: no intercourse, tampons or douching.  Call your provider if you experience fevers, chills, worsening abdominal pain, inability to urinate or worsening vaginal bleeding.    Follow up with your provider in 2 weeks. Apply lidocaine cream to the affected area 1-2 times a day. You can take Tylenol, Motrin for pain control. Nothing per vagina: no intercourse, tampons or douching for 2 weeks. Call your provider if you experience fevers, chills, worsening abdominal pain, inability to urinate or worsening vaginal bleeding.    Follow up with your provider in 2 weeks.

## 2019-12-13 NOTE — ASU DISCHARGE PLAN (ADULT/PEDIATRIC) - NURSING INSTRUCTIONS
You were given 1000mg IV Tylenol for pain management.  Please DO NOT take any Tylenol containing products, such as  Vicodin, Percocet, Excedrin, many cold preparations for the next 6 hours (until ___630PM____ ).  DO NOT EXCEED 3000MG OF TYLENOL OVER 24 HOURS.

## 2019-12-31 ENCOUNTER — APPOINTMENT (OUTPATIENT)
Dept: GYNECOLOGIC ONCOLOGY | Facility: CLINIC | Age: 84
End: 2019-12-31

## 2019-12-31 ENCOUNTER — APPOINTMENT (OUTPATIENT)
Dept: GYNECOLOGIC ONCOLOGY | Facility: CLINIC | Age: 84
End: 2019-12-31
Payer: MEDICARE

## 2019-12-31 VITALS — DIASTOLIC BLOOD PRESSURE: 70 MMHG | SYSTOLIC BLOOD PRESSURE: 108 MMHG | HEART RATE: 76 BPM

## 2019-12-31 PROBLEM — G20 PARKINSON'S DISEASE: Chronic | Status: ACTIVE | Noted: 2019-12-09

## 2019-12-31 PROBLEM — C44.92 SQUAMOUS CELL CARCINOMA OF SKIN, UNSPECIFIED: Chronic | Status: ACTIVE | Noted: 2019-12-09

## 2019-12-31 PROBLEM — Z87.19 PERSONAL HISTORY OF OTHER DISEASES OF THE DIGESTIVE SYSTEM: Chronic | Status: ACTIVE | Noted: 2019-12-09

## 2019-12-31 PROBLEM — F41.9 ANXIETY DISORDER, UNSPECIFIED: Chronic | Status: ACTIVE | Noted: 2019-12-09

## 2019-12-31 PROBLEM — N89.8 OTHER SPECIFIED NONINFLAMMATORY DISORDERS OF VAGINA: Chronic | Status: ACTIVE | Noted: 2019-12-09

## 2019-12-31 PROCEDURE — 99024 POSTOP FOLLOW-UP VISIT: CPT

## 2019-12-31 NOTE — REASON FOR VISIT
[Post Op] : post op visit [de-identified] : 12/13/19 [de-identified] : Feels well. No complaints [de-identified] : WLE of the vulva

## 2019-12-31 NOTE — DISCUSSION/SUMMARY
[de-identified] : Path: Pending - contacted pathology department - waiting for dermatopathology evaluation

## 2020-01-22 ENCOUNTER — APPOINTMENT (OUTPATIENT)
Dept: UROGYNECOLOGY | Facility: CLINIC | Age: 85
End: 2020-01-22
Payer: MEDICARE

## 2020-01-22 PROBLEM — K21.9 GASTRO-ESOPHAGEAL REFLUX DISEASE WITHOUT ESOPHAGITIS: Chronic | Status: ACTIVE | Noted: 2019-06-07

## 2020-01-22 PROBLEM — N39.3 STRESS INCONTINENCE (FEMALE) (MALE): Chronic | Status: ACTIVE | Noted: 2019-06-07

## 2020-01-22 LAB
BILIRUB UR QL STRIP: NEGATIVE
BILIRUB UR QL STRIP: NEGATIVE
CLARITY UR: CLEAR
CLARITY UR: CLEAR
COLLECTION METHOD: NORMAL
COLLECTION METHOD: NORMAL
GLUCOSE UR-MCNC: NEGATIVE
GLUCOSE UR-MCNC: NEGATIVE
HCG UR QL: 0.2 EU/DL
HCG UR QL: 0.2 EU/DL
HGB UR QL STRIP.AUTO: NEGATIVE
HGB UR QL STRIP.AUTO: NEGATIVE
KETONES UR-MCNC: NORMAL
KETONES UR-MCNC: NORMAL
LEUKOCYTE ESTERASE UR QL STRIP: NEGATIVE
LEUKOCYTE ESTERASE UR QL STRIP: NORMAL
NITRITE UR QL STRIP: NEGATIVE
NITRITE UR QL STRIP: NEGATIVE
PH UR STRIP: 5.5
PH UR STRIP: 5.5
PROT UR STRIP-MCNC: NEGATIVE
PROT UR STRIP-MCNC: NORMAL
SP GR UR STRIP: 1.02
SP GR UR STRIP: 1.02

## 2020-01-22 PROCEDURE — 81003 URINALYSIS AUTO W/O SCOPE: CPT | Mod: QW

## 2020-01-22 PROCEDURE — 52287 CYSTOSCOPY CHEMODENERVATION: CPT

## 2020-02-04 ENCOUNTER — APPOINTMENT (OUTPATIENT)
Dept: UROGYNECOLOGY | Facility: CLINIC | Age: 85
End: 2020-02-04

## 2020-02-19 ENCOUNTER — APPOINTMENT (OUTPATIENT)
Dept: UROGYNECOLOGY | Facility: CLINIC | Age: 85
End: 2020-02-19

## 2020-02-21 ENCOUNTER — APPOINTMENT (OUTPATIENT)
Dept: UROGYNECOLOGY | Facility: CLINIC | Age: 85
End: 2020-02-21
Payer: MEDICARE

## 2020-02-21 VITALS — SYSTOLIC BLOOD PRESSURE: 134 MMHG | DIASTOLIC BLOOD PRESSURE: 84 MMHG

## 2020-02-21 LAB
BILIRUB UR QL STRIP: NEGATIVE
CLARITY UR: CLEAR
COLLECTION METHOD: NORMAL
GLUCOSE UR-MCNC: NEGATIVE
HCG UR QL: 0.2 EU/DL
HGB UR QL STRIP.AUTO: NEGATIVE
KETONES UR-MCNC: NEGATIVE
LEUKOCYTE ESTERASE UR QL STRIP: NEGATIVE
NITRITE UR QL STRIP: NEGATIVE
PH UR STRIP: 6
PROT UR STRIP-MCNC: NEGATIVE
SP GR UR STRIP: 1.02

## 2020-02-21 PROCEDURE — 99213 OFFICE O/P EST LOW 20 MIN: CPT | Mod: 25

## 2020-02-21 PROCEDURE — 51701 INSERT BLADDER CATHETER: CPT

## 2020-02-21 PROCEDURE — 51798 US URINE CAPACITY MEASURE: CPT

## 2020-02-21 PROCEDURE — 81003 URINALYSIS AUTO W/O SCOPE: CPT | Mod: QW

## 2020-02-21 RX ORDER — NITROFURANTOIN MACROCRYSTALS 100 MG/1
100 CAPSULE ORAL TWICE DAILY
Qty: 10 | Refills: 0 | Status: DISCONTINUED | COMMUNITY
Start: 2020-01-02 | End: 2020-02-21

## 2020-02-21 NOTE — PHYSICAL EXAM
[No Acute Distress] : in no acute distress [Oriented x3] : oriented to person, place, and time [Normal] : was normal [Vulvar Atrophy] : vulvar atrophy [No Bleeding] : there was no active vaginal bleeding [Tenderness] : ~T no ~M abdominal tenderness observed [Distended] : not distended [de-identified] : known LS with white areas of discoloration, no new lesion, no new mass

## 2020-02-21 NOTE — ASSESSMENT
[FreeTextEntry1] : 1 month s/p 100 units bladder botox injections in 1/2020, 2nd round (first was in 10/19). Happy with reduction in symtpoms, however  ml today confirmed with cath, dip neg. She is voiding freely, feels volumes are OK. She is unable to perform CIC. I feel at a level of 200 ml, I would not insert an indwelling Parrish cath as it has it's risks and SEs as well. However, I would like to follow her PVRs closely. She will RTO in 2-4 weeks for a repeat PVR check. She will RTO sooner or call me if any symptoms of worsening incomplete emptying, slower flow of urination, dysuria, hematuria. She has a good understanding of the botox risk of retention and incomplete emptying. All ques answered and strict precautions given. If cannot void, to ED or office immediately. Risks of long-term incomplete emptying or retnetion such as UTIs, hydronephrosis, pyelonephritis or urosepsis, and renal damage discussed and she understood. If PVRs elevated at next visit, consider renal tract sono. Lichen sclerosus continue clobetasol use - increase to daily due to lesion present on labia, not maintenance dosing now.\par \par Plan:\par [] PVR check in 2-4 weeks\par [] clobetasol daily to LS to vulva\par [] consider renal tract sono to eval hydro at next visit if PVR still 200 ml or over\par [] avoid constipation as she feels some need for valsalva or slower flow at times of impaction/constipation which is relieved after passage of BM\par \par

## 2020-02-21 NOTE — PROCEDURE
[Patient] : the patient [Straight Catheterization] : insertion of a straight catheter [Urinary Retention] : urinary retention [___ Fr Straight Tip] : a [unfilled] in [place] straight tip catheter [No Complications] : no complications [None] : there were no complications with the catheter insertion [Clear] : clear [Post procedure instructions and information given] : Post procedure instructions and information were given and reviewed with patient. [0] : 0 [Tolerated Well] : the patient tolerated the procedure well [FreeTextEntry1] : cathed to confirm accuracy of bladder scan PVR -  ml

## 2020-02-21 NOTE — HISTORY OF PRESENT ILLNESS
[FreeTextEntry1] : CHAVEZ, Underwent 2nd round of 100 units of bladder botox injections 2 weeks ago on 1/22/20. (First was 10/2019). Feels improvement as she did the first time - reduced urge, can make it to bathroom in time without leakage, no incontinence. However, at times urine flow slower or requires valsalva to fully empty, however has urge to void and is able to void freely and almost completely vs completely most of the time. No hematuria, no dysuria, no fever/chills. Reports few days of groin pain after "adjustment" per her chiropracter, making it more difficult than usual to ambulate. Does not feel she has a UTI. Usling clobetasol about 1-2 times a week.

## 2020-03-31 ENCOUNTER — APPOINTMENT (OUTPATIENT)
Dept: UROGYNECOLOGY | Facility: CLINIC | Age: 85
End: 2020-03-31

## 2020-05-12 ENCOUNTER — APPOINTMENT (OUTPATIENT)
Dept: ORTHOPEDIC SURGERY | Facility: CLINIC | Age: 85
End: 2020-05-12

## 2020-05-15 ENCOUNTER — APPOINTMENT (OUTPATIENT)
Dept: UROGYNECOLOGY | Facility: CLINIC | Age: 85
End: 2020-05-15

## 2020-05-20 ENCOUNTER — APPOINTMENT (OUTPATIENT)
Dept: ORTHOPEDIC SURGERY | Facility: CLINIC | Age: 85
End: 2020-05-20
Payer: MEDICARE

## 2020-05-20 VITALS — TEMPERATURE: 97.7 F

## 2020-05-20 PROCEDURE — 72100 X-RAY EXAM L-S SPINE 2/3 VWS: CPT

## 2020-05-20 PROCEDURE — 73522 X-RAY EXAM HIPS BI 3-4 VIEWS: CPT

## 2020-05-20 PROCEDURE — 99214 OFFICE O/P EST MOD 30 MIN: CPT

## 2020-05-20 NOTE — PHYSICAL EXAM
[de-identified] : Constitutional - the patient is of normal build and nutrition.  She is in a wheelchair and she has some limitations because of her right hip and because of her Parkinson's disorder which makes her gait somewhat harder.  She does not have much as far as tremors.  The patient remains oriented to person, time, and  place.  Mood is normal. Vital signs as recorded.   \par The patient has satisfactory  balance and can stand on toes and heels.\par \par The patient has no difficulty with respiration. Respiration at rest is a normal rate. The patient is not short of breath and has not become short of breath with short ambulation. There is no audible wheezing. No coughing.\par \par Skin is normal for the patient's age.\par \par Spine - deep tendon reflexes are symmetric.  She appears to have satisfactory strength in her lower extremities and while sitting for is her ankles and knees.  It is symmetric.  Her sensation appears to be symmetric.  She does have a history however of significant degeneration in her lumbar spine and her back does give her pain INR intermittently\par \par \par UPPER EXTREMITIES \par \par Shoulders ROM  is symmetric  and the motion is satisfactory.  There is no significant shoulder pain or limitation in motion which would make using a cane or a walker difficult. Shoulder stability and  strength are satisfactory.\par \par Circulation appears satisfactory with pedal pulses present.  There is no major edema in the lower legs. No skin tenderness or increased temperature. No major varicosities.\par \par HIP EXAMINATION the abduction and abduction as well as rotation measurements were taken with the hip in flexion.\par \par Her left hip is satisfactory motion with flexion of 125 full extension abduction 70 adduction 20.  Her right hip is painful and she moves that she can flex to 100 degrees and then has discomfort she abducts to 45 degrees with some discomfort abducts 20 degrees and has full extension.  Good general alignment of her legs may show slight external rotation that she puts her right foot down she has also noticed this since the fracture repair.  No crepitus is felt in her hip at this time.\par \par \par \par KNEE EXAMINATION\par \par Motion\par Right Knee has 0 to 125 degrees of motion with good medial  lateral and anterior posterior stability.  There is no major effusion.  There is no Baker's cyst.  There is no significant patellofemoral crepitus.  The patient has satisfactory strength across the knee.               \par Left  Knee   has 0 to 125 degrees of motion with good medial lateral and anterior posterior stability.  There is no major effusion there is no Baker's cyst.  There is no significant patellofemoral crepitus.  The patient has satisfactory strength across the knee.            \par \par Ankle and foot examination\par Of the ankle has normal motion.  There is normal ankle stability.  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  On the x-rays taken today is hard to tell whether or not there is flattening of the superior femoral head but it appears as if there was on her previous x-rays.  This is possibly suggested on her lateral view.\par \par An AP and lateral x-ray of her lumbar spine show significant degeneration at all lumbar disc levels.  She has a curvature in the lumbar spine with a convexity right she has marked degeneration at all all lumbar levels.\par \par \par \par

## 2020-05-20 NOTE — DISCUSSION/SUMMARY
[de-identified] :  this patient does have this patient does have degeneration in her right hip but she is receiving an injection 2 weeks ago it would be best and safest to propel postpone surgery on his hip until about 3 months after the last shot also with the COVID epidemic present the hospital elective surgery is not fully open yet I think she will stay on conservative therapy at this time she does have the Parkinson's disorder and she does have there is pain in her hip after her fracture which probably represents arthritis interestingly and fears that the gamma nail may have been cemented however in the future the treatment would probably be removal of the nail and total hip replacement.  She will return in 2 months for follow-up.

## 2020-05-20 NOTE — HISTORY OF PRESENT ILLNESS
[de-identified] : Ms. GARIMA ADAMS is a 85 year female who presents to office complaining of right hip pain.\par Pain presents in the groin mainly, however, patient says this all began after she fell 1 year ago, sustaining a fracture to her right femur that required surgical fixation. She had this done at [facility] by Dr. Orlando Ram. She attributes this fall due to Parkinsons Disease, which was diagnosed about 5 years ago, and gives her gait disturbances.\par Dr. Marshall (pain management) gave her an intra-articular cortisone injection two times over the last 5 weeks, most recently done about 2 weeks ago.\par Patient has not received or had any other treatment performed for this pain.\par She received recent MRI of her lumbar spine and x-rays of her right femur at [facility].\par All review of systems, family history, social history, surgical history, past medical history, medications, and allergies not previously stated as positive are negative. They were reviewed by me today with the patient and documented accordingly.

## 2020-05-21 ENCOUNTER — APPOINTMENT (OUTPATIENT)
Dept: DERMATOLOGY | Facility: CLINIC | Age: 85
End: 2020-05-21

## 2020-05-22 ENCOUNTER — APPOINTMENT (OUTPATIENT)
Dept: UROGYNECOLOGY | Facility: CLINIC | Age: 85
End: 2020-05-22
Payer: MEDICARE

## 2020-05-22 VITALS — DIASTOLIC BLOOD PRESSURE: 78 MMHG | SYSTOLIC BLOOD PRESSURE: 128 MMHG

## 2020-05-22 PROCEDURE — 51798 US URINE CAPACITY MEASURE: CPT

## 2020-05-22 PROCEDURE — 99213 OFFICE O/P EST LOW 20 MIN: CPT | Mod: 25

## 2020-06-17 ENCOUNTER — APPOINTMENT (OUTPATIENT)
Dept: UROGYNECOLOGY | Facility: CLINIC | Age: 85
End: 2020-06-17
Payer: MEDICARE

## 2020-06-17 PROCEDURE — 52287 CYSTOSCOPY CHEMODENERVATION: CPT

## 2020-06-17 NOTE — HISTORY OF PRESENT ILLNESS
[FreeTextEntry1] : S/p 100 units bladder botox injections in 1/2020, 2nd round (first was in 10/19). 1st visit after injections in Feb, PVR was 200 ml with assocaited subj slower flow than normal and slight incomplete emptying. Since that time, she is voiding freely, feels volumes are OK. She is unable to perform CIC with Parkinson's. She was unable to come in again since due to COVID pandemic. In March she had hip steroid injection for pain, did not improve, undergoing ortho eval and needs right hip surgery, has pain but no infection. Today reports improvement in urinary flow, no incomplete emptying, no urgency and only small UUI in morning managed with a pad. Having hip surgery this summer when it can get scheduled. Continues clobetasol use about twice weekly, asymptomatic.\par \par Started nitrofurantoin periprocedure prophylaxis.

## 2020-06-17 NOTE — PHYSICAL EXAM
[No Acute Distress] : in no acute distress [Oriented x3] : oriented to person, place, and time [Post Void Residual ____ml] : post void residual was [unfilled] ml [de-identified] : bladder scan

## 2020-06-17 NOTE — ASSESSMENT
[FreeTextEntry1] : 4 months s/p bladder botox injections, PVR today normal, symptoms pretty well controlled. She will RTO sooner or call me if any symptoms such as incomplete emptying, slower flow of urination, dysuria, hematuria. If cannot void, to ED or office immediately. Risks of long-term incomplete emptying or retnetion such as UTIs, hydronephrosis, pyelonephritis or urosepsis, and renal damage discussed and she understood. Lichen sclerosus continue clobetasol use daily. She will RTO in few months after recovering from hip surgery, or sooner prn issues. For now, her urinary symptoms are controlled and she reports more pressing issues with the hip and medical clearance. All ques answered.\par \par Plan:\par [] clobetasol daily to LS to vulva\par [] avoid constipation as she feels some need for valsalva or slower flow at times of impaction/constipation which is relieved after passage of BM\par [] RTO for CHAVEZ management after recovering from hip surgery, for now control is pretty good after bladder botox injections done in Jan 2020\par

## 2020-06-30 ENCOUNTER — APPOINTMENT (OUTPATIENT)
Dept: UROGYNECOLOGY | Facility: CLINIC | Age: 85
End: 2020-06-30
Payer: MEDICARE

## 2020-06-30 PROCEDURE — 51798 US URINE CAPACITY MEASURE: CPT

## 2020-06-30 PROCEDURE — 99213 OFFICE O/P EST LOW 20 MIN: CPT | Mod: 25

## 2020-06-30 NOTE — HISTORY OF PRESENT ILLNESS
[FreeTextEntry1] : Underwent second round of 100 units bladder botox injections 6/17/20. Planning for hip surgery soon. Happy with results, UUI first void in morn only, good control throughout day otherwise, +urge to void, however sometimes needs little valsalva to empty, no incomplete emptying subjectively, no dysuria hematuria fever or chills.

## 2020-06-30 NOTE — ASSESSMENT
[FreeTextEntry1] : 2 weeks s/p 100 units bladder botox injections. Having hip surgery in August.  ml today, last time similar post-procedure and improved within 1-2 more weeks, precautiosn reviewed - if incopmlete emptying or cannot void, RTO or ED. Otherwise, f/u in 2 months or when recovering from hip surgery. She understood and agreed, all ques answrered. Bladder training and behav modifications reiterated. Continue LS clobetasol.\par \par Plan:\par [] continue clobetasol for LS\par [] RTO 2-3 months

## 2020-06-30 NOTE — PHYSICAL EXAM
[Chaperone Present] : A chaperone was present in the examining room during all aspects of the physical examination [No Acute Distress] : in no acute distress [Oriented x3] : oriented to person, place, and time [Post Void Residual ____ml] : post void residual was [unfilled] ml

## 2020-07-02 ENCOUNTER — APPOINTMENT (OUTPATIENT)
Dept: ORTHOPEDIC SURGERY | Facility: CLINIC | Age: 85
End: 2020-07-02
Payer: MEDICARE

## 2020-07-02 DIAGNOSIS — M16.11 UNILATERAL PRIMARY OSTEOARTHRITIS, RIGHT HIP: ICD-10-CM

## 2020-07-02 PROCEDURE — 99213 OFFICE O/P EST LOW 20 MIN: CPT | Mod: 95

## 2020-07-02 NOTE — REASON FOR VISIT
[Other Location: e.g. Home (Enter Location, City,State)___] : at [unfilled] [Home] : at home, [unfilled] , at the time of the visit. [Verbal consent obtained from patient] : the patient, [unfilled]

## 2020-07-02 NOTE — PHYSICAL EXAM
[de-identified] : \par HIP EXAMINATION the abduction and abduction as well as rotation measurements were taken with the hip in flexion.\par \par She says that her hip motion is not significantly changed since previously she still has the limitations that she has had before her hip is very painful to move. \par  Her previous motion showed her left hip is satisfactory motion with flexion of 125 full extension abduction 70 adduction 20.\par   Her right hip is painful and she moves that she can flex to 100 degrees and then has discomfort she abducts to 45 degrees with some discomfort abducts 20 degrees and has full extension.  She is still having this much pain and occasionally more but she is managing with her medications. [de-identified] : \par \par \par \par

## 2020-07-02 NOTE — DISCUSSION/SUMMARY
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  This will be done at [facility].  We will schedule it in approximately a month or so that time is gone by since her past cortisone injection.  She will be cleared by both her neurologist and her medical doctor.  She will be seeing her cardiologist soon.  She does not have an arrhythmia at this time is only on aspirin but had a mitral valve repair.  The discussion of the risk and benefits was had with the patient.  The natural history and treatment of degenerative arthritis was discussed with the patient at length today.  The spectrum of the treatment including nonoperative modalities to surgical intervention was elucidated.  Noninvasive and nonoperative treatment modalities include weight reduction, activity modification with low impact exercise, needed use of Tylenol or anti-inflammatory medications if tolerated, glucosamine and chondroitin supplement, and physical therapy.  Further treatment can include corticosteroid injections and the use of Visco supplementation with hyaluronic acid injections.  Definite surgical treatment can certainly include total joint arthroplasty also.  The risk and benefits of each treatment option was discussed and questions were answered.\par \par  A  long discussion was had with the patient as what the total joint replacement would entail.  A model was used as an educational tool to demonstrate the operation.  We did discuss implant choice and fixation, cemented or porous ingrowth, and stability concerns.  Shared decision making was made with the patient. The hospitalization and rehabilitation were discussed.  The uses of perioperative antibiotics and DVT prophylaxis were discussed.   The risks, benefits and alternatives to surgical intervention were discussed at length with the patient. Specific risks discussed included: infection, wound breakdown, numbness and damage to nerves, tendon, muscle, arteries or other blood vessels.  The possibility of recurrent pain, no improvement in pain and actual worsening of the pain were also mentioned in conversation with the patient. Medical complications related to the patient's general medical health including deep vein thrombosis, pulmonary embolus, heart attack, stroke, death and other complications from anesthesia were discussed as well. The patient was told that we will take steps to minimize these risks by using sterile technique, antibiotics and DVT prophylaxis when appropriate and following the patient postoperatively in the clinic setting to monitor progress. The benefits of surgery were discussed with the patient including the potential to improve the current clinical condition throughout operative intervention. Alternatives to surgical intervention include continued conservative management which may yield less than optimal results this particular patient. All questions were answered to the satisfaction of the patient.\par \par

## 2020-07-02 NOTE — HISTORY OF PRESENT ILLNESS
[de-identified] : Ms. GARIMA ADAMS is a 85 year female who presents to office complaining of right hip pain.\par Pain presents in the groin mainly, however, patient says this all began after she fell 1 year ago, sustaining a fracture to her right femur that required surgical fixation. She had this done at [facility] by Dr. Orlando Ram. She attributes this fall due to Parkinsons Disease, which was diagnosed about 5 years ago, and gives her gait disturbances.\par She received a cortisone injection done elsewhere.  This was a few weeks before our last visit.  Since that time she has had even more pain in her right hip and difficulty with ambulation but has been managing she would like to definitely now proceed with scheduling a revision to a right total hip replacement.  She will be seen again by her cardiologist where she has a history of previously having a mitral valve repair which corrected her atrial fibrillation she is only on aspirin.  She also will be seeing her neurologist for a follow-up of her Parkinson's.  If they think that her medical condition is satisfactory for surgery she would like to then have a revision of her right hip to a total hip replacement.

## 2020-07-15 ENCOUNTER — APPOINTMENT (OUTPATIENT)
Dept: ORTHOPEDIC SURGERY | Facility: CLINIC | Age: 85
End: 2020-07-15
Payer: MEDICARE

## 2020-07-15 VITALS — HEIGHT: 64.5 IN | TEMPERATURE: 97.7 F | BODY MASS INDEX: 21.93 KG/M2 | WEIGHT: 130 LBS

## 2020-07-15 DIAGNOSIS — M16.11 UNILATERAL PRIMARY OSTEOARTHRITIS, RIGHT HIP: ICD-10-CM

## 2020-07-15 PROCEDURE — 99213 OFFICE O/P EST LOW 20 MIN: CPT

## 2020-07-15 NOTE — HISTORY OF PRESENT ILLNESS
[de-identified] : Ms. GARIMA ADAMS is a 85 year female who presents to office complaining of right hip pain.\par Pain presents in the groin mainly, however, patient says this all began after she fell 1 year ago, sustaining a fracture to her right femur that required surgical fixation. She had this done at [facility] by Dr. Orlando Ram. She attributes this fall due to Parkinsons Disease, which was diagnosed about 5 years ago, and gives her gait disturbances.\par Dr. Marshall (pain management) gave her an intra-articular cortisone injection two times in April/May. Since that time she has had even more pain in her right hip and difficulty with ambulation but has been managing she would like to definitely now proceed with scheduling a revision to a right total hip replacement. She will be seen again by her cardiologist where she has a history of previously having a mitral valve repair which corrected her atrial fibrillation she is only on aspirin. She also will be seeing her neurologist for a follow-up of her Parkinson's. If they think that her medical condition is satisfactory for surgery she would like to then have a revision of her right hip to a total hip replacement.

## 2020-07-15 NOTE — PHYSICAL EXAM
[de-identified] : \par HIP EXAMINATION the abduction and abduction as well as rotation measurements were taken with the hip in flexion.\par \par Is having significantly more more problem with her right hip.  It is difficult because she also does have Parkinson's disorder.  Her right hip is painful for any motion.  At this time she flexes to 105 degrees but it is with pain she has abduction to 50 degrees abduction 20 degrees but is painful rotation is painful.  She has come in now to discuss the total hip replacement.  She says that her hip motion is not significantly changed since previously she still has the limitations that she has had before her hip is very painful to move. \par

## 2020-07-15 NOTE — DISCUSSION/SUMMARY
[de-identified] : A long discussion was again was had with the patient regarding her hip she is very limited by what she is able to do at this time the Synthes TFN a nail with cement will be removed and total hip placed the total hip replacement may be cemented in his femoral component it may be porous depending on the findings in her hip at that time.  The acetabular shell will probably be porous.  The natural history and treatment of degenerative arthritis was discussed with the patient at length today.  The spectrum of the treatment including nonoperative modalities to surgical intervention was elucidated.  Noninvasive and nonoperative treatment modalities include weight reduction, activity modification with low impact exercise, needed use of Tylenol or anti-inflammatory medications if tolerated, glucosamine and chondroitin supplement, and physical therapy.  Further treatment can include corticosteroid injections and the use of Visco supplementation with hyaluronic acid injections.  Definite surgical treatment can certainly include total joint arthroplasty also.  The risk and benefits of each treatment option was discussed and questions were answered.\par \par  A  long discussion was had with the patient as what the total joint replacement would entail.  A model was used as an educational tool to demonstrate the operation.  We did discuss implant choice and fixation, cemented or porous ingrowth, and stability concerns.  Shared decision making was made with the patient. The hospitalization and rehabilitation were discussed.  The uses of perioperative antibiotics and DVT prophylaxis were discussed.   The risks, benefits and alternatives to surgical intervention were discussed at length with the patient. Specific risks discussed included: infection, wound breakdown, numbness and damage to nerves, tendon, muscle, arteries or other blood vessels.  The possibility of recurrent pain, no improvement in pain and actual worsening of the pain were also mentioned in conversation with the patient. Medical complications related to the patient's general medical health including deep vein thrombosis, pulmonary embolus, heart attack, stroke, death and other complications from anesthesia were discussed as well. The patient was told that we will take steps to minimize these risks by using sterile technique, antibiotics and DVT prophylaxis when appropriate and following the patient postoperatively in the clinic setting to monitor progress. The benefits of surgery were discussed with the patient including the potential to improve the current clinical condition throughout operative intervention. Alternatives to surgical intervention include continued conservative management which may yield less than optimal results this particular patient. All questions were answered to the satisfaction of the patient.\par \par

## 2020-07-20 ENCOUNTER — APPOINTMENT (OUTPATIENT)
Dept: UROGYNECOLOGY | Facility: CLINIC | Age: 85
End: 2020-07-20
Payer: MEDICARE

## 2020-07-20 DIAGNOSIS — N39.0 URINARY TRACT INFECTION, SITE NOT SPECIFIED: ICD-10-CM

## 2020-07-20 PROCEDURE — 99213 OFFICE O/P EST LOW 20 MIN: CPT | Mod: 25

## 2020-07-20 PROCEDURE — 51702 INSERT TEMP BLADDER CATH: CPT

## 2020-07-20 NOTE — PROCEDURE
[Straight Catheterization] : insertion of a straight catheter [Patient] : the patient [Urinary Tract Infection] : a urinary tract infection [None] : there were no complications with the catheter insertion [___ Fr Straight Tip] : a [unfilled] in [place] straight tip catheter [Cloudy] : cloudy [Culture] : culture [No Complications] : no complications [Tolerated Well] : the patient tolerated the procedure well

## 2020-07-20 NOTE — HISTORY OF PRESENT ILLNESS
[FreeTextEntry1] : 86 y/o female arrived today states she has noticed her urine is cloudy , states it has been cloudy intermittent for past week. Denies frequency , dysuria , no hematuria. pt s/p Botox June 17,2020 with Dr. Carvalho 100 units injected . pt is overal happy with results , has good control now , in the mornings at times she has a issue overall doing well.  La is schedule for right hip surgery August 14, 2020 [facility] with  . PST scheduled for August 9th .

## 2020-07-20 NOTE — ASSESSMENT
[FreeTextEntry1] : 84 y/o female s/p botox now with cloudy urine otherwise no uti symptoms . pt also scheduled for surgery at [facility] next month. \par -urine culture \par -u/a\par -treat with appropiate antibiotics

## 2020-07-20 NOTE — DISCUSSION/SUMMARY
[FreeTextEntry1] : will call pt with results and treat appropriately pt understands and agree wilth plan

## 2020-07-22 ENCOUNTER — OUTPATIENT (OUTPATIENT)
Dept: OUTPATIENT SERVICES | Facility: HOSPITAL | Age: 85
LOS: 1 days | End: 2020-07-22
Payer: MEDICARE

## 2020-07-22 ENCOUNTER — APPOINTMENT (OUTPATIENT)
Dept: ULTRASOUND IMAGING | Facility: CLINIC | Age: 85
End: 2020-07-22
Payer: MEDICARE

## 2020-07-22 DIAGNOSIS — Z98.890 OTHER SPECIFIED POSTPROCEDURAL STATES: Chronic | ICD-10-CM

## 2020-07-22 DIAGNOSIS — Z90.49 ACQUIRED ABSENCE OF OTHER SPECIFIED PARTS OF DIGESTIVE TRACT: Chronic | ICD-10-CM

## 2020-07-22 DIAGNOSIS — Z90.710 ACQUIRED ABSENCE OF BOTH CERVIX AND UTERUS: Chronic | ICD-10-CM

## 2020-07-22 DIAGNOSIS — Z95.2 PRESENCE OF PROSTHETIC HEART VALVE: Chronic | ICD-10-CM

## 2020-07-22 DIAGNOSIS — Z00.00 ENCOUNTER FOR GENERAL ADULT MEDICAL EXAMINATION WITHOUT ABNORMAL FINDINGS: ICD-10-CM

## 2020-07-22 DIAGNOSIS — Z87.81 PERSONAL HISTORY OF (HEALED) TRAUMATIC FRACTURE: Chronic | ICD-10-CM

## 2020-07-22 DIAGNOSIS — Z90.89 ACQUIRED ABSENCE OF OTHER ORGANS: Chronic | ICD-10-CM

## 2020-07-22 LAB
APPEARANCE: ABNORMAL
BACTERIA: NEGATIVE
BILIRUBIN URINE: NEGATIVE
BLOOD URINE: NORMAL
COLOR: YELLOW
GLUCOSE QUALITATIVE U: NEGATIVE
HYALINE CASTS: 1 /LPF
KETONES URINE: NEGATIVE
LEUKOCYTE ESTERASE URINE: ABNORMAL
MICROSCOPIC-UA: NORMAL
NITRITE URINE: NEGATIVE
PH URINE: 6
PROTEIN URINE: NORMAL
RED BLOOD CELLS URINE: 3 /HPF
SPECIFIC GRAVITY URINE: 1.01
SQUAMOUS EPITHELIAL CELLS: 0 /HPF
UROBILINOGEN URINE: NORMAL
WHITE BLOOD CELLS URINE: 415 /HPF

## 2020-07-22 PROCEDURE — 93970 EXTREMITY STUDY: CPT

## 2020-07-22 PROCEDURE — 93970 EXTREMITY STUDY: CPT | Mod: 26

## 2020-07-27 LAB — BACTERIA UR CULT: ABNORMAL

## 2020-07-27 NOTE — ASU PATIENT PROFILE, ADULT - MEDICATION HERBAL REMEDIES, PROFILE
no Doxepin Pregnancy And Lactation Text: This medication is Pregnancy Category C and it isn't known if it is safe during pregnancy. It is also excreted in breast milk and breast feeding isn't recommended.

## 2020-08-06 DIAGNOSIS — Z01.818 ENCOUNTER FOR OTHER PREPROCEDURAL EXAMINATION: ICD-10-CM

## 2020-08-07 ENCOUNTER — OUTPATIENT (OUTPATIENT)
Dept: OUTPATIENT SERVICES | Facility: HOSPITAL | Age: 85
LOS: 1 days | End: 2020-08-07
Payer: MEDICARE

## 2020-08-07 VITALS — WEIGHT: 123.02 LBS | HEIGHT: 64 IN

## 2020-08-07 DIAGNOSIS — Z98.890 OTHER SPECIFIED POSTPROCEDURAL STATES: Chronic | ICD-10-CM

## 2020-08-07 DIAGNOSIS — M16.11 UNILATERAL PRIMARY OSTEOARTHRITIS, RIGHT HIP: ICD-10-CM

## 2020-08-07 DIAGNOSIS — Z90.710 ACQUIRED ABSENCE OF BOTH CERVIX AND UTERUS: Chronic | ICD-10-CM

## 2020-08-07 DIAGNOSIS — Z29.9 ENCOUNTER FOR PROPHYLACTIC MEASURES, UNSPECIFIED: ICD-10-CM

## 2020-08-07 DIAGNOSIS — Z01.818 ENCOUNTER FOR OTHER PREPROCEDURAL EXAMINATION: ICD-10-CM

## 2020-08-07 DIAGNOSIS — Z90.89 ACQUIRED ABSENCE OF OTHER ORGANS: Chronic | ICD-10-CM

## 2020-08-07 DIAGNOSIS — Z87.81 PERSONAL HISTORY OF (HEALED) TRAUMATIC FRACTURE: Chronic | ICD-10-CM

## 2020-08-07 DIAGNOSIS — Z90.49 ACQUIRED ABSENCE OF OTHER SPECIFIED PARTS OF DIGESTIVE TRACT: Chronic | ICD-10-CM

## 2020-08-07 DIAGNOSIS — Z95.2 PRESENCE OF PROSTHETIC HEART VALVE: Chronic | ICD-10-CM

## 2020-08-07 DIAGNOSIS — N89.8 OTHER SPECIFIED NONINFLAMMATORY DISORDERS OF VAGINA: Chronic | ICD-10-CM

## 2020-08-07 LAB
A1C WITH ESTIMATED AVERAGE GLUCOSE RESULT: 5.5 % — SIGNIFICANT CHANGE UP (ref 4–5.6)
ANION GAP SERPL CALC-SCNC: 10 MMOL/L — SIGNIFICANT CHANGE UP (ref 5–17)
BLD GP AB SCN SERPL QL: NEGATIVE — SIGNIFICANT CHANGE UP
BUN SERPL-MCNC: 23 MG/DL — SIGNIFICANT CHANGE UP (ref 7–23)
CALCIUM SERPL-MCNC: 9.4 MG/DL — SIGNIFICANT CHANGE UP (ref 8.4–10.5)
CHLORIDE SERPL-SCNC: 103 MMOL/L — SIGNIFICANT CHANGE UP (ref 96–108)
CO2 SERPL-SCNC: 27 MMOL/L — SIGNIFICANT CHANGE UP (ref 22–31)
CREAT SERPL-MCNC: 0.72 MG/DL — SIGNIFICANT CHANGE UP (ref 0.5–1.3)
ESTIMATED AVERAGE GLUCOSE: 111 MG/DL — SIGNIFICANT CHANGE UP (ref 68–114)
GLUCOSE SERPL-MCNC: 92 MG/DL — SIGNIFICANT CHANGE UP (ref 70–99)
HCT VFR BLD CALC: 44.4 % — SIGNIFICANT CHANGE UP (ref 34.5–45)
HGB BLD-MCNC: 14.2 G/DL — SIGNIFICANT CHANGE UP (ref 11.5–15.5)
MCHC RBC-ENTMCNC: 31.1 PG — SIGNIFICANT CHANGE UP (ref 27–34)
MCHC RBC-ENTMCNC: 32 GM/DL — SIGNIFICANT CHANGE UP (ref 32–36)
MCV RBC AUTO: 97.4 FL — SIGNIFICANT CHANGE UP (ref 80–100)
MRSA PCR RESULT.: SIGNIFICANT CHANGE UP
NRBC # BLD: 0 /100 WBCS — SIGNIFICANT CHANGE UP (ref 0–0)
PLATELET # BLD AUTO: 242 K/UL — SIGNIFICANT CHANGE UP (ref 150–400)
POTASSIUM SERPL-MCNC: 4.3 MMOL/L — SIGNIFICANT CHANGE UP (ref 3.5–5.3)
POTASSIUM SERPL-SCNC: 4.3 MMOL/L — SIGNIFICANT CHANGE UP (ref 3.5–5.3)
RBC # BLD: 4.56 M/UL — SIGNIFICANT CHANGE UP (ref 3.8–5.2)
RBC # FLD: 12.1 % — SIGNIFICANT CHANGE UP (ref 10.3–14.5)
RH IG SCN BLD-IMP: POSITIVE — SIGNIFICANT CHANGE UP
S AUREUS DNA NOSE QL NAA+PROBE: SIGNIFICANT CHANGE UP
SODIUM SERPL-SCNC: 140 MMOL/L — SIGNIFICANT CHANGE UP (ref 135–145)
WBC # BLD: 9.07 K/UL — SIGNIFICANT CHANGE UP (ref 3.8–10.5)
WBC # FLD AUTO: 9.07 K/UL — SIGNIFICANT CHANGE UP (ref 3.8–10.5)

## 2020-08-07 PROCEDURE — 83036 HEMOGLOBIN GLYCOSYLATED A1C: CPT

## 2020-08-07 PROCEDURE — 87641 MR-STAPH DNA AMP PROBE: CPT

## 2020-08-07 PROCEDURE — 87640 STAPH A DNA AMP PROBE: CPT

## 2020-08-07 PROCEDURE — 86901 BLOOD TYPING SEROLOGIC RH(D): CPT

## 2020-08-07 PROCEDURE — 80048 BASIC METABOLIC PNL TOTAL CA: CPT

## 2020-08-07 PROCEDURE — 86850 RBC ANTIBODY SCREEN: CPT

## 2020-08-07 PROCEDURE — G0463: CPT

## 2020-08-07 PROCEDURE — 85027 COMPLETE CBC AUTOMATED: CPT

## 2020-08-07 PROCEDURE — 86900 BLOOD TYPING SEROLOGIC ABO: CPT

## 2020-08-07 RX ORDER — METOPROLOL TARTRATE 50 MG
1 TABLET ORAL
Qty: 0 | Refills: 0 | DISCHARGE

## 2020-08-07 RX ORDER — CARBIDOPA AND LEVODOPA 25; 100 MG/1; MG/1
1 TABLET ORAL
Qty: 0 | Refills: 0 | DISCHARGE

## 2020-08-07 RX ORDER — ASPIRIN/CALCIUM CARB/MAGNESIUM 324 MG
1 TABLET ORAL
Qty: 0 | Refills: 0 | DISCHARGE

## 2020-08-07 RX ORDER — CEFAZOLIN SODIUM 1 G
2000 VIAL (EA) INJECTION ONCE
Refills: 0 | Status: COMPLETED | OUTPATIENT
Start: 2020-08-14 | End: 2020-08-14

## 2020-08-07 NOTE — H&P PST ADULT - NEGATIVE NEUROLOGICAL SYMPTOMS
no generalized seizures/no focal seizures/no transient paralysis/no paresthesias/no syncope/no vertigo/no headache/no confusion

## 2020-08-07 NOTE — H&P PST ADULT - NEGATIVE OPHTHALMOLOGIC SYMPTOMS
no pain R/no irritation L/no blurred vision R/no discharge R/no pain L/no lacrimation L/no diplopia/no blurred vision L/no irritation R/no lacrimation R/no discharge L

## 2020-08-07 NOTE — H&P PST ADULT - RS GEN PE MLT RESP DETAILS PC
respirations non-labored/good air movement/clear to auscultation bilaterally/no wheezes/breath sounds equal/no rales/no rhonchi/airway patent

## 2020-08-07 NOTE — H&P PST ADULT - HISTORY OF PRESENT ILLNESS
86 y/o female with PMH of HTN,  afib no AC on aspirin ,  MR  s/p MVR ( 2012),  Parkinson's disease . Pt has experiencing right hip pain. Pain presents in the groin mainly, however, patient says this all began after she fell 1 year ago, sustaining a fracture to her right femur that required surgical fixation it was done at [facility] by Dr. Orlando Ram. Pt was seen by ortho tried conservative management with no good effect . Presents to [facility] for scheduled Right Total Hip replacement on 8/14/20.    Covid test on 8/11/20   medical eval on 8/11/20  cardiology eval done 2 weeks ago - will call for all  reports and eval

## 2020-08-07 NOTE — H&P PST ADULT - MUSCULOSKELETAL
detailed exam no joint swelling/no joint erythema/diminished strength/Bilateral lower extremities/no joint warmth/no calf tenderness/decreased ROM details…

## 2020-08-07 NOTE — H&P PST ADULT - NSICDXPASTMEDICALHX_GEN_ALL_CORE_FT
PAST MEDICAL HISTORY:  Anxiety and depression     H/O irritable bowel syndrome     H/O mitral valve replacement     Other specified noninflammatory disorders of vagina     PAF (paroxysmal atrial fibrillation)     Parkinson disease     Squamous cell carcinoma of skin PAST MEDICAL HISTORY:  Anxiety and depression     Chronic GERD     H/O irritable bowel syndrome     H/O mitral valve replacement 2012    Other specified noninflammatory disorders of vagina     PAF (paroxysmal atrial fibrillation)     PAF (paroxysmal atrial fibrillation) no AC on aspirin    Parkinson disease followed by neurolohgist ,last week 2 weeks ago carotid doppler done - "normal"    Parkinson disease     Squamous cell carcinoma of skin     Stress incontinence

## 2020-08-07 NOTE — H&P PST ADULT - NSICDXFAMILYHX_GEN_ALL_CORE_FT
FAMILY HISTORY:  FH: heart disease, Mother FAMILY HISTORY:  FH: heart disease, Mother  FH: heart failure

## 2020-08-07 NOTE — H&P PST ADULT - NEGATIVE GASTROINTESTINAL SYMPTOMS
no abdominal pain/no change in bowel habits/no melena/no vomiting/no diarrhea/no nausea/no constipation

## 2020-08-07 NOTE — H&P PST ADULT - NEGATIVE ENMT SYMPTOMS
no tinnitus/no nasal congestion/no hearing difficulty/no nose bleeds/no recurrent cold sores/no dry mouth/no ear pain/no nasal discharge/no vertigo/no sinus symptoms/no nasal obstruction/no post-nasal discharge/no abnormal taste sensation/no gum bleeding/no throat pain

## 2020-08-07 NOTE — H&P PST ADULT - NSICDXPROBLEM_GEN_ALL_CORE_FT
PROBLEM DIAGNOSES  Problem: Other specified noninflammatory disorders of vagina  Assessment and Plan:  Patient is scheduled for Wide local incision of vulva scheduled on 12/13/2019.   Preop instructions, pepcid provided. Pt stated understanding. Teach back method utilized.   Pending medical evaluation per  surgeon and PST- History of Parkinson's, Mitral Valve replacement.   Pending cardiology evaluation per sugroen and PST- History of Mitral valve replacement , history of Atrial Fibrillation.   Asprin plan: Last dose on 12/7/2019- per patient per cardiologist.   History of Parkinson's - Patient instructed to take Carvidopa - Levodopa in the AM of surgery with a sip of water.       Problem: H/O mitral valve replacement  Assessment and Plan: H/O mitral valve replacement 2012--[employer] -- Serial # [id number], Model # M862-19O-88, implant date: May 3, 2012  Implanted Physician : Italo Jones -[facility].  OR booking notified. PROBLEM DIAGNOSES  Problem: Unilateral primary osteoarthritis, right hip  Assessment and Plan: Preop instructions. Pt stated understanding. Teach back method utilized.   Pending medical evaluation  8/11/20  Covid test on 8/11/20   Mitral Valve replacement.   Pending cardiology evaluation per tanisha and PST- History of Mitral valve replacement , history of Atrial Fibrillation.   Asprin plan: Last dose on 8/5/2019- per patient per cardiologist.   History of Parkinson's - Patient instructed to take Carvidopa - Levodopa in the AM of surgery with a sip of water.       Problem: Need for prophylactic measure  Assessment and Plan: The Caprini score indicates that this patient is at high risk for a VTE event (score 6 or greater). Surgical patients in this group will benefit from both pharmacologic prophylaxis and intermittent compression devices.  The surgical team will determine the balance between VTE risk and bleeding risk, and other clinical considerations

## 2020-08-07 NOTE — H&P PST ADULT - NEGATIVE GENERAL GENITOURINARY SYMPTOMS
no hematuria/no flank pain R/no flank pain L/no urine discoloration/no dysuria/normal urinary frequency/no urinary hesitancy

## 2020-08-07 NOTE — H&P PST ADULT - OTHER CARE PROVIDERS
Dr. Riley Tolbert - [phone]- cardiologist Dr. Riley Tolbert - [phone]- cardiologist 2 weeks ago

## 2020-08-07 NOTE — H&P PST ADULT - PRIMARY CARE PROVIDER
Dr. Ruiz - [phone]-PMD         UroGYN- Dr. Carvalho,                            Neuro- Dr. Gant Dr. Ruiz - [phone]-PMD   appointment 8/11/20      UroGYN- Dr. Carvalho,                            Neuro- Dr. Gant

## 2020-08-07 NOTE — H&P PST ADULT - HEMATOLOGY/LYMPHATICS
Essential hypertension    Glaucoma of both eyes, unspecified glaucoma type    Hemorrhoids, unspecified hemorrhoid type    Hyperlipidemia, unspecified hyperlipidemia type    Hypothyroidism, unspecified type    Malignant neoplasm of left female breast, unspecified estrogen receptor status, unspecified site of breast    Obesity    Osteoarthritis of both knees, unspecified osteoarthritis type    Pain in joint of right shoulder
details…

## 2020-08-07 NOTE — H&P PST ADULT - ASSESSMENT
Preop diagnosis : Other noninflammatory disorders of vagina. Patient is scheduled for Wide local incision of vulva scheduled on 12/13/2019. CAPRINI SCORE [CLOT updated 18]    AGE RELATED RISK FACTORS                                                       MOBILITY RELATED FACTORS  [ ] Age 41-60 years                                            (1 Point)                    [ ] Bed rest                                                        (1 Point)  [ ] Age: 61-74 years                                           (2 Points)                  [ ] Plaster cast                                                   (2 Points)  x[ ] Age= 75 years                                              (3 Points)                    [ ] Bed bound for more than 72 hours                 (2 Points)    DISEASE RELATED RISK FACTORS                                               GENDER SPECIFIC FACTORS  [x ] Edema in the lower extremities                       (1 Point)              [ ] Pregnancy                                                     (1 Point)  [ ] Varicose veins                                               (1 Point)                     [ ] Post-partum < 6 weeks                                   (1 Point)             [ ] BMI > 25 Kg/m2                                            (1 Point)                     [ ] Hormonal therapy  or oral contraception          (1 Point)                 [ ] Sepsis (in the previous month)                        (1 Point)               [ ] History of pregnancy complications                 (1 point)  [ ] Pneumonia or serious lung disease                                               [ ] Unexplained or recurrent                     (1 Point)           (in the previous month)                               (1 Point)  [ ] Abnormal pulmonary function test                     (1 Point)                 SURGERY RELATED RISK FACTORS  [ ] Acute myocardial infarction                              (1 Point)               [ ]  Section                                             (1 Point)  [ ] Congestive heart failure (in the previous month)  (1 Point)      [ ] Minor surgery                                                  (1 Point)   [ ] Inflammatory bowel disease                             (1 Point)               [ ] Arthroscopic surgery                                        (2 Points)  [ ] Central venous access                                      (2 Points)                [ ] General surgery lasting more than 45 minutes (2 points)  [ ] Present or previous malignancy                     (2 Points)                [x ] Elective arthroplasty                                         (5 points)    [ ] Stroke (in the previous month)                          (5 Points)                                                                                                                                                           HEMATOLOGY RELATED FACTORS                                                 TRAUMA RELATED RISK FACTORS  [ ] Prior episodes of VTE                                     (3 Points)                [ ] Fracture of the hip, pelvis, or leg                       (5 Points)  [ ] Positive family history for VTE                         (3 Points)             [ ] Acute spinal cord injury (in the previous month)  (5 Points)  [ ] Prothrombin 56054 A                                     (3 Points)               [ ] Paralysis  (less than 1 month)                             (5 Points)  [ ] Factor V Leiden                                             (3 Points)                  [ ] Multiple Trauma within 1 month                        (5 Points)  [ ] Lupus anticoagulants                                     (3 Points)                                                           [ ] Anticardiolipin antibodies                               (3 Points)                                                       [ ] High homocysteine in the blood                      (3 Points)                                             [ ] Other congenital or acquired thrombophilia      (3 Points)                                                [ ] Heparin induced thrombocytopenia                  (3 Points)                                     Total Score [     9     ]

## 2020-08-07 NOTE — H&P PST ADULT - NSICDXPASTSURGICALHX_GEN_ALL_CORE_FT
PAST SURGICAL HISTORY:  H/O mitral valve replacement 2012--[facility] -- Serial # [id number], Model # [id number], implant date: May 3, 2012  Implanted Physician : Italo Jones -[facility].    H/O resection of small bowel     H/O total hysterectomy     History of femur fracture Repaired in 6/ 2019    History of tonsillectomy and adenoidectomy PAST SURGICAL HISTORY:  H/O mitral valve replacement 2012--[facility] -- Serial # [id number], Model # [id number], implant date: May 3, 2012  Implanted Physician : Italo Jones -[facility].    H/O resection of small bowel     H/O total hysterectomy     History of femur fracture Repaired in 6/ 2019    History of tonsillectomy and adenoidectomy     Other specified noninflammatory disorder of vagina s/p excision of vaginal lesion- benign - 2019    S/P hysterectomy     S/P small bowel resection     S/P tonsillectomy

## 2020-08-11 ENCOUNTER — APPOINTMENT (OUTPATIENT)
Dept: DISASTER EMERGENCY | Facility: CLINIC | Age: 85
End: 2020-08-11

## 2020-08-12 LAB — SARS-COV-2 N GENE NPH QL NAA+PROBE: NOT DETECTED

## 2020-08-13 ENCOUNTER — TRANSCRIPTION ENCOUNTER (OUTPATIENT)
Age: 85
End: 2020-08-13

## 2020-08-14 ENCOUNTER — RESULT REVIEW (OUTPATIENT)
Age: 85
End: 2020-08-14

## 2020-08-14 ENCOUNTER — APPOINTMENT (OUTPATIENT)
Dept: ORTHOPEDIC SURGERY | Facility: HOSPITAL | Age: 85
End: 2020-08-14

## 2020-08-14 ENCOUNTER — INPATIENT (INPATIENT)
Facility: HOSPITAL | Age: 85
LOS: 3 days | Discharge: NURSING, REHAB OR LONG-TERM CARE | DRG: 470 | End: 2020-08-18
Attending: ORTHOPAEDIC SURGERY | Admitting: ORTHOPAEDIC SURGERY
Payer: MEDICARE

## 2020-08-14 VITALS
TEMPERATURE: 98 F | SYSTOLIC BLOOD PRESSURE: 145 MMHG | HEIGHT: 64 IN | DIASTOLIC BLOOD PRESSURE: 84 MMHG | HEART RATE: 71 BPM | RESPIRATION RATE: 16 BRPM | OXYGEN SATURATION: 99 % | WEIGHT: 123.02 LBS

## 2020-08-14 DIAGNOSIS — Z90.710 ACQUIRED ABSENCE OF BOTH CERVIX AND UTERUS: Chronic | ICD-10-CM

## 2020-08-14 DIAGNOSIS — M16.11 UNILATERAL PRIMARY OSTEOARTHRITIS, RIGHT HIP: ICD-10-CM

## 2020-08-14 DIAGNOSIS — Z90.49 ACQUIRED ABSENCE OF OTHER SPECIFIED PARTS OF DIGESTIVE TRACT: Chronic | ICD-10-CM

## 2020-08-14 DIAGNOSIS — Z90.89 ACQUIRED ABSENCE OF OTHER ORGANS: Chronic | ICD-10-CM

## 2020-08-14 DIAGNOSIS — Z98.890 OTHER SPECIFIED POSTPROCEDURAL STATES: Chronic | ICD-10-CM

## 2020-08-14 DIAGNOSIS — Z95.2 PRESENCE OF PROSTHETIC HEART VALVE: Chronic | ICD-10-CM

## 2020-08-14 DIAGNOSIS — Z87.81 PERSONAL HISTORY OF (HEALED) TRAUMATIC FRACTURE: Chronic | ICD-10-CM

## 2020-08-14 DIAGNOSIS — N89.8 OTHER SPECIFIED NONINFLAMMATORY DISORDERS OF VAGINA: Chronic | ICD-10-CM

## 2020-08-14 LAB
GLUCOSE BLDC GLUCOMTR-MCNC: 88 MG/DL — SIGNIFICANT CHANGE UP (ref 70–99)
HCT VFR BLD CALC: 35.3 % — SIGNIFICANT CHANGE UP (ref 34.5–45)
HGB BLD-MCNC: 11.3 G/DL — LOW (ref 11.5–15.5)
MCHC RBC-ENTMCNC: 31.5 PG — SIGNIFICANT CHANGE UP (ref 27–34)
MCHC RBC-ENTMCNC: 32 GM/DL — SIGNIFICANT CHANGE UP (ref 32–36)
MCV RBC AUTO: 98.3 FL — SIGNIFICANT CHANGE UP (ref 80–100)
NRBC # BLD: 0 /100 WBCS — SIGNIFICANT CHANGE UP (ref 0–0)
PLATELET # BLD AUTO: 215 K/UL — SIGNIFICANT CHANGE UP (ref 150–400)
RBC # BLD: 3.59 M/UL — LOW (ref 3.8–5.2)
RBC # FLD: 12.2 % — SIGNIFICANT CHANGE UP (ref 10.3–14.5)
RH IG SCN BLD-IMP: POSITIVE — SIGNIFICANT CHANGE UP
WBC # BLD: 16.4 K/UL — HIGH (ref 3.8–10.5)
WBC # FLD AUTO: 16.4 K/UL — HIGH (ref 3.8–10.5)

## 2020-08-14 PROCEDURE — 88305 TISSUE EXAM BY PATHOLOGIST: CPT | Mod: 26

## 2020-08-14 PROCEDURE — 88311 DECALCIFY TISSUE: CPT | Mod: 26

## 2020-08-14 PROCEDURE — 88300 SURGICAL PATH GROSS: CPT | Mod: 26,59

## 2020-08-14 PROCEDURE — 99291 CRITICAL CARE FIRST HOUR: CPT

## 2020-08-14 PROCEDURE — 27132 TOTAL HIP ARTHROPLASTY: CPT | Mod: RT

## 2020-08-14 PROCEDURE — 73501 X-RAY EXAM HIP UNI 1 VIEW: CPT | Mod: 26,RT

## 2020-08-14 PROCEDURE — 20680 REMOVAL OF IMPLANT DEEP: CPT | Mod: 59,RT

## 2020-08-14 RX ORDER — KETOROLAC TROMETHAMINE 30 MG/ML
15 SYRINGE (ML) INJECTION EVERY 8 HOURS
Refills: 0 | Status: DISCONTINUED | OUTPATIENT
Start: 2020-08-14 | End: 2020-08-14

## 2020-08-14 RX ORDER — GABAPENTIN 400 MG/1
100 CAPSULE ORAL ONCE
Refills: 0 | Status: COMPLETED | OUTPATIENT
Start: 2020-08-14 | End: 2020-08-14

## 2020-08-14 RX ORDER — ASPIRIN/CALCIUM CARB/MAGNESIUM 324 MG
325 TABLET ORAL
Refills: 0 | Status: DISCONTINUED | OUTPATIENT
Start: 2020-08-14 | End: 2020-08-18

## 2020-08-14 RX ORDER — PANTOPRAZOLE SODIUM 20 MG/1
40 TABLET, DELAYED RELEASE ORAL
Refills: 0 | Status: DISCONTINUED | OUTPATIENT
Start: 2020-08-14 | End: 2020-08-14

## 2020-08-14 RX ORDER — TRAMADOL HYDROCHLORIDE 50 MG/1
50 TABLET ORAL ONCE
Refills: 0 | Status: DISCONTINUED | OUTPATIENT
Start: 2020-08-14 | End: 2020-08-14

## 2020-08-14 RX ORDER — METOPROLOL TARTRATE 50 MG
25 TABLET ORAL DAILY
Refills: 0 | Status: DISCONTINUED | OUTPATIENT
Start: 2020-08-14 | End: 2020-08-14

## 2020-08-14 RX ORDER — ACETAMINOPHEN 500 MG
1000 TABLET ORAL EVERY 8 HOURS
Refills: 0 | Status: DISCONTINUED | OUTPATIENT
Start: 2020-08-14 | End: 2020-08-14

## 2020-08-14 RX ORDER — CHLORHEXIDINE GLUCONATE 213 G/1000ML
1 SOLUTION TOPICAL
Refills: 0 | Status: DISCONTINUED | OUTPATIENT
Start: 2020-08-15 | End: 2020-08-16

## 2020-08-14 RX ORDER — CELECOXIB 200 MG/1
200 CAPSULE ORAL ONCE
Refills: 0 | Status: COMPLETED | OUTPATIENT
Start: 2020-08-14 | End: 2020-08-14

## 2020-08-14 RX ORDER — SENNA PLUS 8.6 MG/1
2 TABLET ORAL AT BEDTIME
Refills: 0 | Status: DISCONTINUED | OUTPATIENT
Start: 2020-08-14 | End: 2020-08-14

## 2020-08-14 RX ORDER — CEFAZOLIN SODIUM 1 G
2000 VIAL (EA) INJECTION EVERY 8 HOURS
Refills: 0 | Status: COMPLETED | OUTPATIENT
Start: 2020-08-14 | End: 2020-08-15

## 2020-08-14 RX ORDER — FERROUS SULFATE 325(65) MG
325 TABLET ORAL
Refills: 0 | Status: DISCONTINUED | OUTPATIENT
Start: 2020-08-14 | End: 2020-08-14

## 2020-08-14 RX ORDER — SODIUM CHLORIDE 9 MG/ML
500 INJECTION, SOLUTION INTRAVENOUS ONCE
Refills: 0 | Status: COMPLETED | OUTPATIENT
Start: 2020-08-14 | End: 2020-08-14

## 2020-08-14 RX ORDER — KETOROLAC TROMETHAMINE 30 MG/ML
15 SYRINGE (ML) INJECTION EVERY 6 HOURS
Refills: 0 | Status: DISCONTINUED | OUTPATIENT
Start: 2020-08-15 | End: 2020-08-15

## 2020-08-14 RX ORDER — PANTOPRAZOLE SODIUM 20 MG/1
40 TABLET, DELAYED RELEASE ORAL ONCE
Refills: 0 | Status: COMPLETED | OUTPATIENT
Start: 2020-08-14 | End: 2020-08-14

## 2020-08-14 RX ORDER — OXYCODONE HYDROCHLORIDE 5 MG/1
2.5 TABLET ORAL EVERY 4 HOURS
Refills: 0 | Status: DISCONTINUED | OUTPATIENT
Start: 2020-08-14 | End: 2020-08-18

## 2020-08-14 RX ORDER — CARBIDOPA AND LEVODOPA 25; 100 MG/1; MG/1
1 TABLET ORAL
Refills: 0 | Status: DISCONTINUED | OUTPATIENT
Start: 2020-08-14 | End: 2020-08-15

## 2020-08-14 RX ORDER — ESCITALOPRAM OXALATE 10 MG/1
10 TABLET, FILM COATED ORAL DAILY
Refills: 0 | Status: DISCONTINUED | OUTPATIENT
Start: 2020-08-14 | End: 2020-08-14

## 2020-08-14 RX ORDER — OXYCODONE HYDROCHLORIDE 5 MG/1
5 TABLET ORAL EVERY 4 HOURS
Refills: 0 | Status: DISCONTINUED | OUTPATIENT
Start: 2020-08-14 | End: 2020-08-18

## 2020-08-14 RX ORDER — SODIUM CHLORIDE 9 MG/ML
500 INJECTION, SOLUTION INTRAVENOUS ONCE
Refills: 0 | Status: DISCONTINUED | OUTPATIENT
Start: 2020-08-14 | End: 2020-08-14

## 2020-08-14 RX ORDER — TRAMADOL HYDROCHLORIDE 50 MG/1
50 TABLET ORAL EVERY 6 HOURS
Refills: 0 | Status: DISCONTINUED | OUTPATIENT
Start: 2020-08-14 | End: 2020-08-14

## 2020-08-14 RX ORDER — PHENYLEPHRINE HYDROCHLORIDE 10 MG/ML
0.4 INJECTION INTRAVENOUS
Qty: 40 | Refills: 0 | Status: DISCONTINUED | OUTPATIENT
Start: 2020-08-14 | End: 2020-08-15

## 2020-08-14 RX ORDER — POLYETHYLENE GLYCOL 3350 17 G/17G
17 POWDER, FOR SOLUTION ORAL DAILY
Refills: 0 | Status: DISCONTINUED | OUTPATIENT
Start: 2020-08-14 | End: 2020-08-18

## 2020-08-14 RX ORDER — LIDOCAINE HCL 20 MG/ML
0.2 VIAL (ML) INJECTION ONCE
Refills: 0 | Status: DISCONTINUED | OUTPATIENT
Start: 2020-08-14 | End: 2020-08-14

## 2020-08-14 RX ORDER — ASCORBIC ACID 60 MG
500 TABLET,CHEWABLE ORAL
Refills: 0 | Status: DISCONTINUED | OUTPATIENT
Start: 2020-08-14 | End: 2020-08-14

## 2020-08-14 RX ORDER — FOLIC ACID 0.8 MG
1 TABLET ORAL DAILY
Refills: 0 | Status: DISCONTINUED | OUTPATIENT
Start: 2020-08-14 | End: 2020-08-14

## 2020-08-14 RX ORDER — SODIUM CHLORIDE 9 MG/ML
1000 INJECTION INTRAMUSCULAR; INTRAVENOUS; SUBCUTANEOUS
Refills: 0 | Status: DISCONTINUED | OUTPATIENT
Start: 2020-08-14 | End: 2020-08-14

## 2020-08-14 RX ORDER — DEXAMETHASONE 0.5 MG/5ML
8 ELIXIR ORAL ONCE
Refills: 0 | Status: DISCONTINUED | OUTPATIENT
Start: 2020-08-14 | End: 2020-08-14

## 2020-08-14 RX ORDER — PHENYLEPHRINE HYDROCHLORIDE 10 MG/ML
0.4 INJECTION INTRAVENOUS
Qty: 160 | Refills: 0 | Status: DISCONTINUED | OUTPATIENT
Start: 2020-08-14 | End: 2020-08-14

## 2020-08-14 RX ORDER — ONDANSETRON 8 MG/1
4 TABLET, FILM COATED ORAL EVERY 6 HOURS
Refills: 0 | Status: DISCONTINUED | OUTPATIENT
Start: 2020-08-14 | End: 2020-08-18

## 2020-08-14 RX ORDER — CARBIDOPA AND LEVODOPA 25; 100 MG/1; MG/1
1 TABLET ORAL
Refills: 0 | Status: DISCONTINUED | OUTPATIENT
Start: 2020-08-14 | End: 2020-08-14

## 2020-08-14 RX ORDER — SODIUM CHLORIDE 9 MG/ML
3 INJECTION INTRAMUSCULAR; INTRAVENOUS; SUBCUTANEOUS EVERY 8 HOURS
Refills: 0 | Status: DISCONTINUED | OUTPATIENT
Start: 2020-08-14 | End: 2020-08-14

## 2020-08-14 RX ORDER — CHLORHEXIDINE GLUCONATE 213 G/1000ML
1 SOLUTION TOPICAL ONCE
Refills: 0 | Status: COMPLETED | OUTPATIENT
Start: 2020-08-14 | End: 2020-08-14

## 2020-08-14 RX ORDER — OXYCODONE HYDROCHLORIDE 5 MG/1
10 TABLET ORAL EVERY 4 HOURS
Refills: 0 | Status: DISCONTINUED | OUTPATIENT
Start: 2020-08-14 | End: 2020-08-14

## 2020-08-14 RX ORDER — SODIUM CHLORIDE 9 MG/ML
1000 INJECTION INTRAMUSCULAR; INTRAVENOUS; SUBCUTANEOUS
Refills: 0 | Status: DISCONTINUED | OUTPATIENT
Start: 2020-08-14 | End: 2020-08-15

## 2020-08-14 RX ORDER — ACETAMINOPHEN 500 MG
975 TABLET ORAL EVERY 6 HOURS
Refills: 0 | Status: COMPLETED | OUTPATIENT
Start: 2020-08-15 | End: 2020-08-18

## 2020-08-14 RX ORDER — HYDROMORPHONE HYDROCHLORIDE 2 MG/ML
0.25 INJECTION INTRAMUSCULAR; INTRAVENOUS; SUBCUTANEOUS
Refills: 0 | Status: DISCONTINUED | OUTPATIENT
Start: 2020-08-14 | End: 2020-08-14

## 2020-08-14 RX ORDER — OXYCODONE HYDROCHLORIDE 5 MG/1
5 TABLET ORAL EVERY 4 HOURS
Refills: 0 | Status: DISCONTINUED | OUTPATIENT
Start: 2020-08-14 | End: 2020-08-14

## 2020-08-14 RX ORDER — DEXAMETHASONE 0.5 MG/5ML
8 ELIXIR ORAL ONCE
Refills: 0 | Status: COMPLETED | OUTPATIENT
Start: 2020-08-15 | End: 2020-08-15

## 2020-08-14 RX ORDER — ESCITALOPRAM OXALATE 10 MG/1
10 TABLET, FILM COATED ORAL DAILY
Refills: 0 | Status: DISCONTINUED | OUTPATIENT
Start: 2020-08-14 | End: 2020-08-18

## 2020-08-14 RX ADMIN — CARBIDOPA AND LEVODOPA 1 TABLET(S): 25; 100 TABLET ORAL at 23:01

## 2020-08-14 RX ADMIN — Medication 325 MILLIGRAM(S): at 18:44

## 2020-08-14 RX ADMIN — CARBIDOPA AND LEVODOPA 1 TABLET(S): 25; 100 TABLET ORAL at 18:43

## 2020-08-14 RX ADMIN — CELECOXIB 200 MILLIGRAM(S): 200 CAPSULE ORAL at 09:36

## 2020-08-14 RX ADMIN — Medication 500 MILLIGRAM(S): at 18:41

## 2020-08-14 RX ADMIN — Medication 100 MILLIGRAM(S): at 23:01

## 2020-08-14 RX ADMIN — Medication 325 MILLIGRAM(S): at 18:45

## 2020-08-14 RX ADMIN — Medication 15 MILLIGRAM(S): at 22:47

## 2020-08-14 RX ADMIN — PHENYLEPHRINE HYDROCHLORIDE 8.37 MICROGRAM(S)/KG/MIN: 10 INJECTION INTRAVENOUS at 16:19

## 2020-08-14 RX ADMIN — GABAPENTIN 100 MILLIGRAM(S): 400 CAPSULE ORAL at 09:35

## 2020-08-14 RX ADMIN — SODIUM CHLORIDE 500 MILLILITER(S): 9 INJECTION, SOLUTION INTRAVENOUS at 16:18

## 2020-08-14 RX ADMIN — PANTOPRAZOLE SODIUM 40 MILLIGRAM(S): 20 TABLET, DELAYED RELEASE ORAL at 09:36

## 2020-08-14 RX ADMIN — Medication 15 MILLIGRAM(S): at 22:32

## 2020-08-14 RX ADMIN — CHLORHEXIDINE GLUCONATE 1 APPLICATION(S): 213 SOLUTION TOPICAL at 09:28

## 2020-08-14 RX ADMIN — SODIUM CHLORIDE 3 MILLILITER(S): 9 INJECTION INTRAMUSCULAR; INTRAVENOUS; SUBCUTANEOUS at 09:28

## 2020-08-14 RX ADMIN — TRAMADOL HYDROCHLORIDE 50 MILLIGRAM(S): 50 TABLET ORAL at 09:41

## 2020-08-14 NOTE — CHART NOTE - NSCHARTNOTEFT_GEN_A_CORE
Patient seen in PACU resting without complaints, on elly. No Chest Pain, SOB, N/V.    T(C): 36.1 (08-14-20 @ 13:55), Max: 36.8 (08-14-20 @ 09:05)  HR: 67 (08-14-20 @ 15:00) (67 - 74)  BP: 97/52 (08-14-20 @ 15:00) (84/50 - 145/84)  RR: 12 (08-14-20 @ 15:00) (12 - 18)  SpO2: 99% (08-14-20 @ 15:00) (93% - 99%)      Exam:  Alert and oriented, no acute distress  Laterality: RLE hip aquacel in place, C/D/I with abduction pillow in place  Calves soft, non-tender bilaterally  +PF/DF/EHL/FHL  SILT  + DP pulse    Xray: in chart             A/P: 85yFemale S/p  R Total Hip Arthroplasty. VSS. NAD  -PT/OT-WBAT RLE with posterior precautions, OOB when tolerated  -IS encouraged  -DVT PPx with  mg BID  -Followup AM labs  -Pain Control  -PACU to floor    Lisa Gatica PA-C  Team Pager #[phone]

## 2020-08-14 NOTE — CONSULT NOTE ADULT - ASSESSMENT
84 y/o female s/p right RJ c/b post-op hypotension    PLAN:    Neuro: acute post-op pain, Parkinson's disease, depression, anxiety  - Monitor mental status  - Multimodal control as needed with acetaminophen, ketorolac, and oxycodone  - Home carbidopa-levodopa and escitalopram    Resp: no acute issues  - Monitor pulse oximeter  - Out of bed to chair, ambulate as tolerated, and incentive spirometry to prevent atelectasis    CV: atrial fibrillation, hypotension likely secondary to hypovolemia (resolved)  - Monitor vital signs  - Patient was weaned off phenylephrine infusion upon arrival to SICU, will continue to monitor  - Holding home metoprolol for now but will restart if patient is hemodynamically stable overnight for rate control of atrial fibrillation    GI: no acute issues  - Regular diet as tolerated  - Bowel regimen with senna & Miralax    Renal: hypovolemia  - Monitor I&Os  - Monitor electrolytes and replete as necessary  - NS at 100 mL/hr for hypotension likely secondary to hypovolemia, will IV lock in the AM if patient remains hemodynamically stable    Heme: no acute issues  - Monitor CBC and coags  -  mg BID for VTE prophylaxis as per ortho    ID: no acute issues  - Monitor for clinical evidence of active infection  - 24 hours of alem-operatively Ancef as per ortho    Endo: no acute issues  - Monitor glucose on BMP    MSK: s/p right RJ  - RLE WBAT  - PT/OT    Disposition:  - Full code  - Will admit to SICU    Brandi Cleary PA-C     [phone]

## 2020-08-14 NOTE — CONSULT NOTE ADULT - ATTENDING COMMENTS
Patient seen and examined and agree with above.  85 year old female s/p right total hip replacement presented to the SICU for hemodynamic monitoring  for hypotension on pheynylephrine.  Patient denies chest pain or dyspnea. Hypotension is improving with MAP goal of 65 mmHg or greater.  H/H is stable as there are no signs of hemorrhage.   Will monitor H/H closely for signs of bleeding and monitor SBP to greater than 90 mmHg and wean off phenylephrine.   Metoprolol to be started this morning if patient remains off pressor support.  CC time: 45 min

## 2020-08-14 NOTE — CONSULT NOTE ADULT - SUBJECTIVE AND OBJECTIVE BOX
HISTORY OF PRESENT ILLNESS:  86 y/o female with a PMHx of atrial fibrillation (not on AC, ASA only), MR s/p MVR, HTN, Parkinson's disease, anxiety, depression, GERD, IBS, stress incontinence, SCC of the skin, and right femur fracture s/p IM nail (2019) who presented for a scheduled right RJ due to persistent pain in the RLE. Case was uneventful. She was given 1 L of crystalloid and EBL was 150 mL (no UOP measured). She was transferred to PACU extubated and hemodynamically stable initially but gradually became more hypotensive requiring vasopressor support with a phenylephrine infusion. She was given a 500 mL bolus of LR with no effect on her BP. SICU consulted for hemodynamic monitoring. Patient reports surgical site pain in her right hip but otherwise denies headache, shortness of breath, chest pain, abdominal pain, nausea/vomiting, or weakness.    PAST MEDICAL & SURGICAL HISTORY  - Squamous cell carcinoma of skin  - Irritable bowel syndrome  - PAF (paroxysmal atrial fibrillation)  - Parkinson disease  - Anxiety and depression  - Mitral regurgitation s/p mitral valve replacement (2012 at [place])  - GERD  - Stress incontinence  - Parkinson disease  - History of right femur fracture S/P IM nail  - S/P tonsillectomy and adenoidectomy  - S/P total hysterectomy  - S/P resection of small bowel  - S/P excision of vaginal benign lesion (2019)    HOME MEDICATIONS:  - aspirin 81 mg oral tablet: 2 tab(s) orally once a day  - carbidopa-levodopa 25 mg-100 mg oral tablet: 1 tab(s) orally 5 times a day  - diclofenac sodium 50 mg oral delayed release tablet: 1 tab(s) orally 2 times a day  - escitalopram 10 mg oral tablet: 1 tab(s) orally once a day  - metoprolol succinate 25 mg oral tablet, extended release: 1 tab(s) orally once a day (at bedtime)    ALLERGIES:  - amiodarone (Hives)  - diltiazem (Hives)  - strawberry (Rash)    FAMILY HISTORY: Heart disease and heart failure    SOCIAL HISTORY: Denies EtOH, tobacco, and illicit substance use    CODE STATUS: Full code    REVIEW OF SYSTEMS: 10 point review of systems negative except as noted in HPI    VITAL SIGNS:  T(C): 36.5 (14 Aug 2020 22:00), Max: 36.8 (14 Aug 2020 09:05)  T(F): 97.7 (14 Aug 2020 22:00), Max: 98.2 (14 Aug 2020 09:05)  HR: 72 (15 Aug 2020 01:00) (67 - 80)  BP: 90/55 (15 Aug 2020 01:00) (83/61 - 145/84)  BP(mean): 70 (15 Aug 2020 01:00) (62 - 89)  RR: 15 (15 Aug 2020 01:00) (12 - 21)  SpO2: 93% (15 Aug 2020 01:00) (91% - 100%)    PHYSICAL EXAMINATION:  General - well-nourished, no acute distress  Neuro - awake, alert, oriented x4, no acute focal deficits  HEENT - normocephalic, PERRL, moist mucous membranes  Lungs - clear to auscultation bilaterally  Heart - regular rate and rhythm, S1S2  Abdomen - soft, nontender, nondistended  Extremities - all four extremities are warm & pink with 2+ pulses, strength 5/5, sensation intact, RLE thigh incision dressing C/D/I    LABS:      IMAGING STUDIES: None HISTORY OF PRESENT ILLNESS:  86 y/o female with a PMHx of atrial fibrillation (not on AC, ASA only), MR s/p MVR, HTN, Parkinson's disease, anxiety, depression, GERD, IBS, stress incontinence, SCC of the skin, and right femur fracture s/p IM nail (2019) who presented for a scheduled right RJ due to persistent pain in the RLE. Case was uneventful. She was given 1 L of crystalloid and EBL was 150 mL (no UOP measured). She was transferred to PACU extubated and hemodynamically stable initially but gradually became more hypotensive requiring vasopressor support with a phenylephrine infusion. She was given a 500 mL bolus of LR with no effect on her BP. SICU consulted for hemodynamic monitoring. Patient reports surgical site pain in her right hip but otherwise denies headache, shortness of breath, chest pain, abdominal pain, nausea/vomiting, or weakness.    PAST MEDICAL & SURGICAL HISTORY  - Squamous cell carcinoma of skin  - Irritable bowel syndrome  - PAF (paroxysmal atrial fibrillation)  - Parkinson disease  - Anxiety and depression  - Mitral regurgitation s/p mitral valve replacement (2012 at [place])  - GERD  - Stress incontinence  - Parkinson disease  - History of right femur fracture S/P IM nail  - S/P tonsillectomy and adenoidectomy  - S/P total hysterectomy  - S/P resection of small bowel  - S/P excision of vaginal benign lesion (2019)    HOME MEDICATIONS:  - aspirin 81 mg oral tablet: 2 tab(s) orally once a day  - carbidopa-levodopa 25 mg-100 mg oral tablet: 1 tab(s) orally 5 times a day  - diclofenac sodium 50 mg oral delayed release tablet: 1 tab(s) orally 2 times a day  - escitalopram 10 mg oral tablet: 1 tab(s) orally once a day  - metoprolol succinate 25 mg oral tablet, extended release: 1 tab(s) orally once a day (at bedtime)    ALLERGIES:  - amiodarone (Hives)  - diltiazem (Hives)  - strawberry (Rash)    FAMILY HISTORY: Heart disease and heart failure    SOCIAL HISTORY: Denies EtOH, tobacco, and illicit substance use    CODE STATUS: Full code    REVIEW OF SYSTEMS: 10 point review of systems negative except as noted in HPI    VITAL SIGNS:  T(C): 36.5 (14 Aug 2020 22:00), Max: 36.8 (14 Aug 2020 09:05)  T(F): 97.7 (14 Aug 2020 22:00), Max: 98.2 (14 Aug 2020 09:05)  HR: 72 (15 Aug 2020 01:00) (67 - 80)  BP: 90/55 (15 Aug 2020 01:00) (83/61 - 145/84)  BP(mean): 70 (15 Aug 2020 01:00) (62 - 89)  RR: 15 (15 Aug 2020 01:00) (12 - 21)  SpO2: 93% (15 Aug 2020 01:00) (91% - 100%)    PHYSICAL EXAMINATION:  General - well-nourished, no acute distress  Neuro - awake, alert, oriented x4, no acute focal deficits  HEENT - normocephalic, PERRL, moist mucous membranes  Lungs - clear to auscultation bilaterally  Heart - regular rate and rhythm, S1S2  Abdomen - soft, nontender, nondistended  Extremities - all four extremities are warm & pink with 2+ pulses, strength 5/5, sensation intact, RLE thigh incision dressing C/D/I    LABS:                        10.3   10.21 )-----------( 176      ( 15 Aug 2020 04:47 )             32.9     138  |  107  |  22  ----------------------------<  160<H>  4.5   |  22  |  0.73    Ca    8.4      15 Aug 2020 04:47  Phos  2.5  Mg     2.0    PT/INR - ( 15 Aug 2020 04:47 )   PT: 13.0 sec;   INR: 1.10 ratio    PTT - ( 15 Aug 2020 04:47 )  PTT:27.2 sec    IMAGING STUDIES: None

## 2020-08-14 NOTE — PRE-ANESTHESIA EVALUATION ADULT - NSANTHPMHFT_GEN_ALL_CORE
chart and cv note reviewed. p afib no ac. remote hx mvr - stable clinical status since. et > 1 flight no cp. controlled gerd. mild parkinsons

## 2020-08-15 LAB
ANION GAP SERPL CALC-SCNC: 9 MMOL/L — SIGNIFICANT CHANGE UP (ref 5–17)
APTT BLD: 27.2 SEC — LOW (ref 27.5–35.5)
BUN SERPL-MCNC: 22 MG/DL — SIGNIFICANT CHANGE UP (ref 7–23)
CALCIUM SERPL-MCNC: 8.4 MG/DL — SIGNIFICANT CHANGE UP (ref 8.4–10.5)
CHLORIDE SERPL-SCNC: 107 MMOL/L — SIGNIFICANT CHANGE UP (ref 96–108)
CO2 SERPL-SCNC: 22 MMOL/L — SIGNIFICANT CHANGE UP (ref 22–31)
CREAT SERPL-MCNC: 0.73 MG/DL — SIGNIFICANT CHANGE UP (ref 0.5–1.3)
GLUCOSE SERPL-MCNC: 160 MG/DL — HIGH (ref 70–99)
HCT VFR BLD CALC: 32.9 % — LOW (ref 34.5–45)
HGB BLD-MCNC: 10.3 G/DL — LOW (ref 11.5–15.5)
INR BLD: 1.1 RATIO — SIGNIFICANT CHANGE UP (ref 0.88–1.16)
MAGNESIUM SERPL-MCNC: 2 MG/DL — SIGNIFICANT CHANGE UP (ref 1.6–2.6)
MCHC RBC-ENTMCNC: 31 PG — SIGNIFICANT CHANGE UP (ref 27–34)
MCHC RBC-ENTMCNC: 31.3 GM/DL — LOW (ref 32–36)
MCV RBC AUTO: 99.1 FL — SIGNIFICANT CHANGE UP (ref 80–100)
NRBC # BLD: 0 /100 WBCS — SIGNIFICANT CHANGE UP (ref 0–0)
PHOSPHATE SERPL-MCNC: 2.5 MG/DL — SIGNIFICANT CHANGE UP (ref 2.5–4.5)
PLATELET # BLD AUTO: 176 K/UL — SIGNIFICANT CHANGE UP (ref 150–400)
POTASSIUM SERPL-MCNC: 4.5 MMOL/L — SIGNIFICANT CHANGE UP (ref 3.5–5.3)
POTASSIUM SERPL-SCNC: 4.5 MMOL/L — SIGNIFICANT CHANGE UP (ref 3.5–5.3)
PROTHROM AB SERPL-ACNC: 13 SEC — SIGNIFICANT CHANGE UP (ref 10.6–13.6)
RBC # BLD: 3.32 M/UL — LOW (ref 3.8–5.2)
RBC # FLD: 12.2 % — SIGNIFICANT CHANGE UP (ref 10.3–14.5)
SODIUM SERPL-SCNC: 138 MMOL/L — SIGNIFICANT CHANGE UP (ref 135–145)
WBC # BLD: 10.21 K/UL — SIGNIFICANT CHANGE UP (ref 3.8–10.5)
WBC # FLD AUTO: 10.21 K/UL — SIGNIFICANT CHANGE UP (ref 3.8–10.5)

## 2020-08-15 PROCEDURE — 99232 SBSQ HOSP IP/OBS MODERATE 35: CPT

## 2020-08-15 RX ORDER — SODIUM,POTASSIUM PHOSPHATES 278-250MG
1 POWDER IN PACKET (EA) ORAL
Refills: 0 | Status: COMPLETED | OUTPATIENT
Start: 2020-08-15 | End: 2020-08-16

## 2020-08-15 RX ORDER — CARBIDOPA AND LEVODOPA 25; 100 MG/1; MG/1
1 TABLET ORAL
Refills: 0 | Status: DISCONTINUED | OUTPATIENT
Start: 2020-08-15 | End: 2020-08-18

## 2020-08-15 RX ORDER — SENNA PLUS 8.6 MG/1
2 TABLET ORAL AT BEDTIME
Refills: 0 | Status: DISCONTINUED | OUTPATIENT
Start: 2020-08-15 | End: 2020-08-18

## 2020-08-15 RX ADMIN — SENNA PLUS 2 TABLET(S): 8.6 TABLET ORAL at 22:06

## 2020-08-15 RX ADMIN — Medication 15 MILLIGRAM(S): at 22:40

## 2020-08-15 RX ADMIN — CARBIDOPA AND LEVODOPA 1 TABLET(S): 25; 100 TABLET ORAL at 14:15

## 2020-08-15 RX ADMIN — Medication 15 MILLIGRAM(S): at 09:54

## 2020-08-15 RX ADMIN — CHLORHEXIDINE GLUCONATE 1 APPLICATION(S): 213 SOLUTION TOPICAL at 05:15

## 2020-08-15 RX ADMIN — Medication 15 MILLIGRAM(S): at 10:10

## 2020-08-15 RX ADMIN — Medication 1 PACKET(S): at 09:54

## 2020-08-15 RX ADMIN — Medication 15 MILLIGRAM(S): at 04:16

## 2020-08-15 RX ADMIN — CARBIDOPA AND LEVODOPA 1 TABLET(S): 25; 100 TABLET ORAL at 09:56

## 2020-08-15 RX ADMIN — ESCITALOPRAM OXALATE 10 MILLIGRAM(S): 10 TABLET, FILM COATED ORAL at 09:54

## 2020-08-15 RX ADMIN — CARBIDOPA AND LEVODOPA 1 TABLET(S): 25; 100 TABLET ORAL at 22:05

## 2020-08-15 RX ADMIN — Medication 975 MILLIGRAM(S): at 06:30

## 2020-08-15 RX ADMIN — Medication 975 MILLIGRAM(S): at 13:30

## 2020-08-15 RX ADMIN — POLYETHYLENE GLYCOL 3350 17 GRAM(S): 17 POWDER, FOR SOLUTION ORAL at 09:56

## 2020-08-15 RX ADMIN — CARBIDOPA AND LEVODOPA 1 TABLET(S): 25; 100 TABLET ORAL at 05:10

## 2020-08-15 RX ADMIN — Medication 975 MILLIGRAM(S): at 06:00

## 2020-08-15 RX ADMIN — Medication 975 MILLIGRAM(S): at 12:59

## 2020-08-15 RX ADMIN — Medication 325 MILLIGRAM(S): at 05:10

## 2020-08-15 RX ADMIN — Medication 100 MILLIGRAM(S): at 06:00

## 2020-08-15 RX ADMIN — Medication 15 MILLIGRAM(S): at 17:20

## 2020-08-15 RX ADMIN — Medication 101.6 MILLIGRAM(S): at 05:11

## 2020-08-15 RX ADMIN — Medication 15 MILLIGRAM(S): at 04:31

## 2020-08-15 RX ADMIN — CARBIDOPA AND LEVODOPA 1 TABLET(S): 25; 100 TABLET ORAL at 17:20

## 2020-08-15 RX ADMIN — Medication 15 MILLIGRAM(S): at 22:05

## 2020-08-15 RX ADMIN — Medication 325 MILLIGRAM(S): at 17:20

## 2020-08-15 NOTE — PROGRESS NOTE ADULT - SUBJECTIVE AND OBJECTIVE BOX
Patient seen and evaluated at bedside. Patients Pain well controlled. No acute events overnight. Off pressors    ICU Vital Signs Last 24 Hrs  T(C): 36.5 (14 Aug 2020 22:00), Max: 36.8 (14 Aug 2020 09:05)  T(F): 97.7 (14 Aug 2020 22:00), Max: 98.2 (14 Aug 2020 09:05)  HR: 72 (15 Aug 2020 03:00) (67 - 80)  BP: 91/53 (15 Aug 2020 03:00) (83/61 - 145/84)  BP(mean): 70 (15 Aug 2020 03:00) (62 - 89)  ABP: --  ABP(mean): --  RR: 15 (15 Aug 2020 03:00) (12 - 21)  SpO2: 94% (15 Aug 2020 03:00) (91% - 100%)      Exam:  Gen: NAD  Right Lower Extremity:  Dressing  clean/dry/intact  Soft compartments  Negative calf ttp  +EHL/FHL/TA/GSc  SILT L2-S1  DP+

## 2020-08-15 NOTE — PROGRESS NOTE ADULT - ASSESSMENT
86 y/o female s/p right RJ c/b post-op hypotension    PLAN:    Neuro: acute post-op pain, Parkinson's disease, depression, anxiety  - Monitor mental status  - Multimodal control as needed with acetaminophen, ketorolac, and oxycodone  - Home carbidopa-levodopa and escitalopram    Resp: no acute issues  - Monitor pulse oximeter  - Out of bed to chair, ambulate as tolerated, and incentive spirometry to prevent atelectasis    CV: atrial fibrillation, hypotension likely secondary to hypovolemia (resolved)  - Monitor vital signs  - Patient was weaned off phenylephrine infusion upon arrival to SICU, will continue to monitor  - Holding home metoprolol for now as patient continues to drop H&H although she remains hemodynamically stable    GI: no acute issues  - Regular diet as tolerated  - Bowel regimen with senna & Miralax    Renal: hypovolemia  - Monitor I&Os  - Monitor electrolytes and replete as necessary  - NS at 100 mL/hr for hypotension likely secondary to hypovolemia, will IV lock in the AM if patient remains hemodynamically stable    Heme: acute blood loss anemia  - Monitor CBC and coags, HCT downtrending  -  mg BID for VTE prophylaxis as per ortho    ID: no acute issues  - Monitor for clinical evidence of active infection  - 24 hours of alem-operatively Ancef as per ortho    Endo: no acute issues  - Monitor glucose on BMP    MSK: s/p right RJ  - RLE WBAT  - PT/OT    Disposition:  - Full code  - Will admit to SICU    Brandi Cleary PA-C     [phone]

## 2020-08-15 NOTE — OCCUPATIONAL THERAPY INITIAL EVALUATION ADULT - PERTINENT HX OF CURRENT PROBLEM, REHAB EVAL
85F PMH of HTN,  afib no AC on aspirin ,  MR  s/p MVR ( 2012),  Parkinson's disease . Pt has experiencing right hip pain. Pain presents in the groin mainly, however, patient says this all began after she fell 1 year ago, sustaining a fracture to her right femur that required surgical fixation

## 2020-08-15 NOTE — PHYSICAL THERAPY INITIAL EVALUATION ADULT - DISCHARGE DISPOSITION, PT EVAL
subacute rehab. if patient goes home, recommend home with home PT, pt owns RW, commode, shower chair, recommend transport chair & assist for all functional mobility./rehabilitation facility

## 2020-08-15 NOTE — PHYSICAL THERAPY INITIAL EVALUATION ADULT - ADDITIONAL COMMENTS
Pt resides in private home, alone, 2 steps to enter with bilateral handrail, one level within. PTA pt ambulatory with RW, owns shower chair & commode, has HHA 8hours/6days.

## 2020-08-15 NOTE — PHYSICAL THERAPY INITIAL EVALUATION ADULT - GAIT DEVIATIONS NOTED, PT EVAL
decreased step length/increased time in double stance/decreased velocity of limb motion/decreased stride length/decreased weight-shifting ability/decreased munir

## 2020-08-15 NOTE — PHYSICAL THERAPY INITIAL EVALUATION ADULT - PERTINENT HX OF CURRENT PROBLEM, REHAB EVAL
Pt is 85F admitted 8/14/20 PMH HTN, afib no AC on aspirin, MR  s/p MVR (2012),  Parkinson's disease. Pt has experiencing right hip pain. Pain presents in the groin mainly, however, patient says this all began after she fell 1 year ago, sustaining a fracture to her right femur that required surgical fixation it was done at [facility] by Dr. Orlando Ram. Pt was seen by ortho tried conservative management with no good effect. Presents to [facility] for scheduled Right Total Hip replacement.

## 2020-08-15 NOTE — PHYSICAL THERAPY INITIAL EVALUATION ADULT - GENERAL OBSERVATIONS, REHAB EVAL
received semisupine in bed, A&ox4, following commands, pleasant & eager to participate, pre-medicated prior to session; h/o PD, s/p right THR, removal of IMN(8/14), RLE WBAT, posterior hip precuations - pt educated with good understanding. received semisupine in bed, A&ox4, following commands, pleasant & eager to participate, pre-medicated prior to session; h/o PD, s/p right THR, removal of IMN(8/14), RLE WBAT, posterior hip precuations - pt educated with good understanding., +supplemental 02, +ICU monitoring.

## 2020-08-15 NOTE — PROGRESS NOTE ADULT - SUBJECTIVE AND OBJECTIVE BOX
HISTORY:  86 y/o female with a PMHx of atrial fibrillation (not on AC, ASA only), MR s/p MVR, HTN, Parkinson's disease, anxiety, depression, GERD, IBS, stress incontinence, SCC of the skin, and right femur fracture s/p IM nail (2019) who presented for a scheduled right RJ due to persistent pain in the RLE. Case was uneventful. She was given 1 L of crystalloid and EBL was 150 mL (no UOP measured). She was transferred to PACU extubated and hemodynamically stable initially but gradually became more hypotensive requiring vasopressor support with a phenylephrine infusion. She was given a 500 mL bolus of LR with no effect on her BP. SICU consulted for hemodynamic monitoring. Patient reports surgical site pain in her right hip but otherwise denies headache, shortness of breath, chest pain, abdominal pain, nausea/vomiting, or weakness.    OVERNIGHT EVENTS:  - HCT dropped post-op from 44.4 to 35.3 but patient remained hemodynamically stable overnight  - Restarted home metoprolol HISTORY:  86 y/o female with a PMHx of atrial fibrillation (not on AC, ASA only), MR s/p MVR, HTN, Parkinson's disease, anxiety, depression, GERD, IBS, stress incontinence, SCC of the skin, and right femur fracture s/p IM nail (2019) who presented for a scheduled right RJ due to persistent pain in the RLE. Case was uneventful. She was given 1 L of crystalloid and EBL was 150 mL (no UOP measured). She was transferred to PACU extubated and hemodynamically stable initially but gradually became more hypotensive requiring vasopressor support with a phenylephrine infusion. She was given a 500 mL bolus of LR with no effect on her BP. SICU consulted for hemodynamic monitoring. Patient reports surgical site pain in her right hip but otherwise denies headache, shortness of breath, chest pain, abdominal pain, nausea/vomiting, or weakness.    OVERNIGHT EVENTS:  - HCT dropped post-op from 44.4 -> 35.3 -> 32.9 but patient remained hemodynamically stable overnight  - Restarted home metoprolol    SUBJECTIVE/ROS:  [x] A ten-point review of systems was otherwise negative except as noted.  [ ] Due to altered mental status/intubation, subjective information were not able to be obtained from the patient. History was obtained, to the extent possible, from review of the chart and collateral sources of information.    NEURO  Exam: awake, alert, oriented x4, no acute focal deficits  Meds:  - acetaminophen   Tablet .. 975 milliGRAM(s) Oral every 6 hours  - carbidopa/levodopa  25/100 1 Tablet(s) Oral <User Schedule>  - escitalopram 10 milliGRAM(s) Oral daily  - ketorolac   Injectable 15 milliGRAM(s) IV Push every 6 hours  - oxyCODONE    IR 2.5 milliGRAM(s) Oral every 4 hours PRN Moderate Pain (4 - 6)  - oxyCODONE    IR 5 milliGRAM(s) Oral every 4 hours PRN Severe Pain (7 - 10)  [x] Adequacy of sedation and pain control has been assessed and adjusted    RESPIRATORY  RR: 19 (08-15-20 @ 05:00) (12 - 21)  SpO2: 99% (08-15-20 @ 05:00) (91% - 100%)  Exam: clear to auscultation bilaterally  Mechanical Ventilation: no  [N/A] Extubation Readiness Assessed  Meds: none    CARDIOVASCULAR  HR: 68 (08-15-20 @ 05:00) (67 - 80)  BP: 89/54 (08-15-20 @ 05:00) (83/61 - 145/84)  BP(mean): 69 (08-15-20 @ 05:00) (62 - 89)  Exam: irregularly irregular  Cardiac Rhythm: atrial fibrillation  Perfusion    [x]Adequate    [ ]Inadequate  Mentation   [x]Normal       [ ]Reduced  Extremities  [x]Warm         [ ]Cool  Volume Status [ ]Hypervolemic [x]Euvolemic [ ]Hypovolemic  Meds: none    GI/NUTRITION  Exam: soft, nontender, nondistended  Diet: regular  Meds:   - ondansetron Injectable 4 milliGRAM(s) IV Push every 6 hours PRN Nausea and/or Vomiting  - polyethylene glycol 3350 17 Gram(s) Oral daily  - senna 2 Tablet(s) Oral at bedtime    GENITOURINARY  I&O's Detail    08-14 @ 07:01  -  08-15 @ 05:44  --------------------------------------------------------  IN:    IV PiggyBack: 50 mL    Lactated Ringers IV Bolus: 500 mL    phenylephrine   Infusion: 23.5 mL    sodium chloride 0.9%: 600 mL    sodium chloride 0.9%.: 500 mL  Total IN: 1673.5 mL    OUT:    Voided: 850 mL  Total OUT: 850 mL    Total NET: 823.5 mL    138  |  107  |  22  ----------------------------<  160<H>  4.5   |  22  |  0.73    Ca    8.4      15 Aug 2020 04:47  Phos  2.5  Mg     2.0    [ ] Parrish catheter, indication: N/A  Meds: sodium chloride 0.9% infuse at 100 mL/hr    HEMATOLOGIC  Meds: aspirin enteric coated 325 milliGRAM(s) Oral two times a day  [x] VTE Prophylaxis                        10.3   10.21 )-----------( 176      ( 15 Aug 2020 04:47 )             32.9     PT/INR - ( 15 Aug 2020 04:47 )   PT: 13.0 sec;   INR: 1.10 ratio    PTT - ( 15 Aug 2020 04:47 )  PTT:27.2 sec    INFECTIOUS DISEASES  T(C): 36.4 (08-15-20 @ 03:00), Max: 36.8 (08-14-20 @ 09:05)  WBC Count:  - 10.21 K/uL (08-15 @ 04:47)  - 16.40 K/uL (08-14 @ 20:22)  Recent Cultures: none  Meds: ceFAZolin   IVPB 2000 milliGRAM(s) IV Intermittent every 8 hours    ENDOCRINE  Capillary Blood Glucose: none  Meds: none    ACCESS DEVICES:  [x] Peripheral IV  [ ] Central Venous Line	[ ] R	[ ] L	[ ] IJ	[ ] Fem	[ ] SC	Placed:   [ ] Arterial Line		[ ] R	[ ] L	[ ] Fem	[ ] Rad	[ ] Ax	Placed:   [ ] PICC:					[ ] Mediport  [ ] Urinary Catheter, Date Placed:   [x] Necessity of urinary, arterial, and venous catheters discussed    OTHER MEDICATIONS: chlorhexidine 2% Cloths 1 Application(s) Topical <User Schedule>    CODE STATUS: Full code    IMAGING:

## 2020-08-15 NOTE — OCCUPATIONAL THERAPY INITIAL EVALUATION ADULT - PRECAUTIONS/LIMITATIONS, REHAB EVAL
Cont'd:  Pt was seen by ortho tried conservative management with no good effect . Presents to [facility] for scheduled Right Total Hip replacement on 8/14/20./fall precautions

## 2020-08-15 NOTE — OCCUPATIONAL THERAPY INITIAL EVALUATION ADULT - LIVES WITH, PROFILE
Pt lives alone in a pvt home, 2 [place]MAS. Pt with HHA 6 days a week-8 hrs a day. Assists with IADLs and transfers.

## 2020-08-16 ENCOUNTER — TRANSCRIPTION ENCOUNTER (OUTPATIENT)
Age: 85
End: 2020-08-16

## 2020-08-16 LAB
ANION GAP SERPL CALC-SCNC: 6 MMOL/L — SIGNIFICANT CHANGE UP (ref 5–17)
APTT BLD: 26.3 SEC — LOW (ref 27.5–35.5)
BUN SERPL-MCNC: 27 MG/DL — HIGH (ref 7–23)
CALCIUM SERPL-MCNC: 8.5 MG/DL — SIGNIFICANT CHANGE UP (ref 8.4–10.5)
CHLORIDE SERPL-SCNC: 108 MMOL/L — SIGNIFICANT CHANGE UP (ref 96–108)
CO2 SERPL-SCNC: 27 MMOL/L — SIGNIFICANT CHANGE UP (ref 22–31)
CREAT SERPL-MCNC: 0.79 MG/DL — SIGNIFICANT CHANGE UP (ref 0.5–1.3)
GLUCOSE SERPL-MCNC: 107 MG/DL — HIGH (ref 70–99)
HCT VFR BLD CALC: 27.3 % — LOW (ref 34.5–45)
HGB BLD-MCNC: 8.9 G/DL — LOW (ref 11.5–15.5)
INR BLD: 1 RATIO — SIGNIFICANT CHANGE UP (ref 0.88–1.16)
MAGNESIUM SERPL-MCNC: 2 MG/DL — SIGNIFICANT CHANGE UP (ref 1.6–2.6)
MCHC RBC-ENTMCNC: 31.8 PG — SIGNIFICANT CHANGE UP (ref 27–34)
MCHC RBC-ENTMCNC: 32.6 GM/DL — SIGNIFICANT CHANGE UP (ref 32–36)
MCV RBC AUTO: 97.5 FL — SIGNIFICANT CHANGE UP (ref 80–100)
NRBC # BLD: 0 /100 WBCS — SIGNIFICANT CHANGE UP (ref 0–0)
PHOSPHATE SERPL-MCNC: 1.8 MG/DL — LOW (ref 2.5–4.5)
PLATELET # BLD AUTO: 148 K/UL — LOW (ref 150–400)
POTASSIUM SERPL-MCNC: 4.5 MMOL/L — SIGNIFICANT CHANGE UP (ref 3.5–5.3)
POTASSIUM SERPL-SCNC: 4.5 MMOL/L — SIGNIFICANT CHANGE UP (ref 3.5–5.3)
PROTHROM AB SERPL-ACNC: 11.9 SEC — SIGNIFICANT CHANGE UP (ref 10.6–13.6)
RBC # BLD: 2.8 M/UL — LOW (ref 3.8–5.2)
RBC # FLD: 12.5 % — SIGNIFICANT CHANGE UP (ref 10.3–14.5)
SARS-COV-2 RNA SPEC QL NAA+PROBE: SIGNIFICANT CHANGE UP
SODIUM SERPL-SCNC: 141 MMOL/L — SIGNIFICANT CHANGE UP (ref 135–145)
WBC # BLD: 9.58 K/UL — SIGNIFICANT CHANGE UP (ref 3.8–10.5)
WBC # FLD AUTO: 9.58 K/UL — SIGNIFICANT CHANGE UP (ref 3.8–10.5)

## 2020-08-16 PROCEDURE — 71045 X-RAY EXAM CHEST 1 VIEW: CPT | Mod: 26

## 2020-08-16 RX ORDER — SODIUM CHLORIDE 9 MG/ML
500 INJECTION INTRAMUSCULAR; INTRAVENOUS; SUBCUTANEOUS ONCE
Refills: 0 | Status: COMPLETED | OUTPATIENT
Start: 2020-08-16 | End: 2020-08-16

## 2020-08-16 RX ADMIN — Medication 325 MILLIGRAM(S): at 06:08

## 2020-08-16 RX ADMIN — ESCITALOPRAM OXALATE 10 MILLIGRAM(S): 10 TABLET, FILM COATED ORAL at 11:53

## 2020-08-16 RX ADMIN — CARBIDOPA AND LEVODOPA 1 TABLET(S): 25; 100 TABLET ORAL at 10:27

## 2020-08-16 RX ADMIN — POLYETHYLENE GLYCOL 3350 17 GRAM(S): 17 POWDER, FOR SOLUTION ORAL at 11:53

## 2020-08-16 RX ADMIN — CARBIDOPA AND LEVODOPA 1 TABLET(S): 25; 100 TABLET ORAL at 13:50

## 2020-08-16 RX ADMIN — Medication 1 PACKET(S): at 06:08

## 2020-08-16 RX ADMIN — Medication 975 MILLIGRAM(S): at 06:08

## 2020-08-16 RX ADMIN — Medication 325 MILLIGRAM(S): at 17:23

## 2020-08-16 RX ADMIN — SODIUM CHLORIDE 1000 MILLILITER(S): 9 INJECTION INTRAMUSCULAR; INTRAVENOUS; SUBCUTANEOUS at 13:48

## 2020-08-16 RX ADMIN — Medication 975 MILLIGRAM(S): at 17:23

## 2020-08-16 RX ADMIN — CARBIDOPA AND LEVODOPA 1 TABLET(S): 25; 100 TABLET ORAL at 19:40

## 2020-08-16 RX ADMIN — ONDANSETRON 4 MILLIGRAM(S): 8 TABLET, FILM COATED ORAL at 17:24

## 2020-08-16 RX ADMIN — CARBIDOPA AND LEVODOPA 1 TABLET(S): 25; 100 TABLET ORAL at 06:08

## 2020-08-16 RX ADMIN — Medication 975 MILLIGRAM(S): at 11:53

## 2020-08-16 RX ADMIN — CARBIDOPA AND LEVODOPA 1 TABLET(S): 25; 100 TABLET ORAL at 22:17

## 2020-08-16 RX ADMIN — Medication 975 MILLIGRAM(S): at 06:48

## 2020-08-16 NOTE — DISCHARGE NOTE PROVIDER - NSDCMRMEDTOKEN_GEN_ALL_CORE_FT
aspirin 81 mg oral tablet: 2 tab(s) orally once a day - last dose on 8/5/2019 as per patient as per cardiologist   carbidopa-levodopa 25 mg-100 mg oral tablet: 1 tab(s) orally 5 times a day  cbd oil: Topical as needed  diclofenac sodium 50 mg oral delayed release tablet: 1 tab(s) orally 2 times a day  escitalopram 10 mg oral tablet: 1 tab(s) orally once a day  metoprolol succinate 25 mg oral tablet, extended release: 1 tab(s) orally once a day (at bedtime) acetaminophen 325 mg oral tablet: 3 tab(s) orally every 8 hours, As Needed, mild pain, temp &gt;100.4F  aspirin 325 mg oral delayed release tablet: 1 tab(s) orally 2 times a day x 6 weeks total for dvt prevention, then resume normal aspirin regimen  carbidopa-levodopa 25 mg-100 mg oral tablet: 1 tab(s) orally   cbd oil: Topical as needed  escitalopram 10 mg oral tablet: 1 tab(s) orally once a day  metoprolol succinate 25 mg oral tablet, extended release: 1 tab(s) orally once a day (at bedtime)  oxyCODONE 5 mg oral tablet: 0.5-1 tab(s) orally every 4-6 hours, As needed, Moderate to Severe Pain.  polyethylene glycol 3350 oral powder for reconstitution: 17 gram(s) orally once a day  senna oral tablet: 2 tab(s) orally once a day (at bedtime) acetaminophen 325 mg oral tablet: 3 tab(s) orally every 8 hours, As Needed, mild pain, temp &gt;100.4F  aspirin 325 mg oral delayed release tablet: 1 tab(s) orally 2 times a day x 6 weeks total for dvt prevention, then resume normal aspirin regimen  carbidopa-levodopa 25 mg-100 mg oral tablet: 1 tab(s) orally 5 times a day  cbd oil: Topical as needed  escitalopram 10 mg oral tablet: 1 tab(s) orally once a day  metoprolol succinate 25 mg oral tablet, extended release: 1 tab(s) orally once a day (at bedtime)  oxyCODONE 5 mg oral tablet: 0.5-1 tab(s) orally every 4-6 hours, As needed, Moderate to Severe Pain.  polyethylene glycol 3350 oral powder for reconstitution: 17 gram(s) orally once a day  senna oral tablet: 2 tab(s) orally once a day (at bedtime)

## 2020-08-16 NOTE — DISCHARGE NOTE PROVIDER - CARE PROVIDER_API CALL
Valerio Pop  ORTHOPAEDIC SURGERY  [address] [place] [address]  Phone: [phone]  Fax: [phone]  Follow Up Time:

## 2020-08-16 NOTE — DISCHARGE NOTE PROVIDER - HOSPITAL COURSE
Reason for Admission    right hip replacement         History of Present Illness:    History of Present Illness        84 y/o female with PMH of HTN,  afib no AC on aspirin ,  MR  s/p MVR ( 2012),  Parkinson's disease . Pt has experiencing right hip pain. Pain presents in the groin mainly, however, patient says this all began after she fell 1 year ago, sustaining a fracture to her right femur that required surgical fixation it was done at [facility] by Dr. Orlando Ram. Pt was seen by ortho tried conservative management with no good effect . Presents to [facility] for scheduled Right Total Hip replacement on 8/14/20.        Covid test on 8/11/20     medical eval on 8/11/20    cardiology eval done 2 weeks ago - will call for all  reports and eval         Past Medical, Past Surgical History:    PAST MEDICAL HISTORY:    Anxiety and depression     Chronic GERD     H/O irritable bowel syndrome     H/O mitral valve replacement 2012    Other specified noninflammatory disorders of vagina     PAF (paroxysmal atrial fibrillation) no AC on aspirin    Parkinson disease followed by neurolohgist ,last week 2 weeks ago carotid doppler done - "normal"    Parkinson disease     Squamous cell carcinoma of skin     Stress incontinence.         PAST SURGICAL HISTORY:    H/O mitral valve replacement 2012--[employer] [facility] [employer] -- Serial # [id number], Model # [id number], implant date: May 3, 2012    Implanted Physician : Italo Jones -[facility].    H/O resection of small bowel     H/O total hysterectomy     History of femur fracture Repaired in 6/ 2019    History of tonsillectomy and adenoidectomy     Other specified noninflammatory disorder of vagina s/p excision of vaginal lesion- benign - 2019    S/P hysterectomy     S/P small bowel resection     S/P tonsillectomy.         HOSPITAL COURSE:    84 y/o FM underwent right hip removal of hardware, right total hip arthroplasty  on 8/14/2020 with Dr. Pop.  Patient tolerated procedure well.  Patient was evaluated postoperatively by physical and occupational therapists for weight bearing as tolerated ambulation with rolling walker and advised that patient would benefit from admission to rehab facility.  Patient advised to keep surgical incision/dressing clean and dry, and have dressing and surgical staples removed and steri strips applied post op day #14 (8/28/2020).  Patient further advised to follow up with Dr. Pop 3-4 weeks post op.

## 2020-08-16 NOTE — PROGRESS NOTE ADULT - ATTENDING COMMENTS
Patient is a 85y old  Female who presents with a chief complaint of right hip replacement after rempval of Synthes Nail  (07 Aug 2020 11:44)        Agree with last Resident or Physician's Assistant note:    Patient comfortable  No complaints    PHYSICAL EXAM:  NAD, Alert    [ ] Hip: Dressing C/D/I;  (+) DF/PF; (+) Distal Pulses; No Calf tenderness B/L, PAS     Plan for physical therapy to get out of bed, ambulate full weight bearing as tolerated. work on knee ROM        Plan for Disposition:      Valerio Pop MD  [facility]  [phone]
84 y/o female s/p right RJ c/b post-op hypotension  Hypotension resolved off Dipak-Synephrine  Hold beta blocker  PO  Mild drop in HCT still > 10  Continue meds for parkinson's disease  PT  DVT prophylaxis with ASA as per ortho
Patient is a 85y old  Female who presents with a chief complaint of R Hip Removal of hardware and Total Hip Arthroplasty (16 Aug 2020 07:03)        Agree with last Resident or Physician's Assistant note:    Patient comfortable  No complaints        Plan for physical therapy to continue to get out of bed, ambulate full weight bearing as tolerated. work on knee ROM           Plan for Disposition:  rehabilitation       Valerio Pop MD  [facility]  [phone]

## 2020-08-16 NOTE — DISCHARGE NOTE PROVIDER - NSDCFUSCHEDAPPT_GEN_ALL_CORE_FT
[facility] ; 10/07/2020 ; NPP Derm [address] [facility] ; 10/09/2020 ; NPP Derm [address] [facility] ; 10/07/2020 ; NPP Derm [address] [facility] ; 10/09/2020 ; NPP Derm [address] [facility] ; 10/07/2020 ; NPP Derm [address] [facility] ; 10/09/2020 ; NPP Derm [address] [facility] ; 10/07/2020 ; NPP Derm [address] [facility] ; 10/09/2020 ; NPP Derm [address] [facility]

## 2020-08-16 NOTE — DISCHARGE NOTE PROVIDER - NSDCCPCAREPLAN_GEN_ALL_CORE_FT
PRINCIPAL DISCHARGE DIAGNOSIS  Diagnosis: Unilateral primary osteoarthritis, right hip  Assessment and Plan of Treatment:       SECONDARY DISCHARGE DIAGNOSES  Diagnosis: Need for prophylactic measure  Assessment and Plan of Treatment:

## 2020-08-16 NOTE — PROVIDER CONTACT NOTE (OTHER) - ACTION/TREATMENT ORDERED:
Alex made aware. As per PA 500cc bolus and 1 U PRBC's will be ordered. safety maintained. will continue to monitor.

## 2020-08-16 NOTE — DISCHARGE NOTE PROVIDER - NSDCFUADDINST_GEN_ALL_CORE_FT
keep surgical incision/dressing clean and dry keep surgical incision/dressing clean and dry, have dressing/sutures/stapled removed and steri-strips applied post operative day #14 (8/28/2020). Continue weight bearing as tolerated ambulation with posterior hip precautions. Follow up with Dr. Pop in his office 3-4 weeks post op

## 2020-08-16 NOTE — PROGRESS NOTE ADULT - ASSESSMENT
A/p: 85y Female s/p R Hip removal of hardware and total hip arthroplasty.  VSS. NAD.    PT/OT-WBAT RLE, Posterior Hip Precautions, Abd pillow while in bed.  IS  DVT PPx: ASA 325mg PO BID and SCDs  Pain Control  Continue Current Tx.  Dispo planning for Sub Acute Rehab    Alex Resendiz PA-C  Orthopedic Surgery Team  Team Pager: #[phone]

## 2020-08-16 NOTE — PROVIDER CONTACT NOTE (OTHER) - ACTION/TREATMENT ORDERED:
Carloz Hector MD made aware. As per MD no interventions @this time. Give pt more time and continue to monitor. Safety maintained. Will continue to monitor.

## 2020-08-16 NOTE — PROGRESS NOTE ADULT - SUBJECTIVE AND OBJECTIVE BOX
Post op Day [2]    Patient resting without complaints.  No chest pain, SOB, N/V.    T(C): 36.6 (08-16-20 @ 05:09), Max: 37 (08-15-20 @ 15:00)  HR: 80 (08-16-20 @ 05:09) (66 - 99)  BP: 105/61 (08-16-20 @ 05:09) (90/51 - 113/52)  RR: 18 (08-16-20 @ 05:09) (18 - 34)  SpO2: 93% (08-16-20 @ 05:09) (93% - 100%)      Exam:  Alert and Oriented, No Acute Distress    Lower Extremities: R Hip  Dressing: Aquacel, mild saturation proximal and distal ends.  Calves Soft, Non-tender bilaterally  +PF/DF/EHL/FHL  SILT  +DP Pulse, Doppler signal appreciated                              8.9    9.58  )-----------( 148      ( 16 Aug 2020 06:33 )             27.3    08-16    141  |  108  |  27<H>  ----------------------------<  107<H>  4.5   |  27  |  0.79    Ca    8.5      16 Aug 2020 06:33  Phos  1.8     08-16  Mg     2.0     08-16

## 2020-08-16 NOTE — DISCHARGE NOTE PROVIDER - NSDCACTIVITY_GEN_ALL_CORE
Stairs allowed/Walking - Indoors allowed/Walking - Outdoors allowed/No heavy lifting/straining/Do not drive or operate machinery/Do not make important decisions

## 2020-08-17 DIAGNOSIS — Z96.641 PRESENCE OF RIGHT ARTIFICIAL HIP JOINT: ICD-10-CM

## 2020-08-17 LAB
ANION GAP SERPL CALC-SCNC: 8 MMOL/L — SIGNIFICANT CHANGE UP (ref 5–17)
BLD GP AB SCN SERPL QL: NEGATIVE — SIGNIFICANT CHANGE UP
BUN SERPL-MCNC: 24 MG/DL — HIGH (ref 7–23)
CALCIUM SERPL-MCNC: 8.1 MG/DL — LOW (ref 8.4–10.5)
CHLORIDE SERPL-SCNC: 107 MMOL/L — SIGNIFICANT CHANGE UP (ref 96–108)
CO2 SERPL-SCNC: 26 MMOL/L — SIGNIFICANT CHANGE UP (ref 22–31)
CREAT SERPL-MCNC: 0.69 MG/DL — SIGNIFICANT CHANGE UP (ref 0.5–1.3)
GLUCOSE SERPL-MCNC: 93 MG/DL — SIGNIFICANT CHANGE UP (ref 70–99)
HCT VFR BLD CALC: 29.9 % — LOW (ref 34.5–45)
HGB BLD-MCNC: 9.7 G/DL — LOW (ref 11.5–15.5)
MCHC RBC-ENTMCNC: 30.5 PG — SIGNIFICANT CHANGE UP (ref 27–34)
MCHC RBC-ENTMCNC: 32.4 GM/DL — SIGNIFICANT CHANGE UP (ref 32–36)
MCV RBC AUTO: 94 FL — SIGNIFICANT CHANGE UP (ref 80–100)
NRBC # BLD: 0 /100 WBCS — SIGNIFICANT CHANGE UP (ref 0–0)
PLATELET # BLD AUTO: 146 K/UL — LOW (ref 150–400)
POTASSIUM SERPL-MCNC: 4.2 MMOL/L — SIGNIFICANT CHANGE UP (ref 3.5–5.3)
POTASSIUM SERPL-SCNC: 4.2 MMOL/L — SIGNIFICANT CHANGE UP (ref 3.5–5.3)
RBC # BLD: 3.18 M/UL — LOW (ref 3.8–5.2)
RBC # FLD: 15.6 % — HIGH (ref 10.3–14.5)
RH IG SCN BLD-IMP: POSITIVE — SIGNIFICANT CHANGE UP
SODIUM SERPL-SCNC: 141 MMOL/L — SIGNIFICANT CHANGE UP (ref 135–145)
WBC # BLD: 7.33 K/UL — SIGNIFICANT CHANGE UP (ref 3.8–10.5)
WBC # FLD AUTO: 7.33 K/UL — SIGNIFICANT CHANGE UP (ref 3.8–10.5)

## 2020-08-17 RX ORDER — POLYETHYLENE GLYCOL 3350 17 G/17G
17 POWDER, FOR SOLUTION ORAL
Qty: 0 | Refills: 0 | DISCHARGE
Start: 2020-08-17

## 2020-08-17 RX ORDER — OXYCODONE HYDROCHLORIDE 5 MG/1
1 TABLET ORAL
Qty: 0 | Refills: 0 | DISCHARGE
Start: 2020-08-17

## 2020-08-17 RX ORDER — SENNA PLUS 8.6 MG/1
2 TABLET ORAL
Qty: 0 | Refills: 0 | DISCHARGE
Start: 2020-08-17

## 2020-08-17 RX ORDER — CARBIDOPA AND LEVODOPA 25; 100 MG/1; MG/1
1 TABLET ORAL
Qty: 0 | Refills: 0 | DISCHARGE
Start: 2020-08-17

## 2020-08-17 RX ORDER — ASPIRIN/CALCIUM CARB/MAGNESIUM 324 MG
1 TABLET ORAL
Qty: 0 | Refills: 0 | DISCHARGE
Start: 2020-08-17

## 2020-08-17 RX ORDER — DICLOFENAC SODIUM 75 MG/1
1 TABLET, DELAYED RELEASE ORAL
Qty: 0 | Refills: 0 | DISCHARGE

## 2020-08-17 RX ORDER — ASPIRIN/CALCIUM CARB/MAGNESIUM 324 MG
2 TABLET ORAL
Qty: 0 | Refills: 0 | DISCHARGE

## 2020-08-17 RX ORDER — ACETAMINOPHEN 500 MG
3 TABLET ORAL
Qty: 0 | Refills: 0 | DISCHARGE
Start: 2020-08-17

## 2020-08-17 RX ORDER — CARBIDOPA AND LEVODOPA 25; 100 MG/1; MG/1
1 TABLET ORAL
Qty: 0 | Refills: 0 | DISCHARGE

## 2020-08-17 RX ADMIN — Medication 975 MILLIGRAM(S): at 11:14

## 2020-08-17 RX ADMIN — Medication 325 MILLIGRAM(S): at 05:24

## 2020-08-17 RX ADMIN — Medication 975 MILLIGRAM(S): at 12:00

## 2020-08-17 RX ADMIN — CARBIDOPA AND LEVODOPA 1 TABLET(S): 25; 100 TABLET ORAL at 05:24

## 2020-08-17 RX ADMIN — CARBIDOPA AND LEVODOPA 1 TABLET(S): 25; 100 TABLET ORAL at 17:51

## 2020-08-17 RX ADMIN — CARBIDOPA AND LEVODOPA 1 TABLET(S): 25; 100 TABLET ORAL at 14:21

## 2020-08-17 RX ADMIN — Medication 975 MILLIGRAM(S): at 17:19

## 2020-08-17 RX ADMIN — CARBIDOPA AND LEVODOPA 1 TABLET(S): 25; 100 TABLET ORAL at 22:02

## 2020-08-17 RX ADMIN — CARBIDOPA AND LEVODOPA 1 TABLET(S): 25; 100 TABLET ORAL at 11:14

## 2020-08-17 RX ADMIN — Medication 975 MILLIGRAM(S): at 05:24

## 2020-08-17 RX ADMIN — ESCITALOPRAM OXALATE 10 MILLIGRAM(S): 10 TABLET, FILM COATED ORAL at 11:14

## 2020-08-17 RX ADMIN — Medication 325 MILLIGRAM(S): at 17:20

## 2020-08-17 NOTE — PROGRESS NOTE ADULT - SUBJECTIVE AND OBJECTIVE BOX
Post op Day [ 3]    Patient resting without complaints.  No chest pain, SOB, N/V.    T(C): 36.7 (08-17-20 @ 04:17), Max: 36.7 (08-16-20 @ 15:00)  HR: 84 (08-17-20 @ 04:17) (76 - 108)  BP: 104/54 (08-17-20 @ 04:17) (86/49 - 108/64)  RR: 18 (08-17-20 @ 04:17) (18 - 18)  SpO2: 95% (08-17-20 @ 04:17) (92% - 95%)  Wt(kg): --    Exam:  Sleeping but arousable, No Acute Distress  R hip dsg c/d/i with soft/compressible compartments  Calves Soft, Non-tender bilaterally  +PF/DF/EHL/FHL  SILT  +DP Pulse/PT pulse                        8.9    9.58  )-----------( 148      ( 16 Aug 2020 06:33 )             27.3    08-16    141  |  108  |  27<H>  ----------------------------<  107<H>  4.5   |  27  |  0.79    Ca    8.5      16 Aug 2020 06:33  Phos  1.8     08-16  Mg     2.0     08-16

## 2020-08-17 NOTE — PROGRESS NOTE ADULT - PROBLEM SELECTOR PLAN 1
PT/OT-WBAT, post prec  IS  DVT PPx  Pain Control  Continue Current Tx.  Ck am labs  dispo planning    Eben Bolton PA-C  Team Pager: #[phone]

## 2020-08-18 ENCOUNTER — TRANSCRIPTION ENCOUNTER (OUTPATIENT)
Age: 85
End: 2020-08-18

## 2020-08-18 VITALS
TEMPERATURE: 98 F | SYSTOLIC BLOOD PRESSURE: 104 MMHG | DIASTOLIC BLOOD PRESSURE: 57 MMHG | RESPIRATION RATE: 18 BRPM | OXYGEN SATURATION: 97 % | HEART RATE: 86 BPM

## 2020-08-18 PROBLEM — I48.0 PAROXYSMAL ATRIAL FIBRILLATION: Chronic | Status: ACTIVE | Noted: 2019-12-09

## 2020-08-18 PROBLEM — G20 PARKINSON'S DISEASE: Chronic | Status: ACTIVE | Noted: 2019-06-06

## 2020-08-18 PROBLEM — Z95.2 PRESENCE OF PROSTHETIC HEART VALVE: Chronic | Status: ACTIVE | Noted: 2019-12-09

## 2020-08-18 LAB
ANION GAP SERPL CALC-SCNC: 9 MMOL/L — SIGNIFICANT CHANGE UP (ref 5–17)
APPEARANCE UR: ABNORMAL
BACTERIA # UR AUTO: ABNORMAL
BILIRUB UR-MCNC: NEGATIVE — SIGNIFICANT CHANGE UP
BUN SERPL-MCNC: 18 MG/DL — SIGNIFICANT CHANGE UP (ref 7–23)
CALCIUM SERPL-MCNC: 8.6 MG/DL — SIGNIFICANT CHANGE UP (ref 8.4–10.5)
CHLORIDE SERPL-SCNC: 104 MMOL/L — SIGNIFICANT CHANGE UP (ref 96–108)
CO2 SERPL-SCNC: 29 MMOL/L — SIGNIFICANT CHANGE UP (ref 22–31)
COLOR SPEC: SIGNIFICANT CHANGE UP
CREAT SERPL-MCNC: 0.71 MG/DL — SIGNIFICANT CHANGE UP (ref 0.5–1.3)
DIFF PNL FLD: ABNORMAL
EPI CELLS # UR: 0 /HPF — SIGNIFICANT CHANGE UP
GLUCOSE SERPL-MCNC: 95 MG/DL — SIGNIFICANT CHANGE UP (ref 70–99)
GLUCOSE UR QL: NEGATIVE — SIGNIFICANT CHANGE UP
HCT VFR BLD CALC: 32.1 % — LOW (ref 34.5–45)
HGB BLD-MCNC: 10.2 G/DL — LOW (ref 11.5–15.5)
HYALINE CASTS # UR AUTO: 0 /LPF — SIGNIFICANT CHANGE UP (ref 0–2)
KETONES UR-MCNC: NEGATIVE — SIGNIFICANT CHANGE UP
LEUKOCYTE ESTERASE UR-ACNC: ABNORMAL
MCHC RBC-ENTMCNC: 30 PG — SIGNIFICANT CHANGE UP (ref 27–34)
MCHC RBC-ENTMCNC: 31.8 GM/DL — LOW (ref 32–36)
MCV RBC AUTO: 94.4 FL — SIGNIFICANT CHANGE UP (ref 80–100)
NITRITE UR-MCNC: POSITIVE
NRBC # BLD: 0 /100 WBCS — SIGNIFICANT CHANGE UP (ref 0–0)
PH UR: 7 — SIGNIFICANT CHANGE UP (ref 5–8)
PLATELET # BLD AUTO: 184 K/UL — SIGNIFICANT CHANGE UP (ref 150–400)
POTASSIUM SERPL-MCNC: 4.4 MMOL/L — SIGNIFICANT CHANGE UP (ref 3.5–5.3)
POTASSIUM SERPL-SCNC: 4.4 MMOL/L — SIGNIFICANT CHANGE UP (ref 3.5–5.3)
PROT UR-MCNC: NEGATIVE — SIGNIFICANT CHANGE UP
RBC # BLD: 3.4 M/UL — LOW (ref 3.8–5.2)
RBC # FLD: 15 % — HIGH (ref 10.3–14.5)
RBC CASTS # UR COMP ASSIST: 5 /HPF — HIGH (ref 0–4)
SODIUM SERPL-SCNC: 142 MMOL/L — SIGNIFICANT CHANGE UP (ref 135–145)
SP GR SPEC: 1.01 — LOW (ref 1.01–1.02)
UROBILINOGEN FLD QL: NEGATIVE — SIGNIFICANT CHANGE UP
WBC # BLD: 7.41 K/UL — SIGNIFICANT CHANGE UP (ref 3.8–10.5)
WBC # FLD AUTO: 7.41 K/UL — SIGNIFICANT CHANGE UP (ref 3.8–10.5)
WBC UR QL: 245 /HPF — HIGH (ref 0–5)

## 2020-08-18 PROCEDURE — 88311 DECALCIFY TISSUE: CPT

## 2020-08-18 PROCEDURE — 85027 COMPLETE CBC AUTOMATED: CPT

## 2020-08-18 PROCEDURE — 36430 TRANSFUSION BLD/BLD COMPNT: CPT

## 2020-08-18 PROCEDURE — 86901 BLOOD TYPING SEROLOGIC RH(D): CPT

## 2020-08-18 PROCEDURE — 97110 THERAPEUTIC EXERCISES: CPT

## 2020-08-18 PROCEDURE — 80048 BASIC METABOLIC PNL TOTAL CA: CPT

## 2020-08-18 PROCEDURE — 87086 URINE CULTURE/COLONY COUNT: CPT

## 2020-08-18 PROCEDURE — 73501 X-RAY EXAM HIP UNI 1 VIEW: CPT

## 2020-08-18 PROCEDURE — 97166 OT EVAL MOD COMPLEX 45 MIN: CPT

## 2020-08-18 PROCEDURE — 86850 RBC ANTIBODY SCREEN: CPT

## 2020-08-18 PROCEDURE — 97530 THERAPEUTIC ACTIVITIES: CPT

## 2020-08-18 PROCEDURE — 97162 PT EVAL MOD COMPLEX 30 MIN: CPT

## 2020-08-18 PROCEDURE — 88300 SURGICAL PATH GROSS: CPT

## 2020-08-18 PROCEDURE — 82962 GLUCOSE BLOOD TEST: CPT

## 2020-08-18 PROCEDURE — 86900 BLOOD TYPING SEROLOGIC ABO: CPT

## 2020-08-18 PROCEDURE — C1776: CPT

## 2020-08-18 PROCEDURE — 87186 SC STD MICRODIL/AGAR DIL: CPT

## 2020-08-18 PROCEDURE — 84100 ASSAY OF PHOSPHORUS: CPT

## 2020-08-18 PROCEDURE — P9016: CPT

## 2020-08-18 PROCEDURE — 71045 X-RAY EXAM CHEST 1 VIEW: CPT

## 2020-08-18 PROCEDURE — 86923 COMPATIBILITY TEST ELECTRIC: CPT

## 2020-08-18 PROCEDURE — C1889: CPT

## 2020-08-18 PROCEDURE — 97116 GAIT TRAINING THERAPY: CPT

## 2020-08-18 PROCEDURE — 85730 THROMBOPLASTIN TIME PARTIAL: CPT

## 2020-08-18 PROCEDURE — 88305 TISSUE EXAM BY PATHOLOGIST: CPT

## 2020-08-18 PROCEDURE — 83735 ASSAY OF MAGNESIUM: CPT

## 2020-08-18 PROCEDURE — U0003: CPT

## 2020-08-18 PROCEDURE — 85610 PROTHROMBIN TIME: CPT

## 2020-08-18 PROCEDURE — 81001 URINALYSIS AUTO W/SCOPE: CPT

## 2020-08-18 PROCEDURE — C1713: CPT

## 2020-08-18 RX ORDER — CIPROFLOXACIN LACTATE 400MG/40ML
500 VIAL (ML) INTRAVENOUS EVERY 12 HOURS
Refills: 0 | Status: DISCONTINUED | OUTPATIENT
Start: 2020-08-18 | End: 2020-08-18

## 2020-08-18 RX ORDER — CIPROFLOXACIN LACTATE 400MG/40ML
1 VIAL (ML) INTRAVENOUS
Qty: 0 | Refills: 0 | DISCHARGE
Start: 2020-08-18

## 2020-08-18 RX ADMIN — ESCITALOPRAM OXALATE 10 MILLIGRAM(S): 10 TABLET, FILM COATED ORAL at 10:07

## 2020-08-18 RX ADMIN — Medication 325 MILLIGRAM(S): at 06:05

## 2020-08-18 RX ADMIN — Medication 325 MILLIGRAM(S): at 17:08

## 2020-08-18 RX ADMIN — CARBIDOPA AND LEVODOPA 1 TABLET(S): 25; 100 TABLET ORAL at 17:08

## 2020-08-18 RX ADMIN — Medication 975 MILLIGRAM(S): at 06:35

## 2020-08-18 RX ADMIN — CARBIDOPA AND LEVODOPA 1 TABLET(S): 25; 100 TABLET ORAL at 13:20

## 2020-08-18 RX ADMIN — CARBIDOPA AND LEVODOPA 1 TABLET(S): 25; 100 TABLET ORAL at 10:07

## 2020-08-18 RX ADMIN — Medication 975 MILLIGRAM(S): at 06:05

## 2020-08-18 RX ADMIN — Medication 1 TABLET(S): at 17:08

## 2020-08-18 RX ADMIN — CARBIDOPA AND LEVODOPA 1 TABLET(S): 25; 100 TABLET ORAL at 06:05

## 2020-08-18 NOTE — DISCHARGE NOTE NURSING/CASE MANAGEMENT/SOCIAL WORK - PATIENT PORTAL LINK FT
You can access the FollowMyHealth Patient Portal offered by [facility] by registering at the following website: [email or web address]. By joining [employer]’s FollowMyHealth portal, you will also be able to view your health information using other applications (apps) compatible with our system.

## 2020-08-18 NOTE — PROGRESS NOTE ADULT - ASSESSMENT
85 F Status post total replacement of right hip    PT/OT, WBAT RLE, posterior precautions  IS  Pain Control  DVT PPx  Continue Current Tx  Ck am labs  dispo planning for [facility]

## 2020-08-20 LAB
-  AMIKACIN: SIGNIFICANT CHANGE UP
-  AMOXICILLIN/CLAVULANIC ACID: SIGNIFICANT CHANGE UP
-  AMPICILLIN/SULBACTAM: SIGNIFICANT CHANGE UP
-  AMPICILLIN: SIGNIFICANT CHANGE UP
-  AZTREONAM: SIGNIFICANT CHANGE UP
-  CEFAZOLIN: SIGNIFICANT CHANGE UP
-  CEFEPIME: SIGNIFICANT CHANGE UP
-  CEFOXITIN: SIGNIFICANT CHANGE UP
-  CEFTRIAXONE: SIGNIFICANT CHANGE UP
-  CIPROFLOXACIN: SIGNIFICANT CHANGE UP
-  ERTAPENEM: SIGNIFICANT CHANGE UP
-  GENTAMICIN: SIGNIFICANT CHANGE UP
-  IMIPENEM: SIGNIFICANT CHANGE UP
-  LEVOFLOXACIN: SIGNIFICANT CHANGE UP
-  MEROPENEM: SIGNIFICANT CHANGE UP
-  NITROFURANTOIN: SIGNIFICANT CHANGE UP
-  PIPERACILLIN/TAZOBACTAM: SIGNIFICANT CHANGE UP
-  TIGECYCLINE: SIGNIFICANT CHANGE UP
-  TOBRAMYCIN: SIGNIFICANT CHANGE UP
-  TRIMETHOPRIM/SULFAMETHOXAZOLE: SIGNIFICANT CHANGE UP
CULTURE RESULTS: SIGNIFICANT CHANGE UP
METHOD TYPE: SIGNIFICANT CHANGE UP
ORGANISM # SPEC MICROSCOPIC CNT: SIGNIFICANT CHANGE UP
ORGANISM # SPEC MICROSCOPIC CNT: SIGNIFICANT CHANGE UP
SPECIMEN SOURCE: SIGNIFICANT CHANGE UP
SURGICAL PATHOLOGY STUDY: SIGNIFICANT CHANGE UP

## 2020-08-28 ENCOUNTER — APPOINTMENT (OUTPATIENT)
Dept: ORTHOPEDIC SURGERY | Facility: CLINIC | Age: 85
End: 2020-08-28
Payer: MEDICARE

## 2020-08-28 VITALS
HEIGHT: 64.5 IN | HEART RATE: 83 BPM | BODY MASS INDEX: 21.93 KG/M2 | SYSTOLIC BLOOD PRESSURE: 130 MMHG | TEMPERATURE: 97.8 F | DIASTOLIC BLOOD PRESSURE: 73 MMHG | WEIGHT: 130 LBS

## 2020-08-28 PROCEDURE — 73502 X-RAY EXAM HIP UNI 2-3 VIEWS: CPT | Mod: 26,RT

## 2020-08-28 PROCEDURE — 99024 POSTOP FOLLOW-UP VISIT: CPT

## 2020-08-28 NOTE — HISTORY OF PRESENT ILLNESS
[Clean/Dry/Intact] : clean, dry and intact [Healed] : healed [Neuro Intact] : an unremarkable neurological exam [Vascular Intact] : ~T peripheral vascular exam normal [Negative Jayne's] : maneuvers demonstrated a negative Jayne's sign [Staples Removed] : staples were removed [Chills] : no chills [Constipation] : no constipation [Diarrhea] : no diarrhea [Dysuria] : no dysuria [Fever] : no fever [Nausea] : no nausea [Vomiting] : no vomiting [Erythema] : not erythematous [Discharge] : absent of discharge [Swelling] : not swollen [Dehiscence] : not dehisced [de-identified] : s/p right THR 8/14/20. [de-identified] : Ms. GARIMA ADAMS is a 85 year female who returns to office status right THR 8/14/20.\par Level of pain on scale of 1-10 is 0 out of 10.\par Home care PT going very well overall. Will start outpatient PT next week.\par Taking Tylenol PRN pain.\par Also taking Aspirin for DVT prophylaxis.\par Denies fever, chills, nausea, vomiting, constipation, diarrhea, incision site infection/drainage.\par All review of systems, family history, social history, surgical history, past medical history, medications, and allergies not previously stated as positive are negative. They were reviewed by me today with the patient and documented accordingly. [de-identified] : Incision is clean, dry, and intact, with no signs of active bleeding, erythema, or infection. Patient has good active and passive range of motion of her right hip, able to flex to 100 degrees and abduct to 45 degrees at this time. [de-identified] : AP and lateral views of her right hip show status post right hip replacement in excellent position and alignment. [de-identified] : Patient is doing excellently status post right hip replacement. Her pain is well controlled and she is currently functioning very well. She will begin outpatient PT next week and will follow up in office for repeat x-rays of her right hip in 4 weeks.

## 2020-09-09 NOTE — PROGRESS NOTE ADULT - ASSESSMENT
84 year old  Female with hx of PAF not on AC (on asa), breast mass, parkinsons dz, GERD and stress incontinence who presents s/p mechanical fall with R sided hip pain with noted R impacted femoral neck fracture.     >R femoral neck fracture/ s/p sx/ pod 1   -R hip/ pain controlled    -pt / wt bearing as per ortho     >Parkinson dz  - c/w sinemet po 4x day  - pt follows with neurology as an outpt     >PAF  - Not on AC takes asa 162mg po 3x a week -held for procedure to be restarted thereafter  - c/w metoprolol er 25mg po qd with parameters  -cleared by cardio for sx     >Left breast mass  [facility]  - pt to manage and c/w work up as an outpt with pmd    >Stress Incontinence/ stable   - pt has been having this worked up as an outpt with GYN  - c/w female catheter     >GERD  - c/w protonix 40mg po qd    >DVT ppx  - lovenox     >dispo: medically stable. awaiting prior auth for tracy
84 year old  Female with hx of PAF not on AC (on asa), breast mass, parkinsons dz, GERD and stress incontinence who presents s/p mechanical fall with R sided hip pain with noted R impacted femoral neck fracture.     >R femoral neck fracture/ s/p sx/ pod 1   -R hip/ pain controlled    -pt / wt bearing as per ortho     >Parkinson dz  - c/w sinemet po 4x day  - pt follows with neurology as an outpt     >PAF  - Not on AC takes asa 162mg po 3x a week -held for procedure to be restarted thereafter  - c/w metoprolol er 25mg po qd with parameters  -cleared by cardio for sx     >Left breast mass  - Noted on pmd visit from [facility]  - pt to manage and c/w work up as an outpt with pmd    >Stress Incontinence/ stable   - pt has been having this worked up as an outpt with GYN  - c/w female catheter     >GERD  - c/w protonix 40mg po qd    >DVT ppx  - lovenox     >dispo: possible dc to rehab in am if medically stable.
84 year old  Female with hx of PAF not on AC (on asa), breast mass, parkinsons dz, GERD and stress incontinence who presents s/p mechanical fall with R sided hip pain with noted R impacted femoral neck fracture.     >R femoral neck fracture/ s/p sx/ pod 1   -R hip/ pain controlled    -pt / wt bearing as per ortho     >Parkinson dz  - c/w sinemet po 4x day  - pt follows with neurology as an outpt     >PAF  - Not on AC takes asa 162mg po 3x a week -held for procedure to be restarted thereafter  - c/w metoprolol er 25mg po qd with parameters  -cleared by cardio for sx     >Left breast mass  - Noted on pmd visit from [facility]  - pt to manage and c/w work up as an outpt with pmd    >Stress Incontinence/ stable   - pt has been having this worked up as an outpt with GYN  - c/w female catheter     >GERD  - c/w protonix 40mg po qd    >DVT ppx  - lovenox
84 year old  Female with hx of PAF not on AC (on asa), breast mass, parkinsons dz, GERD and stress incontinence who presents s/p mechanical fall with R sided hip pain with noted R impacted femoral neck fracture.     R femoral neck fracture/ s/p sx/ pod 1   - R hip/ pain controlled    -pt / wt bearing as per ortho     Parkinson dz  - c/w sinemet po 4x day  - pt follows with neurology as an outpt     PAF  - Not on AC takes asa 162mg po 3x a week -held for procedure to be restarted thereafter  - c/w metoprolol er 25mg po qd with parameters  -cleared by cardio for sx     Left breast mass  - [facility]  - pt to manage and c/w work up as an outpt with pmd    Stress Incontinence/ stable   - pt has been having this worked up as an outpt with GYN  - c/w female catheter     GERD  - c/w protonix 40mg po qd      DVT ppx  - lovenox
84 year old  Female with hx of PAF not on AC (on asa), breast mass, parkinsons dz, GERD and stress incontinence who presents s/p mechanical fall with R sided hip pain with noted R impacted femoral neck fracture.     R femoral neck fracture  - R hip/ pain controlled    - NPO for sx today   - Pt at baseline has >4METS score   -EKG completed NSR       Parkinsons dz  - c/w sinemet po 4x day  - pt follows with neurology as an outpt     PAF  - Not on AC takes asa 162mg po 3x a week -held for procedure to be restarted thereafter  - c/w metoprolol er 25mg po qd with parameters  -cleared by cardio for sx     Left breast mass  - Noted on pmd visit from [facility]  - pt to manage and c/w work up as an outpt with pmd    Stress Incontinence  - pt has been having this worked up as an outpt with GYN  - c/w female catheter     GERD  - c/w protonix 40mg po qd      DVT ppx  - Held due to pending sx today
.

## 2020-09-29 ENCOUNTER — APPOINTMENT (OUTPATIENT)
Dept: ORTHOPEDIC SURGERY | Facility: CLINIC | Age: 85
End: 2020-09-29
Payer: MEDICARE

## 2020-09-29 DIAGNOSIS — Z96.649 PRESENCE OF UNSPECIFIED ARTIFICIAL HIP JOINT: ICD-10-CM

## 2020-09-29 PROCEDURE — 72170 X-RAY EXAM OF PELVIS: CPT

## 2020-09-29 NOTE — HISTORY OF PRESENT ILLNESS
[Doing Well] : is doing well [Excellent Pain Control] : has excellent pain control [de-identified] : s/p right THR 8/14/20.  [de-identified] : Ms. GARIMA ADAMS is a 85 year female who returns to office status right THR 8/14/20.\par Level of pain on scale of 1-10 is 0 out of 10.\par Outpatient PT going very well overall. \par She still uses a walker from time to time to help with ambulation.\par Denies fever, chills, nausea, vomiting, constipation, diarrhea, incision site infection/drainage.\par All review of systems, family history, social history, surgical history, past medical history, medications, and allergies not previously stated as positive are negative. They were reviewed by me today with the patient and documented accordingly. [de-identified] : She is walking very well she does use a walker and cane because of her Parkinson's disorder but she is having no hip pain she can flex her hip past 110 degrees she can extend fully she can externally rotate about 45 degrees at this time.  At this time she is doing very well and slowly resuming her regular activities and she is continue with physical therapy. [de-identified] : P of the pelvis and lateral of the right hip shows a right hybrid Biomet total hip replacement in good position and well fixed.  The femur was cemented because of the removal of the hardware that was used to fix her fracture.  The cemented blade plate was removed and the hybrid total hip replacement placed. [de-identified] : Doing very well.  Return visit in 4 to 6 months.

## 2020-09-29 NOTE — BEGINNING OF VISIT
[Patient] : patient [Other Location: e.g. School (Enter Location, City,State)___] : at [unfilled], at the time of the visit. [Other Location: e.g. Home (Enter Location, City,State)___] : at [unfilled] [Verbal consent obtained from patient] : the patient, [unfilled]

## 2020-10-07 ENCOUNTER — APPOINTMENT (OUTPATIENT)
Dept: DERMATOLOGY | Facility: CLINIC | Age: 85
End: 2020-10-07

## 2020-10-09 ENCOUNTER — APPOINTMENT (OUTPATIENT)
Dept: DERMATOLOGY | Facility: CLINIC | Age: 85
End: 2020-10-09
Payer: MEDICARE

## 2020-10-09 PROCEDURE — 99214 OFFICE O/P EST MOD 30 MIN: CPT

## 2020-11-12 ENCOUNTER — FORM ENCOUNTER (OUTPATIENT)
Age: 85
End: 2020-11-12

## 2020-12-15 PROBLEM — Z01.419 ENCOUNTER FOR ROUTINE GYNECOLOGICAL EXAMINATION: Status: RESOLVED | Noted: 2017-12-05 | Resolved: 2020-12-15

## 2020-12-23 PROBLEM — N39.0 URINARY TRACT INFECTION IN FEMALE: Status: RESOLVED | Noted: 2020-07-20 | Resolved: 2020-12-23

## 2021-02-02 ENCOUNTER — APPOINTMENT (OUTPATIENT)
Dept: ORTHOPEDIC SURGERY | Facility: CLINIC | Age: 86
End: 2021-02-02
Payer: MEDICARE

## 2021-02-02 NOTE — HISTORY OF PRESENT ILLNESS
[de-identified] : Patient is s/p right THR 8/14/20.\par She is doing very well at this time regarding her right hip.\par She denies any pain or complaints about the right hip.

## 2021-02-09 ENCOUNTER — APPOINTMENT (OUTPATIENT)
Dept: ORTHOPEDIC SURGERY | Facility: CLINIC | Age: 86
End: 2021-02-09
Payer: MEDICARE

## 2021-02-09 VITALS — TEMPERATURE: 97.1 F

## 2021-02-09 DIAGNOSIS — M51.36 OTHER INTERVERTEBRAL DISC DEGENERATION, LUMBAR REGION: ICD-10-CM

## 2021-02-09 PROCEDURE — 99212 OFFICE O/P EST SF 10 MIN: CPT | Mod: 95

## 2021-02-09 PROCEDURE — 72170 X-RAY EXAM OF PELVIS: CPT

## 2021-02-09 PROCEDURE — 99072 ADDL SUPL MATRL&STAF TM PHE: CPT

## 2021-02-09 NOTE — DISCUSSION/SUMMARY
[de-identified] : She is doing very well as far as her right total hip replacement she ambulates well but use a walker because of her balance and her Parkinson's disorder return visit in 1 year or sooner if necessary.

## 2021-02-09 NOTE — PHYSICAL EXAM
[de-identified] : This patient is doing very well with her right total hip replacement.  She still uses a walker but because of Parkinson's she is not having pain in her right hip when she stands and walks on it she is having no discomfort or pain.  Her motion continues to be very good she can flex it to 115+ degrees and she has external rotation of 50 degrees internal rotation 10 degrees can abduct approximately 55 degrees.  At this time she says she is comfortable and she is having no problem with her hip.  She does use her walker for her Parkinson's.\par \par She does have some intermittent low back pain but no major neurologic change.  Her old x-rays of her lumbar spine are reviewed she does have severe degenerative disc disease as described previously. [de-identified] : An AP of the pelvis and a lateral of her right hip shows a right Biomet hybrid total hip replacement it is in good position and well fixed there is no evidence of loosening or osteolysis.  Her opposite left hip femoral head is round and joint space maintained.

## 2021-02-09 NOTE — HISTORY OF PRESENT ILLNESS
[de-identified] : Patient is s/p right THR 8/14/20.\par She is doing very well today regarding her right hip.\par No pain or complaints regarding her right hip.\par She is complaining of some intermittent right sided lower back pain, however.\par She has not taken any medicine for this pain though.\par She is using a walker for ambulation at home.

## 2021-03-02 ENCOUNTER — RESULT CHARGE (OUTPATIENT)
Age: 86
End: 2021-03-02

## 2021-03-02 ENCOUNTER — APPOINTMENT (OUTPATIENT)
Dept: UROGYNECOLOGY | Facility: CLINIC | Age: 86
End: 2021-03-02
Payer: MEDICARE

## 2021-03-02 VITALS
HEIGHT: 64 IN | DIASTOLIC BLOOD PRESSURE: 65 MMHG | WEIGHT: 125 LBS | SYSTOLIC BLOOD PRESSURE: 113 MMHG | BODY MASS INDEX: 21.34 KG/M2

## 2021-03-02 LAB
BILIRUB UR QL STRIP: NEGATIVE
CLARITY UR: NORMAL
COLLECTION METHOD: NORMAL
GLUCOSE UR-MCNC: NEGATIVE
HCG UR QL: 1 EU/DL
HGB UR QL STRIP.AUTO: NEGATIVE
KETONES UR-MCNC: NEGATIVE
LEUKOCYTE ESTERASE UR QL STRIP: NORMAL
NITRITE UR QL STRIP: POSITIVE
PH UR STRIP: 6
PROT UR STRIP-MCNC: NEGATIVE
SP GR UR STRIP: 1.02

## 2021-03-02 PROCEDURE — 51798 US URINE CAPACITY MEASURE: CPT

## 2021-03-02 PROCEDURE — 99072 ADDL SUPL MATRL&STAF TM PHE: CPT

## 2021-03-02 PROCEDURE — 99213 OFFICE O/P EST LOW 20 MIN: CPT | Mod: 25

## 2021-03-02 NOTE — ASSESSMENT
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 Continue clobetasol to vulva and see gyn onc for potential vulvar biopsy/excision. She understood concern for underlying malignancy. For the OAB, we reviewed options. She desires bladder botox 100 units injections. Risks such as allergy, spread, weakness, muscle weakness, difficulty swallowing or breathing,  UTIs, hematuria, or urinary retention requiring cathterization reviewed and she will like to proceed. RTO for 100 units botox to bladder. Aware she will be called for periprocedure abx prophylaxis. She will also see her PMD soon as she is due, and needs to sent in her FOBT she was provided. Aware for concern for GI tract malignancy if there is blood in stool, however rare red streak more likely to be related to fissure vs hemarrhoid when passing constipated bm. All ques answered.

## 2021-03-02 NOTE — PHYSICAL EXAM
[Chaperone Present] : A chaperone was present in the examining room during all aspects of the physical examination [No Acute Distress] : in no acute distress [Oriented x3] : oriented to person, place, and time [Atrophy] : atrophy [de-identified] : right vulva just lateral and inferior to clitoris subcentimeter whitening of tissue without discharge or bleeding, no other lesions or masses [FreeTextEntry4] : no mass or lesion

## 2021-03-02 NOTE — HISTORY OF PRESENT ILLNESS
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 Underwent 100 units bladder Botox, most recent IMs were 6/2020. Reports OAB with UUI symptoms have returned in past few months, bothersome. No incomplete emptying, no hematuria, no dysuria or recurrent UTIs. Desires botox injections again. Hx Parkinson's under stable control. Chronic constipation managed with diet. Pt had ortho surg for hip within the past year and is doing well, pain improved and she is happy she underwent that procedure. Using clobetasol to vulva, reports itch at times on right side of vulva. My vulvar biopsy in 2018 showed LS, she subsequently had undergone local excision per gyn onc and biopsies revealed LS as well.

## 2021-03-05 LAB — BACTERIA UR CULT: ABNORMAL

## 2021-03-07 ENCOUNTER — NON-APPOINTMENT (OUTPATIENT)
Age: 86
End: 2021-03-07

## 2021-03-08 ENCOUNTER — NON-APPOINTMENT (OUTPATIENT)
Age: 86
End: 2021-03-08

## 2021-04-16 ENCOUNTER — APPOINTMENT (OUTPATIENT)
Dept: UROGYNECOLOGY | Facility: CLINIC | Age: 86
End: 2021-04-16
Payer: MEDICARE

## 2021-04-16 DIAGNOSIS — R30.0 DYSURIA: ICD-10-CM

## 2021-04-16 LAB
BILIRUB UR QL STRIP: NORMAL
CLARITY UR: NORMAL
COLLECTION METHOD: NORMAL
GLUCOSE UR-MCNC: NEGATIVE
HCG UR QL: 1 EU/DL
HGB UR QL STRIP.AUTO: NORMAL
KETONES UR-MCNC: NORMAL
LEUKOCYTE ESTERASE UR QL STRIP: NORMAL
NITRITE UR QL STRIP: POSITIVE
PH UR STRIP: 5.5
PROT UR STRIP-MCNC: 100
SP GR UR STRIP: 1.03

## 2021-04-16 PROCEDURE — 99211 OFF/OP EST MAY X REQ PHY/QHP: CPT | Mod: 25

## 2021-04-16 PROCEDURE — 99072 ADDL SUPL MATRL&STAF TM PHE: CPT

## 2021-04-16 PROCEDURE — 51701 INSERT BLADDER CATHETER: CPT

## 2021-04-30 ENCOUNTER — APPOINTMENT (OUTPATIENT)
Dept: UROGYNECOLOGY | Facility: CLINIC | Age: 86
End: 2021-04-30
Payer: MEDICARE

## 2021-04-30 PROCEDURE — 99072 ADDL SUPL MATRL&STAF TM PHE: CPT

## 2021-04-30 PROCEDURE — 52287 CYSTOSCOPY CHEMODENERVATION: CPT

## 2021-05-12 ENCOUNTER — APPOINTMENT (OUTPATIENT)
Dept: GYNECOLOGIC ONCOLOGY | Facility: CLINIC | Age: 86
End: 2021-05-12
Payer: MEDICARE

## 2021-05-12 VITALS
HEART RATE: 86 BPM | BODY MASS INDEX: 21.85 KG/M2 | SYSTOLIC BLOOD PRESSURE: 152 MMHG | DIASTOLIC BLOOD PRESSURE: 87 MMHG | HEIGHT: 64 IN | WEIGHT: 128 LBS

## 2021-05-12 DIAGNOSIS — N90.89 OTHER SPECIFIED NONINFLAMMATORY DISORDERS OF VULVA AND PERINEUM: ICD-10-CM

## 2021-05-12 PROCEDURE — 99072 ADDL SUPL MATRL&STAF TM PHE: CPT

## 2021-05-12 PROCEDURE — 99212 OFFICE O/P EST SF 10 MIN: CPT

## 2021-05-12 NOTE — PHYSICAL EXAM
[Fully active, able to carry on all pre-disease performance without restriction] : Status 0 - Fully active, able to carry on all pre-disease performance without restriction [Abnormal] : External genitalia: Abnormal [Normal] : Vagina: Normal [de-identified] : diffusely atrophic, pale flat white scar to right of clitoris

## 2021-05-12 NOTE — HISTORY OF PRESENT ILLNESS
[FreeTextEntry1] : WLE of the vulva on 12/13/19\par Lichen sclerosus \par \par She feels well. She remains on Clobetastol and is well tolerated. She is s/p Keflex for UTI 3/2021 prescribed by Uro/Gyn. \par \par Denies VB, discharge or new vulvar lesions. Bowel and bladder functioning normally. \par \par Returns today at request of her urogynecologist who was concerned about a new lesion of the vulva.

## 2021-06-02 ENCOUNTER — APPOINTMENT (OUTPATIENT)
Dept: UROGYNECOLOGY | Facility: CLINIC | Age: 86
End: 2021-06-02
Payer: MEDICARE

## 2021-06-02 LAB
BILIRUB UR QL STRIP: NEGATIVE
CLARITY UR: CLEAR
COLLECTION METHOD: NORMAL
GLUCOSE UR-MCNC: NEGATIVE
HCG UR QL: 0.2 EU/DL
HGB UR QL STRIP.AUTO: NEGATIVE
KETONES UR-MCNC: NORMAL
LEUKOCYTE ESTERASE UR QL STRIP: NEGATIVE
NITRITE UR QL STRIP: NEGATIVE
PH UR STRIP: 6
PROT UR STRIP-MCNC: NEGATIVE
SP GR UR STRIP: 1.02

## 2021-06-02 PROCEDURE — 99072 ADDL SUPL MATRL&STAF TM PHE: CPT

## 2021-06-02 PROCEDURE — 51701 INSERT BLADDER CATHETER: CPT

## 2021-06-02 PROCEDURE — 99213 OFFICE O/P EST LOW 20 MIN: CPT | Mod: 25

## 2021-06-02 PROCEDURE — 81003 URINALYSIS AUTO W/O SCOPE: CPT | Mod: QW

## 2021-06-02 PROCEDURE — 51798 US URINE CAPACITY MEASURE: CPT

## 2021-06-02 NOTE — ASSESSMENT
[FreeTextEntry1] : 4 weeks s/p 100 units bladder botox injection.  ml via cath (280 via bladder scan). Dip neg, but will f/u UCx and treat prn UTI. She cannot perform self-cath and declines cath at present. I will f/u with repeat PVR for improvement in 2 weeks. She understood and agreed. Risk of longer term elevated PVRs such as UTIs, hydronephrosis and renal damage reviewed. Risk of botox of retention and UTI also present. She is aware. All ques answered.\par \par Plan:\par [] f/u 2 weeks PVR check\par [] f/u UCx and treat prn UTI

## 2021-06-02 NOTE — HISTORY OF PRESENT ILLNESS
[FreeTextEntry1] : Underwent 100 units bladder botox 4/30/21, presents today for a PVR check. Saw Gyn Onc for LS followup and questionable labial/vulvar lesion, but was cleared for continued clobetasol use for LS management. Today reports great improvement with UUI, no UI, no subj sensation of incomplete emptying. No dysuria or flank pain, no fever or chills. 1 day of pink-tinged urine noted once last weekend. Upon history, reports took diclofenac prior to the botox a few weeks ago as opposed to the nitrofurantoin, thinking it was the prophylactic abx. Never took abx. No abx allergies.

## 2021-06-02 NOTE — PHYSICAL EXAM
[Chaperone Present] : A chaperone was present in the examining room during all aspects of the physical examination [No Acute Distress] : in no acute distress [Oriented x3] : oriented to person, place, and time [Normal Gait] : gait was normal [Post Void Residual ____ml] : post void residual was [unfilled] ml

## 2021-06-02 NOTE — PROCEDURE
[Straight Catheterization] : insertion of a straight catheter [Urinary Tract Infection] : a urinary tract infection [Urinary Retention] : urinary retention [Patient] : the patient [___ Fr Straight Tip] : a [unfilled] in [place] straight tip catheter [None] : there were no complications with the catheter insertion [Culture] : culture [No Complications] : no complications [Tolerated Well] : the patient tolerated the procedure well [Post procedure instructions and information given] : Post procedure instructions and information were given and reviewed with patient. [1] : 1 [FreeTextEntry1] : dip neg, cathed to check accuracy of bladder scan pvr

## 2021-06-04 LAB — BACTERIA UR CULT: NORMAL

## 2021-06-08 ENCOUNTER — APPOINTMENT (OUTPATIENT)
Dept: UROGYNECOLOGY | Facility: CLINIC | Age: 86
End: 2021-06-08
Payer: MEDICARE

## 2021-06-08 ENCOUNTER — NON-APPOINTMENT (OUTPATIENT)
Age: 86
End: 2021-06-08

## 2021-06-08 DIAGNOSIS — R39.9 UNSPECIFIED SYMPTOMS AND SIGNS INVOLVING THE GENITOURINARY SYSTEM: ICD-10-CM

## 2021-06-08 PROCEDURE — 51701 INSERT BLADDER CATHETER: CPT

## 2021-06-08 PROCEDURE — 99072 ADDL SUPL MATRL&STAF TM PHE: CPT

## 2021-06-08 PROCEDURE — 99212 OFFICE O/P EST SF 10 MIN: CPT | Mod: 25

## 2021-06-08 NOTE — ASSESSMENT
[FreeTextEntry1] : Improving  ml today s/p 100 units bladder botox injections just over 1 month ago (was 200 ml last visit). F/U UCx and treat prn UTI. F/U urine, continue hydration. We discussed no gross pathology on cysto during botox recently. F/U cytol as well. If UCx neg, then consider further eval for heamturia if persists. F/U in 1-2 months or prn. All ques answered.

## 2021-06-08 NOTE — PHYSICAL EXAM
[Chaperone Present] : A chaperone was present in the examining room during all aspects of the physical examination [No Acute Distress] : in no acute distress [Oriented x3] : oriented to person, place, and time [None] : no CVA tenderness [Tenderness] : ~T no ~M abdominal tenderness observed [Distended] : not distended

## 2021-06-08 NOTE — HISTORY OF PRESENT ILLNESS
[FreeTextEntry1] : Mild gross hematuria intermittently x 1-2 days, no dysuria, no flank pain, no fever/chills. Came in for concern for UTI. Underwent 100 untis bladder botox few weeks ago, last PVR check was 200 ml via cath. She feels emptying is improving today.

## 2021-06-08 NOTE — PROCEDURE
[Straight Catheterization] : insertion of a straight catheter [Urinary Tract Infection] : a urinary tract infection [Hematuria] : hematuria [Patient] : the patient [___ Fr Straight Tip] : a [unfilled] in Ukrainian straight tip catheter [None] : there were no complications with the catheter insertion [Clear] : clear [Other: ___] : [unfilled] [Culture] : culture [No Complications] : no complications [Tolerated Well] : the patient tolerated the procedure well [Post procedure instructions and information given] : Post procedure instructions and information were given and reviewed with patient. [1] : 1 [FreeTextEntry1] : cathed to obtain uncontam specimen and PVR - 100 ml

## 2021-06-09 LAB — URINE CYTOLOGY: NORMAL

## 2021-06-14 LAB — BACTERIA UR CULT: ABNORMAL

## 2021-06-23 ENCOUNTER — APPOINTMENT (OUTPATIENT)
Dept: UROGYNECOLOGY | Facility: CLINIC | Age: 86
End: 2021-06-23
Payer: MEDICARE

## 2021-06-23 PROCEDURE — 99212 OFFICE O/P EST SF 10 MIN: CPT

## 2021-06-23 PROCEDURE — 99072 ADDL SUPL MATRL&STAF TM PHE: CPT

## 2021-06-23 NOTE — HISTORY OF PRESENT ILLNESS
[FreeTextEntry1] : Pt is s/p 100 units of intradetrusor botox on 4/30/21.  She had elevated PVR initially (200ml) , last visit PVR was 100ml but she had +UTI.  She was treated with macrobid for >100K enterococcus faecalis UTI on 6/8.  Today she reports doing well.  She denies any UTI symptoms, she reports improvement in UUI, no gross hematuria and no subjective feelings of incomplete emptying.  PVR today 143ml via bladder scan.  We discussed double voiding and she was advised to f/u if she becomes symptomatic for UTI.  Instructed to call with any questions or concerns and she verbalizes understanding.

## 2021-07-27 RX ORDER — CLOBETASOL PROPIONATE 0.5 MG/G
0.05 OINTMENT TOPICAL DAILY
Qty: 1 | Refills: 2 | Status: ACTIVE | COMMUNITY
Start: 2019-10-21 | End: 1900-01-01

## 2021-08-03 ENCOUNTER — APPOINTMENT (OUTPATIENT)
Dept: UROGYNECOLOGY | Facility: CLINIC | Age: 86
End: 2021-08-03
Payer: MEDICARE

## 2021-08-03 DIAGNOSIS — N39.0 URINARY TRACT INFECTION, SITE NOT SPECIFIED: ICD-10-CM

## 2021-08-03 PROCEDURE — 51701 INSERT BLADDER CATHETER: CPT

## 2021-08-04 LAB
APPEARANCE: ABNORMAL
BACTERIA: ABNORMAL
BILIRUBIN URINE: NEGATIVE
BLOOD URINE: NEGATIVE
COLOR: YELLOW
GLUCOSE QUALITATIVE U: NEGATIVE
HYALINE CASTS: 1 /LPF
KETONES URINE: NEGATIVE
LEUKOCYTE ESTERASE URINE: ABNORMAL
MICROSCOPIC-UA: NORMAL
NITRITE URINE: NEGATIVE
PH URINE: 6.5
PROTEIN URINE: ABNORMAL
RED BLOOD CELLS URINE: 2 /HPF
SPECIFIC GRAVITY URINE: 1.02
SQUAMOUS EPITHELIAL CELLS: 0 /HPF
UROBILINOGEN URINE: NORMAL
WHITE BLOOD CELLS URINE: 45 /HPF

## 2021-08-24 ENCOUNTER — APPOINTMENT (OUTPATIENT)
Dept: UROGYNECOLOGY | Facility: CLINIC | Age: 86
End: 2021-08-24
Payer: MEDICARE

## 2021-08-24 PROCEDURE — 51701 INSERT BLADDER CATHETER: CPT

## 2021-08-24 PROCEDURE — 99214 OFFICE O/P EST MOD 30 MIN: CPT | Mod: 25

## 2021-08-24 NOTE — PROCEDURE
[Straight Catheterization] : insertion of a straight catheter [Urinary Retention] : urinary retention [___ Fr Straight Tip] : a [unfilled] in [place] citizen straight tip catheter [None] : there were no complications with the catheter insertion [Cloudy] : cloudy [Culture] : culture [Urinalysis] : urinalysis [No Complications] : no complications [FreeTextEntry9] : 100ml

## 2021-08-25 LAB
APPEARANCE: CLEAR
BACTERIA: ABNORMAL
BILIRUBIN URINE: NEGATIVE
BLOOD URINE: NEGATIVE
COLOR: YELLOW
GLUCOSE QUALITATIVE U: NEGATIVE
HYALINE CASTS: 0 /LPF
KETONES URINE: NEGATIVE
LEUKOCYTE ESTERASE URINE: ABNORMAL
MICROSCOPIC-UA: NORMAL
NITRITE URINE: NEGATIVE
PH URINE: 6
PROTEIN URINE: NEGATIVE
RED BLOOD CELLS URINE: 1 /HPF
SPECIFIC GRAVITY URINE: 1.02
SQUAMOUS EPITHELIAL CELLS: 0 /HPF
UROBILINOGEN URINE: NORMAL
WHITE BLOOD CELLS URINE: 26 /HPF

## 2021-08-31 ENCOUNTER — APPOINTMENT (OUTPATIENT)
Dept: DERMATOLOGY | Facility: CLINIC | Age: 86
End: 2021-08-31
Payer: MEDICARE

## 2021-08-31 PROCEDURE — 99213 OFFICE O/P EST LOW 20 MIN: CPT | Mod: 25

## 2021-08-31 PROCEDURE — 17000 DESTRUCT PREMALG LESION: CPT

## 2021-08-31 PROCEDURE — 17003 DESTRUCT PREMALG LES 2-14: CPT

## 2021-09-03 ENCOUNTER — APPOINTMENT (OUTPATIENT)
Dept: UROGYNECOLOGY | Facility: CLINIC | Age: 86
End: 2021-09-03
Payer: MEDICARE

## 2021-09-03 PROCEDURE — 99213 OFFICE O/P EST LOW 20 MIN: CPT

## 2021-09-06 NOTE — HISTORY OF PRESENT ILLNESS
[FreeTextEntry1] : 87 yo female with Parkinson's presents for f/u.  She has a hx of OAB which has been treated several times with bladder botox, most recently on 4/30/21.  She also has a hx of recurrent UTI's, dates as followed: 3/2/21, 4/16/21, 6/8/21, 8/3/21, and most recently 8/24/21 treated with Ceftin.  She finished last dose this morning.  Pt states that she is feeling good and denies fever, chills, dysuria, hematuria, cloudy or malodorous urine.  She feels like she is emptying well.  Reviewed fluid intake and drinks approximately 28 oz overall daily, coffee and water. \par \par In office PVR= 15\par Rather than wait 2-3 weeks for her appointment with cardiologist, advised her to call to consult with him regarding use of vaginal estrogen for atrophic vaginitis/ UTI ppx\par Rx for methenamine sent in the interim \par Discussed double voiding if she feels like she is not emptying well\par Discussed increasing fluid intake to 40-50 oz daily \par F/U in 4 weeks, or sooner if needed\par Instructed to call with any questions or concerns and she verbalizes understanding

## 2021-09-08 NOTE — H&P PST ADULT - BREASTS
The patient calls in with questions. The patient wants to know if she should get the COVID-19 Vaccine.       The patient is also asking if Dr. Erick Ormond wants the patient to have a follow up with her cardiologist.    The patient is also requesting a refill of  metoprolol tartrate (LOPRESSOR) 25 MG tablet detailed exam

## 2021-09-15 ENCOUNTER — NON-APPOINTMENT (OUTPATIENT)
Age: 86
End: 2021-09-15

## 2021-10-11 ENCOUNTER — APPOINTMENT (OUTPATIENT)
Dept: DERMATOLOGY | Facility: CLINIC | Age: 86
End: 2021-10-11

## 2021-10-15 ENCOUNTER — NON-APPOINTMENT (OUTPATIENT)
Age: 86
End: 2021-10-15

## 2021-10-19 ENCOUNTER — APPOINTMENT (OUTPATIENT)
Dept: UROGYNECOLOGY | Facility: CLINIC | Age: 86
End: 2021-10-19
Payer: MEDICARE

## 2021-10-19 VITALS — DIASTOLIC BLOOD PRESSURE: 71 MMHG | TEMPERATURE: 98.1 F | SYSTOLIC BLOOD PRESSURE: 131 MMHG

## 2021-10-19 DIAGNOSIS — L90.0 LICHEN SCLEROSUS ET ATROPHICUS: ICD-10-CM

## 2021-10-19 DIAGNOSIS — N30.00 ACUTE CYSTITIS W/OUT HEMATURIA: ICD-10-CM

## 2021-10-19 DIAGNOSIS — N36.42 INTRINSIC SPHINCTER DEFICIENCY (ISD): ICD-10-CM

## 2021-10-19 DIAGNOSIS — N95.2 POSTMENOPAUSAL ATROPHIC VAGINITIS: ICD-10-CM

## 2021-10-19 DIAGNOSIS — N36.2 URETHRAL CARUNCLE: ICD-10-CM

## 2021-10-19 DIAGNOSIS — R32 UNSPECIFIED URINARY INCONTINENCE: ICD-10-CM

## 2021-10-19 DIAGNOSIS — N39.46 MIXED INCONTINENCE: ICD-10-CM

## 2021-10-19 DIAGNOSIS — R35.1 NOCTURIA: ICD-10-CM

## 2021-10-19 DIAGNOSIS — N39.0 URINARY TRACT INFECTION, SITE NOT SPECIFIED: ICD-10-CM

## 2021-10-19 PROCEDURE — 51701 INSERT BLADDER CATHETER: CPT

## 2021-10-19 PROCEDURE — 99214 OFFICE O/P EST MOD 30 MIN: CPT | Mod: 25

## 2021-10-19 RX ORDER — CEFUROXIME AXETIL 500 MG/1
500 TABLET ORAL
Qty: 14 | Refills: 0 | Status: DISCONTINUED | COMMUNITY
Start: 2021-08-27 | End: 2021-10-19

## 2021-10-19 RX ORDER — NITROFURANTOIN (MONOHYDRATE/MACROCRYSTALS) 25; 75 MG/1; MG/1
100 CAPSULE ORAL TWICE DAILY
Qty: 14 | Refills: 0 | Status: DISCONTINUED | COMMUNITY
Start: 2021-06-14 | End: 2021-10-19

## 2021-10-19 RX ORDER — NITROFURANTOIN (MONOHYDRATE/MACROCRYSTALS) 25; 75 MG/1; MG/1
100 CAPSULE ORAL
Qty: 10 | Refills: 0 | Status: DISCONTINUED | COMMUNITY
Start: 2021-04-12 | End: 2021-10-19

## 2021-10-19 RX ORDER — CEFPODOXIME PROXETIL 200 MG/1
200 TABLET, FILM COATED ORAL TWICE DAILY
Qty: 10 | Refills: 0 | Status: DISCONTINUED | COMMUNITY
Start: 2021-04-20 | End: 2021-10-19

## 2021-10-19 RX ORDER — CEPHALEXIN 500 MG/1
500 CAPSULE ORAL
Qty: 14 | Refills: 0 | Status: DISCONTINUED | COMMUNITY
Start: 2021-07-16 | End: 2021-10-19

## 2021-10-19 RX ORDER — AMPICILLIN 500 MG/1
500 CAPSULE ORAL
Qty: 20 | Refills: 0 | Status: DISCONTINUED | COMMUNITY
Start: 2020-07-24 | End: 2021-10-19

## 2021-10-19 RX ORDER — CEPHALEXIN 500 MG/1
500 CAPSULE ORAL
Qty: 14 | Refills: 0 | Status: DISCONTINUED | COMMUNITY
Start: 2021-03-05 | End: 2021-10-19

## 2021-10-19 RX ORDER — METHENAMINE HIPPURATE 1 G/1
1 TABLET ORAL DAILY
Qty: 30 | Refills: 3 | Status: DISCONTINUED | COMMUNITY
Start: 2021-09-03 | End: 2021-10-19

## 2021-10-19 RX ORDER — NITROFURANTOIN MACROCRYSTALS 100 MG/1
100 CAPSULE ORAL TWICE DAILY
Qty: 10 | Refills: 0 | Status: DISCONTINUED | COMMUNITY
Start: 2020-06-12 | End: 2021-10-19

## 2021-10-19 RX ORDER — NITROFURANTOIN (MONOHYDRATE/MACROCRYSTALS) 25; 75 MG/1; MG/1
100 CAPSULE ORAL TWICE DAILY
Qty: 10 | Refills: 0 | Status: DISCONTINUED | COMMUNITY
Start: 2021-08-06 | End: 2021-10-19

## 2021-10-19 NOTE — HISTORY OF PRESENT ILLNESS
[FreeTextEntry1] : Last bladder botox April 2021. Presents today for ? vag spotting vs hematuria since stopping methenamine. Not on any abx for any reason at present. No other symptoms such as dysuria. No subj incomplete emptying of bladder. Diarrhea at times and feels protrusion LLQ - has appt soon for eval for ? hernia. Cystos most recently 2021 botox time neg/clear.

## 2021-10-19 NOTE — PHYSICAL EXAM
[Chaperone Present] : A chaperone was present in the examining room during all aspects of the physical examination [No Acute Distress] : in no acute distress [Oriented x3] : oriented to person, place, and time [] : 0 [Tenderness] : ~T no ~M abdominal tenderness observed [Distended] : not distended [de-identified] : r vulvar erythema, no discrete cyst / mass, no active bleeding [FreeTextEntry4] : no mass or tenderness, no bleeding

## 2021-10-19 NOTE — ASSESSMENT
[FreeTextEntry1] : f/u ucx and treat prn. no methenamine, no abx as of now. restart uti prophyl after today's ucx prn. see gyn for right-sided vulvar lesion / eval. rto prn.

## 2021-10-19 NOTE — PROCEDURE
[Straight Catheterization] : insertion of a straight catheter [Urinary Tract Infection] : a urinary tract infection [Hematuria] : hematuria [Urinary Frequency] : urinary frequency [Patient] : the patient [___ Fr Straight Tip] : a [unfilled] in Estonian straight tip catheter [None] : there were no complications with the catheter insertion [Cloudy] : cloudy [Culture] : culture [No Complications] : no complications [Tolerated Well] : the patient tolerated the procedure well [Post procedure instructions and information given] : Post procedure instructions and information were given and reviewed with patient. [1] : 1 [FreeTextEntry1] : cathed to obtain uncontam specimen and pvr - 50 ml

## 2021-10-20 ENCOUNTER — APPOINTMENT (OUTPATIENT)
Dept: UROGYNECOLOGY | Facility: CLINIC | Age: 86
End: 2021-10-20

## 2021-10-27 LAB — BACTERIA UR CULT: ABNORMAL

## 2021-10-29 ENCOUNTER — APPOINTMENT (OUTPATIENT)
Dept: UROGYNECOLOGY | Facility: CLINIC | Age: 86
End: 2021-10-29

## 2021-11-01 ENCOUNTER — APPOINTMENT (OUTPATIENT)
Dept: ORTHOPEDIC SURGERY | Facility: CLINIC | Age: 86
End: 2021-11-01
Payer: MEDICARE

## 2021-11-01 VITALS — WEIGHT: 128 LBS | HEIGHT: 64 IN | BODY MASS INDEX: 21.85 KG/M2

## 2021-11-01 DIAGNOSIS — Z96.641 PRESENCE OF RIGHT ARTIFICIAL HIP JOINT: ICD-10-CM

## 2021-11-01 DIAGNOSIS — Z87.81 PERSONAL HISTORY OF (HEALED) TRAUMATIC FRACTURE: ICD-10-CM

## 2021-11-01 PROCEDURE — 99213 OFFICE O/P EST LOW 20 MIN: CPT

## 2021-11-01 PROCEDURE — 72170 X-RAY EXAM OF PELVIS: CPT

## 2021-11-01 NOTE — HISTORY OF PRESENT ILLNESS
[de-identified] : Patient is s/p right THR 8/14/20.\par She is doing very well today regarding her right hip.\par No pain or complaints regarding her right hip.\par She uses a walker to help with balance secondary to Parkinson's Disease.

## 2021-11-01 NOTE — DISCUSSION/SUMMARY
[de-identified] : This patient is doing very well as far as her hips are concerned her probably continues to be her Parkinson's disorder but she has no pain and is ambulating well as far as her hips.  Her problem with ambulation is a balance and the Parkinson's disorder.

## 2021-11-01 NOTE — PHYSICAL EXAM
[de-identified] : Patient's main problem at this time continues to be her Parkinson's disorder.  Again walking because of her Parkinson's is still not easy but she is able to full weight-bear and has no pain in her hip when doing it.  Her motion in her hip remains a very good.  Her left hip has normal range of motion in her right has a very good motion.  She has a flexion right hip 120 degrees left hip 130 degrees, abduction right hip 65 degrees left hip 75 degrees, abduction right hip 10 degrees left hip 10 degrees, external rotation of the right hip 55 degrees left hip 65 degrees, internal rotation right hip 10 degrees left hip 10 degrees.  She has no pain on the left or the right.  \par \par  [de-identified] : An AP of the pelvis and a lateral of the patient's her right hip shows a left femoral head round joint space maintained her right hip shows a cemented femoral component and a hybrid total hip replacement it is in good position it is well fixed with excellent cement fixation.  The cup is in excellent position and is porous ingrowth cup.

## 2021-11-03 LAB — BACTERIA UR CULT: ABNORMAL

## 2021-11-15 ENCOUNTER — APPOINTMENT (OUTPATIENT)
Dept: UROGYNECOLOGY | Facility: CLINIC | Age: 86
End: 2021-11-15
Payer: MEDICARE

## 2021-11-15 DIAGNOSIS — R33.9 RETENTION OF URINE, UNSPECIFIED: ICD-10-CM

## 2021-11-15 PROCEDURE — 99212 OFFICE O/P EST SF 10 MIN: CPT

## 2021-11-15 NOTE — HISTORY OF PRESENT ILLNESS
[FreeTextEntry1] : Pt presents to office with c/o feelings of incomplete emptying for one day last week.  Notes that she felt the urge to urinate and felt like only small amounts were coming out.  She then drank "a lot" of water and later that night was able to empty.  She denies subjective feelings of incomplete emptying since then.  She denies any dysuria, blood in the urine, back pain, fever or chills.  She is taking keflex ppx.  Bladder scan PVR today 8ml.  We discussed double voiding and relaxation techniques.  Also discussed staying hydrated and if she has this sensation again, she can try to sit in a bath tub of warm water to relax and urinate in the bath.  She verbalizes understanding.  Continue keflex pppx and f/u PRN.  Instructed to call with any questions or concerns.

## 2021-11-29 ENCOUNTER — APPOINTMENT (OUTPATIENT)
Dept: GASTROENTEROLOGY | Facility: CLINIC | Age: 86
End: 2021-11-29
Payer: MEDICARE

## 2021-11-29 VITALS
DIASTOLIC BLOOD PRESSURE: 65 MMHG | SYSTOLIC BLOOD PRESSURE: 105 MMHG | HEIGHT: 64 IN | WEIGHT: 122 LBS | TEMPERATURE: 97.5 F | BODY MASS INDEX: 20.83 KG/M2

## 2021-11-29 DIAGNOSIS — R12 HEARTBURN: ICD-10-CM

## 2021-11-29 DIAGNOSIS — G20 PARKINSON'S DISEASE: ICD-10-CM

## 2021-11-29 DIAGNOSIS — I48.0 PAROXYSMAL ATRIAL FIBRILLATION: ICD-10-CM

## 2021-11-29 DIAGNOSIS — R13.10 DYSPHAGIA, UNSPECIFIED: ICD-10-CM

## 2021-11-29 DIAGNOSIS — Z95.2 PRESENCE OF PROSTHETIC HEART VALVE: ICD-10-CM

## 2021-11-29 DIAGNOSIS — Z87.440 PERSONAL HISTORY OF URINARY (TRACT) INFECTIONS: ICD-10-CM

## 2021-11-29 DIAGNOSIS — Z12.11 ENCOUNTER FOR SCREENING FOR MALIGNANT NEOPLASM OF COLON: ICD-10-CM

## 2021-11-29 PROCEDURE — 99204 OFFICE O/P NEW MOD 45 MIN: CPT

## 2021-11-29 RX ORDER — DICLOFENAC SODIUM 50 MG/1
50 TABLET, DELAYED RELEASE ORAL TWICE DAILY
Qty: 60 | Refills: 1 | Status: DISCONTINUED | COMMUNITY
Start: 2020-05-20 | End: 2021-11-29

## 2021-11-29 RX ORDER — CLOBETASOL PROPIONATE 0.5 MG/G
0.05 CREAM TOPICAL
Qty: 60 | Refills: 2 | Status: DISCONTINUED | COMMUNITY
Start: 2018-06-29 | End: 2021-11-29

## 2021-11-29 RX ORDER — SULFAMETHOXAZOLE AND TRIMETHOPRIM 800; 160 MG/1; MG/1
800-160 TABLET ORAL TWICE DAILY
Qty: 6 | Refills: 0 | Status: DISCONTINUED | COMMUNITY
Start: 2021-10-29 | End: 2021-11-29

## 2021-11-29 RX ORDER — DICLOFENAC SODIUM 50 MG/1
50 TABLET, DELAYED RELEASE ORAL TWICE DAILY
Qty: 30 | Refills: 0 | Status: DISCONTINUED | COMMUNITY
Start: 2020-08-04 | End: 2021-11-29

## 2021-11-29 RX ORDER — HYDROCORTISONE 25 MG/G
2.5 OINTMENT TOPICAL
Qty: 20 | Refills: 0 | Status: ACTIVE | COMMUNITY
Start: 2021-09-16

## 2021-11-29 RX ORDER — ACETAMINOPHEN 325 MG
TABLET ORAL
Refills: 0 | Status: DISCONTINUED | COMMUNITY
End: 2021-11-29

## 2021-11-29 RX ORDER — COLD-HOT PACK
EACH MISCELLANEOUS
Refills: 0 | Status: ACTIVE | COMMUNITY

## 2021-11-29 RX ORDER — CHOLECALCIFEROL (VITAMIN D3) 25 MCG
TABLET ORAL
Refills: 0 | Status: ACTIVE | COMMUNITY

## 2021-11-29 RX ORDER — CEPHALEXIN 250 MG/1
250 CAPSULE ORAL
Qty: 30 | Refills: 0 | Status: DISCONTINUED | COMMUNITY
Start: 2021-10-29 | End: 2021-11-29

## 2021-11-30 NOTE — CONSULT LETTER
[FreeTextEntry1] : Dear Dr. Asif Tolbert and Dr. Adrian Lombardi,\par \par I had the pleasure of seeing your patient GARIMA ADAMS in the office today.  My office note is attached. PLEASE READ THE "ASSESSMENT" SECTION OF THE NOTE TO SEE MY IMPRESSION AND PLAN.\par \par Thank you very much for allowing me to participate in the care of your patient.\par \par Sincerely,\par \par Farhat Hutchins M.D., FAC, FACP\par Director, Celiac Program at [facility]  of Medicine\par [employer] at [facility] Practice Director,\[facility] - Gastroenterology/Internal Medicine at [place]\[address]\[place] [address]\par Tel: [phone]\par Email: [email or web address]\par \par \par The attached note has been created using a voice recognition system (Dragon).  There may be some misspellings and typos.  Please call my office if you have any issues or questions.  Patient with one or more new problems requiring additional work-up/treatment.

## 2021-11-30 NOTE — HISTORY OF PRESENT ILLNESS
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  She reports frequent heartburn for which she takes Tums about twice a week.  She has had this for years.  She notes dysphagia with both solids and liquids with choking, which she attributes to her Parkinson's disease.  She denies abdominal pain.  She generally has solid bowel movements once every 1 to 2 days without melena or bright red blood per rectum.  She is often on antibiotics and will get intermittent diarrhea from an antibiotic.  She has seen blood in the toilet bowl without any stool which she believes is from her urine.  The patient has lost 7 pounds over the past 1-1/2 years due to eating less.  She reports that her last colonoscopy was about 20 years ago.  She has not not been hospitalized in the past year.  She has a history of mitral valve replacement and possible atrial fibrillation.

## 2021-11-30 NOTE — PHYSICAL EXAM
[General Appearance - Alert] : alert [General Appearance - In No Acute Distress] : in no acute distress [Neck Appearance] : the appearance of the neck was normal [Neck Cervical Mass (___cm)] : no neck mass was observed [Jugular Venous Distention Increased] : there was no jugular-venous distention [Thyroid Diffuse Enlargement] : the thyroid was not enlarged [Thyroid Nodule] : there were no palpable thyroid nodules [Auscultation Breath Sounds / Voice Sounds] : lungs were clear to auscultation bilaterally [Heart Rate And Rhythm] : heart rate was normal and rhythm regular [Heart Sounds] : normal S1 and S2 [Heart Sounds Gallop] : no gallops [Heart Sounds Pericardial Friction Rub] : no pericardial rub [Systolic grade ___/6] : A grade [unfilled]/6 systolic murmur was heard. [Edema] : there was no peripheral edema [Bowel Sounds] : normal bowel sounds [Abdomen Soft] : soft [Abdomen Tenderness] : non-tender [] : no hepato-splenomegaly [Abdomen Mass (___ Cm)] : no abdominal mass palpated [No CVA Tenderness] : no ~M costovertebral angle tenderness [No Spinal Tenderness] : no spinal tenderness [Oriented To Time, Place, And Person] : oriented to person, place, and time [Impaired Insight] : insight and judgment were intact [Affect] : the affect was normal [FreeTextEntry1] : reducible hernia in left suprapubic region by surgical scar, nontender

## 2021-11-30 NOTE — ASSESSMENT
[FreeTextEntry1] : 86-year-old woman with Parkinson's disease and a porcine mitral valve replacement with complaints of dysphagia to both solids and liquids along with heartburn.  She has not had a colonoscopy in 20 years.\par \par I had a long discussion with the patient regarding the best way to evaluate her.  Given her age and comorbidities, we both agree to approach this noninvasively and without the use of anesthesia.\par \par Therefore, the patient was sent for an esophagram and a virtual colonoscopy.\par \par The patient understands that these procedures are not as encompassing as EGD and colonoscopy but they are not invasive.\par \par If there are findings on the above studies, we will then address this.\par \par I offered the patient treatment for heartburn.  She states that she does well on Tums and wishes to continue with this.  If there is significant findings on the esophagram, we will reconsider this.

## 2021-11-30 NOTE — REASON FOR VISIT
[Initial Evaluation] : an initial evaluation [FreeTextEntry1] : Dysphagia, heartburn, colon cancer screening

## 2021-12-22 ENCOUNTER — APPOINTMENT (OUTPATIENT)
Dept: CT IMAGING | Facility: CLINIC | Age: 86
End: 2021-12-22

## 2022-01-06 ENCOUNTER — APPOINTMENT (OUTPATIENT)
Dept: DERMATOLOGY | Facility: CLINIC | Age: 87
End: 2022-01-06
Payer: MEDICARE

## 2022-01-06 PROCEDURE — 99213 OFFICE O/P EST LOW 20 MIN: CPT

## 2022-01-11 NOTE — PACU DISCHARGE NOTE - NS MD DISCHARGE NOTE DISCHARGE
Floor Winlevi Counseling:  I discussed with the patient the risks of topical clascoterone including but not limited to erythema, scaling, itching, and stinging. Patient voiced their understanding.

## 2022-04-06 NOTE — PATIENT PROFILE ADULT - SURGICAL SITE INCISION
[facility]   Physical Therapy Treatment Note     Name: Celso Murdock [facility]  Clinic Number: [id number]    Therapy Diagnosis:   Encounter Diagnoses   Name Primary?   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 He had two instances of increased right sided thoracic/rib cage area   He was compliant with home exercise program.  Response to previous treatment:  sore  Functional change: Ongoing    Pain: 0/10  Location: right sided thoracic spine     OBJECTIVE   (3/29/2022):  - No trigger points noted in periscapular area     Treatment     Celso received the treatments listed below:      therapeutic exercises to develop strength, endurance, ROM, flexibility and posture for 45 minutes including:  Seated thoracic extension - 1/2 bolster   15 x   Wall push ups      1 x 10; 1 x 7 - muscle fatigue  Shoulder external rotation - green theraband  3 x 10 - bilateral    Shoulder 90-90 internal rotation - green theraband  3 x 10  Cybex single arm chest press to serratus press  3 x 10  Seated cybex lat pull downs - 20#    2 x 10  Seated cybex rows - 20#     2 x 10      Patient Education and Home Exercises     Home Exercises Provided and Patient Education Provided      Education provided:   - home exercise program  - POC/Prognosis     Written Home Exercises Provided: not today, planning to monitor response . Exercises were reviewed and Celso was able to demonstrate them prior to the end of the session.  Celso demonstrated good  understanding of the education provided. See EMR under Patient Instructions for exercises provided during therapy sessions    ASSESSMENT   Celso is a 69 y.o. male referred to outpatient Physical Therapy with a medical diagnosis of chronic thoracic pain and facet arthropathy of multiple joints. Patient reported right axillary and thoracic pain with insidious onset about 7 months ago.  Patient does present with poor postural awareness, forward head posture, thoracic kyphosis, and periscapular strength deficits. Treatment session consisted of thoracic mobility, serratus anterior/pec major strengthening, and rotator cuff endurance. Progressed patient further with cybex upper extremity strengthening and planning to monitor response and soreness.    Celso Is progressing well towards his goals.   Pt prognosis is Good.     Pt will continue to benefit from skilled outpatient physical therapy to address the deficits listed in the problem list box on initial evaluation, provide pt/family education and to maximize pt's level of independence in the home and community environment.     Pt's spiritual, cultural and educational needs considered and pt agreeable to plan of care and goals.     Anticipated barriers to physical therapy: chronicity of pain    Goals:   Short Term Goals: 4 weeks  1. Patient will be independent with HEP in order to supplement pain free lumbar ROM - MET (4/6/2022)  2. Pt will improve thoracic mobility to WNL to promote functional mobility - PROGRESSING, NOT MET  3. Patient will improve neck rotation and sidebending range of motion 10 degrees to promote cervical range of motion when driving. - PROGRESSING, NOT MET      Long Term Goals: 8 weeks   1. Pt  will improve lumbar FOTO survey to </=33% limited in order to return to ADLs without limitation - PROGRESSING, NOT MET  2. Patient will improve low trap and mid trap strength strength to a 5/5 bilaterally for improved scapular support - PROGRESSING, NOT MET  3. Pt will report no pain during shoulder and thoracic AROM in order to promote functional mobility - PROGRESSING, NOT MET    PLAN   Thoracic mobility  Serratus anterior and pec major strengthening   Rotator cuff strengthening   Periscapular activation     Carlos Manuel Barksdale, PT      no

## 2022-04-28 ENCOUNTER — APPOINTMENT (OUTPATIENT)
Dept: DERMATOLOGY | Facility: CLINIC | Age: 87
End: 2022-04-28
Payer: MEDICARE

## 2022-04-28 PROCEDURE — 99213 OFFICE O/P EST LOW 20 MIN: CPT | Mod: 25

## 2022-04-28 PROCEDURE — 17110 DESTRUCTION B9 LES UP TO 14: CPT

## 2022-11-07 NOTE — REVIEW OF SYSTEMS
[As Noted in HPI] : as noted in HPI [Negative] : Heme/Lymph Complex Repair And Rotation Flap Text: The defect edges were debeveled with a #15 scalpel blade.  The primary defect was closed partially with a complex linear closure.  Given the location of the remaining defect, shape of the defect and the proximity to free margins a rotation flap was deemed most appropriate for complete closure of the defect.  Using a sterile surgical marker, an appropriate advancement flap was drawn incorporating the defect and placing the expected incisions within the relaxed skin tension lines where possible.    The area thus outlined was incised deep to adipose tissue with a #15 scalpel blade.  The skin margins were undermined to an appropriate distance in all directions utilizing iris scissors.

## 2022-12-01 ENCOUNTER — APPOINTMENT (OUTPATIENT)
Dept: DERMATOLOGY | Facility: CLINIC | Age: 87
End: 2022-12-01

## 2023-02-08 ENCOUNTER — APPOINTMENT (OUTPATIENT)
Dept: DERMATOLOGY | Facility: CLINIC | Age: 88
End: 2023-02-08
Payer: MEDICARE

## 2023-02-08 PROCEDURE — 99213 OFFICE O/P EST LOW 20 MIN: CPT

## 2023-03-01 NOTE — H&P PST ADULT - ENT GEN HX ROS MEA POS PC
Prior Authorization Approval    Authorization Effective Date: 3/1/2023  Authorization Expiration Date: 3/1/2024  Medication: Emla Cream- [place] Approved  Approved Dose/Quantity: 30 grams/ 30 days  Reference #: [id number]   Insurance Company: [employer] [place] - Phone [phone] Fax [phone]       dysphagia/" At times "

## 2023-03-27 ENCOUNTER — TRANSCRIPTION ENCOUNTER (OUTPATIENT)
Age: 88
End: 2023-03-27

## 2023-03-27 ENCOUNTER — INPATIENT (INPATIENT)
Facility: HOSPITAL | Age: 88
LOS: 6 days | Discharge: NURSING, REHAB OR LONG-TERM CARE | DRG: 480 | End: 2023-04-03
Attending: STUDENT IN AN ORGANIZED HEALTH CARE EDUCATION/TRAINING PROGRAM | Admitting: HOSPITALIST
Payer: MEDICARE

## 2023-03-27 VITALS
HEART RATE: 80 BPM | WEIGHT: 119.93 LBS | RESPIRATION RATE: 18 BRPM | DIASTOLIC BLOOD PRESSURE: 65 MMHG | HEIGHT: 64 IN | OXYGEN SATURATION: 94 % | SYSTOLIC BLOOD PRESSURE: 116 MMHG | TEMPERATURE: 98 F

## 2023-03-27 DIAGNOSIS — Z90.89 ACQUIRED ABSENCE OF OTHER ORGANS: Chronic | ICD-10-CM

## 2023-03-27 DIAGNOSIS — Z90.710 ACQUIRED ABSENCE OF BOTH CERVIX AND UTERUS: Chronic | ICD-10-CM

## 2023-03-27 DIAGNOSIS — S72.92XA UNSPECIFIED FRACTURE OF LEFT FEMUR, INITIAL ENCOUNTER FOR CLOSED FRACTURE: ICD-10-CM

## 2023-03-27 DIAGNOSIS — Z98.890 OTHER SPECIFIED POSTPROCEDURAL STATES: Chronic | ICD-10-CM

## 2023-03-27 DIAGNOSIS — Z87.81 PERSONAL HISTORY OF (HEALED) TRAUMATIC FRACTURE: Chronic | ICD-10-CM

## 2023-03-27 DIAGNOSIS — N89.8 OTHER SPECIFIED NONINFLAMMATORY DISORDERS OF VAGINA: Chronic | ICD-10-CM

## 2023-03-27 DIAGNOSIS — Z95.2 PRESENCE OF PROSTHETIC HEART VALVE: Chronic | ICD-10-CM

## 2023-03-27 DIAGNOSIS — Z90.49 ACQUIRED ABSENCE OF OTHER SPECIFIED PARTS OF DIGESTIVE TRACT: Chronic | ICD-10-CM

## 2023-03-27 PROCEDURE — 71275 CT ANGIOGRAPHY CHEST: CPT | Mod: 26

## 2023-03-27 PROCEDURE — 73502 X-RAY EXAM HIP UNI 2-3 VIEWS: CPT | Mod: 26,LT

## 2023-03-27 PROCEDURE — 73552 X-RAY EXAM OF FEMUR 2/>: CPT | Mod: 26,LT

## 2023-03-27 PROCEDURE — 99223 1ST HOSP IP/OBS HIGH 75: CPT

## 2023-03-27 PROCEDURE — 99285 EMERGENCY DEPT VISIT HI MDM: CPT

## 2023-03-27 PROCEDURE — 99223 1ST HOSP IP/OBS HIGH 75: CPT | Mod: 57

## 2023-03-27 PROCEDURE — 71045 X-RAY EXAM CHEST 1 VIEW: CPT | Mod: 26

## 2023-03-27 PROCEDURE — 93010 ELECTROCARDIOGRAM REPORT: CPT

## 2023-03-27 RX ORDER — MORPHINE SULFATE 50 MG/1
2 CAPSULE, EXTENDED RELEASE ORAL ONCE
Refills: 0 | Status: DISCONTINUED | OUTPATIENT
Start: 2023-03-27 | End: 2023-03-28

## 2023-03-27 RX ORDER — MUPIROCIN 20 MG/G
1 OINTMENT TOPICAL
Refills: 0 | Status: DISCONTINUED | OUTPATIENT
Start: 2023-03-27 | End: 2023-03-28

## 2023-03-27 RX ORDER — CHLORHEXIDINE GLUCONATE 213 G/1000ML
1 SOLUTION TOPICAL EVERY 12 HOURS
Refills: 0 | Status: DISCONTINUED | OUTPATIENT
Start: 2023-03-27 | End: 2023-04-03

## 2023-03-27 RX ORDER — TRANEXAMIC ACID 100 MG/ML
1000 INJECTION, SOLUTION INTRAVENOUS ONCE
Refills: 0 | Status: COMPLETED | OUTPATIENT
Start: 2023-03-27 | End: 2023-03-27

## 2023-03-27 RX ORDER — CARBIDOPA AND LEVODOPA 25; 100 MG/1; MG/1
1.5 TABLET ORAL ONCE
Refills: 0 | Status: COMPLETED | OUTPATIENT
Start: 2023-03-27 | End: 2023-03-27

## 2023-03-27 RX ORDER — ESCITALOPRAM OXALATE 10 MG/1
10 TABLET, FILM COATED ORAL DAILY
Refills: 0 | Status: DISCONTINUED | OUTPATIENT
Start: 2023-03-27 | End: 2023-03-28

## 2023-03-27 RX ADMIN — TRANEXAMIC ACID 220 MILLIGRAM(S): 100 INJECTION, SOLUTION INTRAVENOUS at 21:53

## 2023-03-27 NOTE — H&P ADULT - PROBLEM SELECTOR PLAN 1
s/p fall  npo after midnight s/p fall  npo after midnight  RCRI score 1, class II risk, 6%  30 day risk of death, MI or cardiac arrest  pt. with no cp, no sob, no acute ischemic ekg changes.  will continue her b.blk  pt. may proceed to the OR as planned by ortho team

## 2023-03-27 NOTE — CONSULT NOTE ADULT - NS ATTEND AMEND GEN_ALL_CORE FT
Orthopaedic Trauma Surgeon Addendum:    I have personally performed a face-to-face diagnostic evaluation on this patient.  I have reviewed the physician assistant note and agree with the history, exam, and plan of care, except as noted.    Greatly appreciate rapid evaluation by Anesthesia team. No risk alerts noted. Per the Fragility Fracture Protocol, patient should be deemed optimized for her urgent surgery 3/28.     Orthopedic Surgery is ready to proceed with surgery pending medical optimization and adequate Operating Room availability. Risks of surgical delay discussed with other providers and staff. Please call with any questions or concerns.     Orlando Ram MD  Orthopaedic Trauma Surgeon  [facility] [place]

## 2023-03-27 NOTE — ED ADULT NURSE NOTE - OBJECTIVE STATEMENT
Pt BIB EMS from home c/o L hip s/p fall at home - in acute pain unable to ambulate pending md annemarie gomez rn

## 2023-03-27 NOTE — H&P ADULT - HISTORY OF PRESENT ILLNESS
89 y/o female pt. presenting to the emergency department with a chief complaint of left hip pain after a fall today.  Patient reports while in her bathroom she lost her balance and fell backwards onto her left hip. He has unsteady gait due to parkinson's. denies loss of consciousness, denies any other orthopedic injuries at this time.   pt. is an 89 y/o female pt. presenting to the emergency department with a chief complaint of left hip pain after a fall today.  Patient reports while in her bathroom she lost her balance and fell backwards onto her left hip. He has unsteady gait due to parkinson's and walks with walker. denies loss of consciousness, no dizziness, reports no head trauma, alert., awake in the ER, pt. stated that she has emergency alert system and called 911. no other injuries. pt. reports no cp or sob when walks around the house with walker. no abd. pain, no n/v/d. live alone but gets help from the aide. pt. stated that she is not on any blood thinners, on aspirin 81 mg po daily, system down and not letting add aspirin to her home meds.  pt. is an 89 y/o female pt. presenting to the emergency department with a chief complaint of left hip pain after a fall today.  Patient reports while in her bathroom she lost her balance and fell backwards onto her left hip. He has unsteady gait due to parkinson's and walks with walker. denies loss of consciousness, no dizziness, reports no head trauma, alert., awake in the ER, pt. stated that she has emergency alert system and called 911. no other injuries. pt. reports no cp or sob when walks around the house with walker. no abd. pain, no n/v/d. live alone but gets help from the aide. pt. stated that she is not on any blood thinners, on aspirin 81 mg po daily, sinemet is 25/100 , 1.5 tabs po 5 times a day now, used to be on 1 tab 4 times a day,  system down and not letting add aspirin to her home meds. pt. also stated that she had MVR 10 years ago.

## 2023-03-27 NOTE — ED ADULT TRIAGE NOTE - CHIEF COMPLAINT QUOTE
c/o trip and fall in the bathroom says she fell backwards denies loc and hitting her head pt c/o left hip pain unable to move

## 2023-03-27 NOTE — H&P ADULT - NSICDXPASTSURGICALHX_GEN_ALL_CORE_FT
PAST SURGICAL HISTORY:  H/O mitral valve replacement 2012--[facility] -- Serial # [id number], Model # [id number], implant date: May 3, 2012  Implanted Physician : Italo Jones -[facility].    H/O resection of small bowel     H/O total hysterectomy     History of femur fracture Repaired in 6/ 2019    History of tonsillectomy and adenoidectomy     Other specified noninflammatory disorder of vagina s/p excision of vaginal lesion- benign - 2019    S/P hysterectomy     S/P small bowel resection     S/P tonsillectomy

## 2023-03-27 NOTE — ED PROVIDER NOTE - CARE PLAN
1 Principal Discharge DX:	Hip fracture, left  Secondary Diagnosis:	Parkinson disease  Secondary Diagnosis:	Paroxysmal atrial fibrillation

## 2023-03-27 NOTE — ED ADULT NURSE NOTE - NSIMPLEMENTINTERV_GEN_ALL_ED
Implemented All Fall Risk Interventions:  [place] to call system. Call bell, personal items and telephone within reach. Instruct patient to call for assistance. Room bathroom lighting operational. Non-slip footwear when patient is off stretcher. Physically safe environment: no spills, clutter or unnecessary equipment. Stretcher in lowest position, wheels locked, appropriate side rails in place. Provide visual cue, wrist band, yellow gown, etc. Monitor gait and stability. Monitor for mental status changes and reorient to person, place, and time. Review medications for side effects contributing to fall risk. Reinforce activity limits and safety measures with patient and family.

## 2023-03-27 NOTE — H&P ADULT - ASSESSMENT
pt. is an 89 y/o female pt. presenting to the emergency department with a chief complaint of left hip pain after a fall today. pt. is found to have lt. hip fracture and ortho team plans to take her to the OR tomorrow

## 2023-03-27 NOTE — CONSULT NOTE ADULT - SUBJECTIVE AND OBJECTIVE BOX
Pt Name: GARIMA ADAMS    MRN: [id number]      Patient is a 88y Female presenting to the emergency department with a chief complaint of left hip pain after a fall today.  Patient reports while in her bathroom she lost her balance and fell backwards onto her left hip. He has unsteady gait due to parkinson's. denies loss of consciousness, denies any other orthopedic injuries at this time.    REVIEW OF SYSTEMS    General: Alert, responsive, in NAD    Skin/Breast: No rashes, no pruritis, no bruising to left hip  	  Ophthalmologic: No visual changes. No redness.   	  ENMT:	No discharge. No swelling.    Respiratory and Thorax: nasal cannula  	   Cardiovascular:	No chest pain. No palpitations.    Gastrointestinal:	 No abdominal pain. No diarrhea.     Genitourinary: No dysuria. No bleeding.    Musculoskeletal: SEE HPI.    Neurological: No sensory or motor changes.     Psychiatric: No anxiety or depression.    Hematology/Lymphatics: No swelling.    ROS is otherwise negative.    PAST MEDICAL & SURGICAL HISTORY:  PAST MEDICAL & SURGICAL HISTORY:  Parkinson disease  followed by neurolohgist ,last week 2 weeks ago carotid doppler done - "normal"      PAF (paroxysmal atrial fibrillation)      Stress incontinence      Chronic GERD      Other specified noninflammatory disorders of vagina      H/O mitral valve replacement  2012      Anxiety and depression      Parkinson disease      PAF (paroxysmal atrial fibrillation)  no AC on aspirin      H/O irritable bowel syndrome      Squamous cell carcinoma of skin      S/P hysterectomy      S/P tonsillectomy      S/P small bowel resection      History of femur fracture  Repaired in 6/ 2019      H/O mitral valve replacement  2012--[facility] -- Serial # [id number], Model # [id number], implant date: May 3, 2012    Implanted Physician : Italo Jones -[facility].      H/O resection of small bowel      H/O total hysterectomy      History of tonsillectomy and adenoidectomy      Other specified noninflammatory disorder of vagina  s/p excision of vaginal lesion- benign - 2019          Allergies: amiodarone (Hives)  Cipro (Muscle Pain)  diltiazem (Hives)  strawberry (Rash)      Medications:     FAMILY HISTORY:  FH: heart failure    FH: heart disease  Mother    : non-contributory    Social History:                           13.7   16.21 )-----------( 214      ( 27 Mar 2023 10:30 )             42.2       03-27    143  |  105  |  19.2  ----------------------------<  117<H>  3.9   |  27.0  |  0.74    Ca    9.0      27 Mar 2023 10:30    TPro  6.4<L>  /  Alb  4.1  /  TBili  0.8  /  DBili  x   /  AST  19  /  ALT  7   /  AlkPhos  89  03-27      Vital Signs Last 24 Hrs  T(C): 36.4 (27 Mar 2023 08:59), Max: 36.4 (27 Mar 2023 08:59)  T(F): 97.5 (27 Mar 2023 08:59), Max: 97.5 (27 Mar 2023 08:59)  HR: 80 (27 Mar 2023 08:59) (80 - 80)  BP: 116/65 (27 Mar 2023 08:59) (116/65 - 116/65)  BP(mean): --  RR: 18 (27 Mar 2023 08:59) (18 - 18)  SpO2: 94% (27 Mar 2023 08:59) (94% - 94%)    Parameters below as of 27 Mar 2023 08:59  Patient On (Oxygen Delivery Method): room air        Daily Height in cm: 162.56 (27 Mar 2023 08:59)    Daily       PHYSICAL EXAM:      Appearance: Alert, responsive, in no acute distress.    Neurological: Sensation is grossly intact to light touch. 5/5 motor function of all extremities. No focal deficits or weaknesses found.    Skin: no rash on visible skin. Skin is clean, dry and intact. No bleeding. No abrasions. No ulcerations. No ecchymosis    Vascular: 2+ distal pulses. Cap refill < 2 sec. No signs of venous insufficiency or stasis. No extremity ulcerations. No cyanosis.    Musculoskeletal:           Left Lower Extremity:   EHL/FHL intact.    Compartments soft non tender  Motor and sensory remain intact.   Left lower extremity externally rotated.      Imaging Studies:  No official read on x ray.  Basicervical left hip fracture  A/P:  Pt is a  88y Female with Left hip fracture     PLAN:     * NPO for OR tomorrow  * * Medical admission/ optimization for OR tuesday with Dr Leanna Jarvis until surgery  Pain control

## 2023-03-27 NOTE — H&P ADULT - NSHPPHYSICALEXAM_GEN_ALL_CORE
Vital Signs Last 24 Hrs  T(C): 36.4 (27 Mar 2023 18:17), Max: 36.4 (27 Mar 2023 08:59)  T(F): 97.5 (27 Mar 2023 08:59), Max: 97.5 (27 Mar 2023 08:59)  HR: 80 (27 Mar 2023 18:17) (80 - 80)  BP: 116/65 (27 Mar 2023 18:17) (116/65 - 116/65)  BP(mean): --  RR: 18 (27 Mar 2023 18:17) (18 - 18)  SpO2: 94% (27 Mar 2023 18:17) (94% - 94%)    Parameters below as of 27 Mar 2023 08:59  Patient On (Oxygen Delivery Method): room air    General: pt. in bed not in distress  eyes :  PERRL. intact EOM.   HEENT: AT, NC, no throat erythema or exudate.   Neck: supple. no JVD.   Chest: CTA bilaterally, no wheeze, no rales.   Heart: S1,S2. RRR. no heart murmur. no edema.   Abdomen: soft. non-tender. non-distended. + BS.   Ext: no calf tenderness on either side. LLE slightly externally rotated. reports pain over lt. hip area.   Neuro: Alert, awake, following commands, no focal weakness. no speech disorder  Skin: warm and dry  psych : mood ok, no si/hi

## 2023-03-27 NOTE — H&P ADULT - NSICDXPASTMEDICALHX_GEN_ALL_CORE_FT
PAST MEDICAL HISTORY:  Anxiety and depression     Chronic GERD     H/O irritable bowel syndrome     H/O mitral valve replacement 2012    Other specified noninflammatory disorders of vagina     PAF (paroxysmal atrial fibrillation)     PAF (paroxysmal atrial fibrillation) no AC on aspirin    Parkinson disease followed by neurolohgist ,last week 2 weeks ago carotid doppler done - "normal"    Parkinson disease     Squamous cell carcinoma of skin     Stress incontinence

## 2023-03-27 NOTE — ED PROVIDER NOTE - OBJECTIVE STATEMENT
87 yo female s/p fall in the bathroom with pain in left hip ; pt denies any head , neck , chest or back pain

## 2023-03-28 ENCOUNTER — TRANSCRIPTION ENCOUNTER (OUTPATIENT)
Age: 88
End: 2023-03-28

## 2023-03-28 DIAGNOSIS — S72.002A FRACTURE OF UNSPECIFIED PART OF NECK OF LEFT FEMUR, INITIAL ENCOUNTER FOR CLOSED FRACTURE: ICD-10-CM

## 2023-03-28 DIAGNOSIS — Z86.59 PERSONAL HISTORY OF OTHER MENTAL AND BEHAVIORAL DISORDERS: ICD-10-CM

## 2023-03-28 DIAGNOSIS — F41.9 ANXIETY DISORDER, UNSPECIFIED: ICD-10-CM

## 2023-03-28 DIAGNOSIS — G20 PARKINSON'S DISEASE: ICD-10-CM

## 2023-03-28 LAB
ANION GAP SERPL CALC-SCNC: 8 MMOL/L — SIGNIFICANT CHANGE UP (ref 5–17)
BASOPHILS # BLD AUTO: 0.03 K/UL — SIGNIFICANT CHANGE UP (ref 0–0.2)
BASOPHILS NFR BLD AUTO: 0.3 % — SIGNIFICANT CHANGE UP (ref 0–2)
BUN SERPL-MCNC: 14.8 MG/DL — SIGNIFICANT CHANGE UP (ref 8–20)
CALCIUM SERPL-MCNC: 8.2 MG/DL — LOW (ref 8.4–10.5)
CHLORIDE SERPL-SCNC: 106 MMOL/L — SIGNIFICANT CHANGE UP (ref 96–108)
CO2 SERPL-SCNC: 26 MMOL/L — SIGNIFICANT CHANGE UP (ref 22–29)
CREAT SERPL-MCNC: 0.56 MG/DL — SIGNIFICANT CHANGE UP (ref 0.5–1.3)
EGFR: 89 ML/MIN/1.73M2 — SIGNIFICANT CHANGE UP
EOSINOPHIL # BLD AUTO: 0.03 K/UL — SIGNIFICANT CHANGE UP (ref 0–0.5)
EOSINOPHIL NFR BLD AUTO: 0.3 % — SIGNIFICANT CHANGE UP (ref 0–6)
GLUCOSE SERPL-MCNC: 134 MG/DL — HIGH (ref 70–99)
HCT VFR BLD CALC: 32 % — LOW (ref 34.5–45)
HCT VFR BLD CALC: 34.7 % — SIGNIFICANT CHANGE UP (ref 34.5–45)
HGB BLD-MCNC: 10.5 G/DL — LOW (ref 11.5–15.5)
HGB BLD-MCNC: 11.5 G/DL — SIGNIFICANT CHANGE UP (ref 11.5–15.5)
IMM GRANULOCYTES NFR BLD AUTO: 0.5 % — SIGNIFICANT CHANGE UP (ref 0–0.9)
LYMPHOCYTES # BLD AUTO: 1.41 K/UL — SIGNIFICANT CHANGE UP (ref 1–3.3)
LYMPHOCYTES # BLD AUTO: 14.5 % — SIGNIFICANT CHANGE UP (ref 13–44)
MCHC RBC-ENTMCNC: 31.1 PG — SIGNIFICANT CHANGE UP (ref 27–34)
MCHC RBC-ENTMCNC: 31.3 PG — SIGNIFICANT CHANGE UP (ref 27–34)
MCHC RBC-ENTMCNC: 32.8 GM/DL — SIGNIFICANT CHANGE UP (ref 32–36)
MCHC RBC-ENTMCNC: 33.1 GM/DL — SIGNIFICANT CHANGE UP (ref 32–36)
MCV RBC AUTO: 94.3 FL — SIGNIFICANT CHANGE UP (ref 80–100)
MCV RBC AUTO: 94.7 FL — SIGNIFICANT CHANGE UP (ref 80–100)
MONOCYTES # BLD AUTO: 1.41 K/UL — HIGH (ref 0–0.9)
MONOCYTES NFR BLD AUTO: 14.5 % — HIGH (ref 2–14)
MRSA PCR RESULT.: SIGNIFICANT CHANGE UP
NEUTROPHILS # BLD AUTO: 6.77 K/UL — SIGNIFICANT CHANGE UP (ref 1.8–7.4)
NEUTROPHILS NFR BLD AUTO: 69.9 % — SIGNIFICANT CHANGE UP (ref 43–77)
PLATELET # BLD AUTO: 133 K/UL — LOW (ref 150–400)
PLATELET # BLD AUTO: 142 K/UL — LOW (ref 150–400)
POTASSIUM SERPL-MCNC: 4.6 MMOL/L — SIGNIFICANT CHANGE UP (ref 3.5–5.3)
POTASSIUM SERPL-SCNC: 4.6 MMOL/L — SIGNIFICANT CHANGE UP (ref 3.5–5.3)
RBC # BLD: 3.38 M/UL — LOW (ref 3.8–5.2)
RBC # BLD: 3.68 M/UL — LOW (ref 3.8–5.2)
RBC # FLD: 13.2 % — SIGNIFICANT CHANGE UP (ref 10.3–14.5)
RBC # FLD: 13.3 % — SIGNIFICANT CHANGE UP (ref 10.3–14.5)
S AUREUS DNA NOSE QL NAA+PROBE: SIGNIFICANT CHANGE UP
SARS-COV-2 RNA SPEC QL NAA+PROBE: SIGNIFICANT CHANGE UP
SODIUM SERPL-SCNC: 140 MMOL/L — SIGNIFICANT CHANGE UP (ref 135–145)
WBC # BLD: 11.99 K/UL — HIGH (ref 3.8–10.5)
WBC # BLD: 9.7 K/UL — SIGNIFICANT CHANGE UP (ref 3.8–10.5)
WBC # FLD AUTO: 11.99 K/UL — HIGH (ref 3.8–10.5)
WBC # FLD AUTO: 9.7 K/UL — SIGNIFICANT CHANGE UP (ref 3.8–10.5)

## 2023-03-28 PROCEDURE — 27095 INJECTION FOR HIP X-RAY: CPT | Mod: LT

## 2023-03-28 PROCEDURE — 71045 X-RAY EXAM CHEST 1 VIEW: CPT | Mod: 26

## 2023-03-28 PROCEDURE — 73502 X-RAY EXAM HIP UNI 2-3 VIEWS: CPT | Mod: 26,LT

## 2023-03-28 PROCEDURE — 27245 TREAT THIGH FRACTURE: CPT | Mod: LT

## 2023-03-28 PROCEDURE — 99233 SBSQ HOSP IP/OBS HIGH 50: CPT

## 2023-03-28 DEVICE — GRAFT BONE INJ CANN TRAUMACEM FOR TFNA: Type: IMPLANTABLE DEVICE | Status: FUNCTIONAL

## 2023-03-28 DEVICE — KIT SYRINGE TRAUMACEM V PLUS STRL: Type: IMPLANTABLE DEVICE | Status: FUNCTIONAL

## 2023-03-28 DEVICE — GRAFT BONE INJ TRAUMACEM TM V PLUS BONE CEMENT: Type: IMPLANTABLE DEVICE | Status: FUNCTIONAL

## 2023-03-28 DEVICE — SCREW LOKG 5X36MM: Type: IMPLANTABLE DEVICE | Status: FUNCTIONAL

## 2023-03-28 DEVICE — NAIL CANN TFNA 130 DEG 12X170MM STRL: Type: IMPLANTABLE DEVICE | Status: FUNCTIONAL

## 2023-03-28 DEVICE — BLADE SYNTHES TI HELICAL 85MM: Type: IMPLANTABLE DEVICE | Status: FUNCTIONAL

## 2023-03-28 RX ORDER — CARBIDOPA AND LEVODOPA 25; 100 MG/1; MG/1
1.5 TABLET ORAL
Refills: 0 | Status: DISCONTINUED | OUTPATIENT
Start: 2023-03-28 | End: 2023-04-03

## 2023-03-28 RX ORDER — ONDANSETRON 8 MG/1
4 TABLET, FILM COATED ORAL EVERY 6 HOURS
Refills: 0 | Status: DISCONTINUED | OUTPATIENT
Start: 2023-03-28 | End: 2023-04-03

## 2023-03-28 RX ORDER — OXYCODONE HYDROCHLORIDE 5 MG/1
2.5 TABLET ORAL EVERY 4 HOURS
Refills: 0 | Status: DISCONTINUED | OUTPATIENT
Start: 2023-03-28 | End: 2023-04-03

## 2023-03-28 RX ORDER — OXYCODONE HYDROCHLORIDE 5 MG/1
5 TABLET ORAL ONCE
Refills: 0 | Status: DISCONTINUED | OUTPATIENT
Start: 2023-03-28 | End: 2023-03-28

## 2023-03-28 RX ORDER — OXYCODONE HYDROCHLORIDE 5 MG/1
5 TABLET ORAL EVERY 4 HOURS
Refills: 0 | Status: DISCONTINUED | OUTPATIENT
Start: 2023-03-28 | End: 2023-04-03

## 2023-03-28 RX ORDER — MAGNESIUM HYDROXIDE 400 MG/1
30 TABLET, CHEWABLE ORAL DAILY
Refills: 0 | Status: DISCONTINUED | OUTPATIENT
Start: 2023-03-28 | End: 2023-04-03

## 2023-03-28 RX ORDER — SODIUM CHLORIDE 9 MG/ML
1000 INJECTION, SOLUTION INTRAVENOUS
Refills: 0 | Status: DISCONTINUED | OUTPATIENT
Start: 2023-03-28 | End: 2023-03-28

## 2023-03-28 RX ORDER — METOPROLOL TARTRATE 50 MG
25 TABLET ORAL DAILY
Refills: 0 | Status: DISCONTINUED | OUTPATIENT
Start: 2023-03-28 | End: 2023-03-28

## 2023-03-28 RX ORDER — ONDANSETRON 8 MG/1
4 TABLET, FILM COATED ORAL ONCE
Refills: 0 | Status: DISCONTINUED | OUTPATIENT
Start: 2023-03-28 | End: 2023-03-28

## 2023-03-28 RX ORDER — ENOXAPARIN SODIUM 100 MG/ML
40 INJECTION SUBCUTANEOUS EVERY 24 HOURS
Refills: 0 | Status: DISCONTINUED | OUTPATIENT
Start: 2023-03-29 | End: 2023-04-03

## 2023-03-28 RX ORDER — ACETAMINOPHEN 500 MG
1000 TABLET ORAL ONCE
Refills: 0 | Status: DISCONTINUED | OUTPATIENT
Start: 2023-03-28 | End: 2023-03-28

## 2023-03-28 RX ORDER — CEFAZOLIN SODIUM 1 G
2000 VIAL (EA) INJECTION
Refills: 0 | Status: COMPLETED | OUTPATIENT
Start: 2023-03-28 | End: 2023-03-29

## 2023-03-28 RX ORDER — SODIUM CHLORIDE 9 MG/ML
1000 INJECTION INTRAMUSCULAR; INTRAVENOUS; SUBCUTANEOUS
Refills: 0 | Status: DISCONTINUED | OUTPATIENT
Start: 2023-03-28 | End: 2023-03-30

## 2023-03-28 RX ORDER — CARBIDOPA AND LEVODOPA 25; 100 MG/1; MG/1
1.5 TABLET ORAL
Refills: 0 | Status: DISCONTINUED | OUTPATIENT
Start: 2023-03-28 | End: 2023-03-28

## 2023-03-28 RX ORDER — ESCITALOPRAM OXALATE 10 MG/1
10 TABLET, FILM COATED ORAL DAILY
Refills: 0 | Status: DISCONTINUED | OUTPATIENT
Start: 2023-03-28 | End: 2023-04-03

## 2023-03-28 RX ORDER — METOPROLOL TARTRATE 50 MG
25 TABLET ORAL DAILY
Refills: 0 | Status: DISCONTINUED | OUTPATIENT
Start: 2023-03-28 | End: 2023-04-01

## 2023-03-28 RX ORDER — CEFAZOLIN SODIUM 1 G
2000 VIAL (EA) INJECTION ONCE
Refills: 0 | Status: DISCONTINUED | OUTPATIENT
Start: 2023-03-28 | End: 2023-03-28

## 2023-03-28 RX ORDER — ACETAMINOPHEN 500 MG
650 TABLET ORAL EVERY 6 HOURS
Refills: 0 | Status: DISCONTINUED | OUTPATIENT
Start: 2023-03-28 | End: 2023-04-03

## 2023-03-28 RX ORDER — FENTANYL CITRATE 50 UG/ML
25 INJECTION INTRAVENOUS
Refills: 0 | Status: DISCONTINUED | OUTPATIENT
Start: 2023-03-28 | End: 2023-03-28

## 2023-03-28 RX ORDER — ACETAMINOPHEN 500 MG
650 TABLET ORAL EVERY 6 HOURS
Refills: 0 | Status: DISCONTINUED | OUTPATIENT
Start: 2023-03-28 | End: 2023-03-28

## 2023-03-28 RX ADMIN — SODIUM CHLORIDE 100 MILLILITER(S): 9 INJECTION INTRAMUSCULAR; INTRAVENOUS; SUBCUTANEOUS at 19:00

## 2023-03-28 RX ADMIN — MORPHINE SULFATE 2 MILLIGRAM(S): 50 CAPSULE, EXTENDED RELEASE ORAL at 00:38

## 2023-03-28 RX ADMIN — MUPIROCIN 1 APPLICATION(S): 20 OINTMENT TOPICAL at 05:17

## 2023-03-28 RX ADMIN — CARBIDOPA AND LEVODOPA 1.5 TABLET(S): 25; 100 TABLET ORAL at 22:28

## 2023-03-28 RX ADMIN — CHLORHEXIDINE GLUCONATE 1 APPLICATION(S): 213 SOLUTION TOPICAL at 05:16

## 2023-03-28 RX ADMIN — CARBIDOPA AND LEVODOPA 1.5 TABLET(S): 25; 100 TABLET ORAL at 05:16

## 2023-03-28 RX ADMIN — Medication 2000 MILLIGRAM(S): at 20:24

## 2023-03-28 RX ADMIN — CARBIDOPA AND LEVODOPA 1.5 TABLET(S): 25; 100 TABLET ORAL at 10:14

## 2023-03-28 RX ADMIN — Medication 650 MILLIGRAM(S): at 20:20

## 2023-03-28 RX ADMIN — Medication 650 MILLIGRAM(S): at 19:23

## 2023-03-28 RX ADMIN — ESCITALOPRAM OXALATE 10 MILLIGRAM(S): 10 TABLET, FILM COATED ORAL at 10:15

## 2023-03-28 RX ADMIN — Medication 650 MILLIGRAM(S): at 10:15

## 2023-03-28 RX ADMIN — OXYCODONE HYDROCHLORIDE 5 MILLIGRAM(S): 5 TABLET ORAL at 04:28

## 2023-03-28 RX ADMIN — OXYCODONE HYDROCHLORIDE 5 MILLIGRAM(S): 5 TABLET ORAL at 23:28

## 2023-03-28 RX ADMIN — Medication 25 MILLIGRAM(S): at 05:16

## 2023-03-28 NOTE — PATIENT PROFILE ADULT - FALL HARM RISK - HARM RISK INTERVENTIONS

## 2023-03-28 NOTE — DISCHARGE NOTE PROVIDER - HOSPITAL COURSE
87 y/o female pt. presenting to the emergency department with a chief complaint of left hip pain after a fall today.  Patient reports while in her bathroom she lost her balance and fell backwards onto her left hip. He has unsteady gait due to parkinson's and walks with walker. denies loss of consciousness, no dizziness, reports no head trauma, alert., awake in the ER, pt. stated that she has emergency alert system and called 911. no other injuries. pt. reports no cp or sob when walks around the house with walker. no abd. pain, no n/v/d. live alone but gets help from the aide. pt. stated that she is not on any blood thinners, on aspirin 81 mg po daily, sinemet is 25/100 , 1.5 tabs po 5 times a day now, used to be on 1 tab 4 times a day, Pt also stated that she had MVR 10 years ago. Pt is s/p OR for IM nail ,  Intraoperatively, noted to have episodes of hypotension, required transient pressor and fluid boluses with improvement in BPs. EBL of 200 cc.HB dropped. Afib w/ RVR likely due to hypovolemia, improved w/ fluids and titration of beta blockers, deemed fit for DC  to [facility].

## 2023-03-28 NOTE — DISCHARGE NOTE PROVIDER - NSDCCPCAREPLAN_GEN_ALL_CORE_FT
PRINCIPAL DISCHARGE DIAGNOSIS  Diagnosis: Hip fracture, left  Assessment and Plan of Treatment: You were treated for a hip Fx, please take part in physical therapy at [facility] daily. Please follow up with orthopedics outpatient      SECONDARY DISCHARGE DIAGNOSES  Diagnosis: Parkinson disease  Assessment and Plan of Treatment: continue with your home dose of sinamet    Diagnosis: Paroxysmal atrial fibrillation  Assessment and Plan of Treatment: Please take metoprolol twice a day. MUST drink lots of water, follow up with cardiology within 2 weeks of discharge

## 2023-03-28 NOTE — DISCHARGE NOTE PROVIDER - CARE PROVIDER_API CALL
Orlando Ram)  Orthopaedic Surgery  [address] [place] [address]  Phone: [phone]  Fax: [phone]  Follow Up Time:    Orlando Ram)  Orthopaedic Surgery  [address] [place] [address]  Phone: [phone]  Fax: [phone]  Follow Up Time:     Ishan Mcdowell)  Cardiology  [address] [place] [address]  Phone: [phone]  Fax: [phone]  Follow Up Time: 2 weeks

## 2023-03-28 NOTE — DISCHARGE NOTE PROVIDER - CARE PROVIDERS DIRECT ADDRESSES
[email or web address]

## 2023-03-28 NOTE — DISCHARGE NOTE PROVIDER - DID THE PATIENT PRESENT WITH OR WAS TREATED FOR MALNUTRITION DURING THIS ADMISSION
Chief Complaint:  Establish Care; Medication Management; and Blood Pressure    HPI:  Jone Levin is a 74 year old male former patient of Dr. Ulloa who is here to establish care.    At his last visit with Dr. Ulloa, he was having dizziness and his blood pressure medication was spread out over the course of the day.  The patient cut his valsartan in half and is taking 60 mg in the morning and 60 mg at night.  He is no longer having dizziness.    He follows with Dr. Caballero for coronary artery disease s/p stent x1, paroxysmal SVT, and carotid stenosis.  No chest pain or palpitations.  His last carotid ultrasound was Jan 2017 with 50-69% stenosis of the right internal carotid artery and no significant stenosis on the left.  No visual problems, numbness, or weakness.  No history of TIA or stroke.    He is a former smoker and has COPD.  He takes symbicort and albuterol.  No shortness of breath or cough.    He has GERD and symptoms are controlled on Protonix.    He is on Cardura for BPH and HTN.  He is not having any urinary symptoms.    He has venous insufficiency.  He is taking lasix 20 mg daily which has controlled the swelling in his legs.    He has chronic low back pain.  He has seen Dr. Meredith and Dr. Ann.  He is currently getting steroid injections and he is following up with Dr. Ann next month.    He has anxiety and intermittently takes Ativan which is effective.      Past Medical History:   Diagnosis Date   • Allergy    • Anxiety    • Arthritis     back   • Bronchitis    • CAD (coronary artery disease)    • Chronic pain     back   • COPD (chronic obstructive pulmonary disease) (CMS/[facility])    • Diaphragmatic hernia without mention of obstruction or gangrene 6/22/11    4cm   • Diverticulosis of colon (without mention of hemorrhage) 6/22/11    sigmoid colon   • Environmental allergies    • Erectile dysfunction    • Fracture     jaw, toes   • GERD (gastroesophageal reflux disease) 6/22/11    • Heart murmur    • HTN (hypertension)    • Obesity    • Sinusitis, chronic    • Stricture and stenosis of esophagus 6/22/11    wide open Schatzki's ring at 40cm   • Unspecified hemorrhoids without mention of complication 6/22/11    moderate size internal       Patient Active Problem List   Diagnosis   • HTN (hypertension)   • Chronic airway obstruction, not elsewhere classified   • Bilateral leg edema   • Obesity, unspecified   • Trigger finger   • Migraine   • Anxiety   • GERD (gastroesophageal reflux disease)   • Contracture of palmar fascia   • SVT (supraventricular tachycardia) (CMS/HCC)   • S/P coronary artery stent placement   • CAD (coronary artery disease)   • Erectile dysfunction   • Environmental allergies   • Carotid artery stenosis   • Pulmonary hypertension   • Ankle edema   • Venous insufficiency   • Chronic midline low back pain without sciatica       Past Surgical History:   Procedure Laterality Date   • CARDIAC SURGERY      stent   • CDL PTCA W/ STENT  10/09/2012    3.0x18 Xience stent in distal circ. Normal LVSF, EF 60%.   • COLONOSCOPY DIAGNOSTIC  6/22/11recall due 6/2016    Dr Cao   • ECHO HEART RESTING  09/10/2010    EF 40%   • ECHO HEART RESTING  1008/2012    EF 55%. Grade I/IV DD. Mod incr LA volume.   • ESOPHAGOGASTRODUODENOSCOPY TRANSORAL FLEX DIAG  6/22/11    Dr Cao   • FRACTURE SURGERY      jaw wiring   • HERNIA REPAIR      umbilical   • MANDIBLE FRACTURE SURGERY     • MYOCARDL PERF REST STRESS  09/17/2010    Normal Adenosine. EF 41%.       ALLERGIES:   Allergen Reactions   • Diltiazem RASH   • Norvasc [Amlodipine] SHORTNESS OF BREATH   • Clindamycin GI UPSET     reflux       Current Outpatient Prescriptions   Medication Sig Dispense Refill   • atorvastatin (LIPITOR) 40 MG tablet Take 1 tablet by mouth daily. 90 tablet 3   • budesonide-formoterol (SYMBICORT) 160-4.5 MCG/ACT inhaler Inhale 2 puffs into the lungs 2 times daily. 30.6 g 1   • furosemide (LASIX) 20 MG tablet Take 1  tablet by mouth daily. 90 tablet 3   • doxazosin (CARDURA) 4 MG tablet Take 1 tablet by mouth daily. 90 tablet 3   • valsartan (DIOVAN) 160 MG tablet Take half a tablet PO BID 90 tablet 3   • LORazepam (ATIVAN) 1 MG tablet Take 1 tablet by mouth 3 times daily as needed for Anxiety. 90 tablet 0   • metoPROLOL (TOPROL-XL) 25 MG 24 hr tablet Take 1 tablet by mouth daily. 90 tablet 0   • diclofenac 5 % cream Apply 1 pump four times daily as needed for pain 240 g 11   • custom compounded pain cream Diclofenac 5%  Apply 1 pump QID PRN Pain 240 g 11   • cetirizine (ZYRTEC) 10 MG tablet Take 1 tablet by mouth nightly. 90 tablet 3   • clopidogrel (PLAVIX) 75 MG tablet Take 1 tablet by mouth daily. (Patient taking differently: Take 75 mg by mouth daily. Indications: PATIENT TAKING ONCE DAILY ) 90 tablet 3   • pantoprazole (PROTONIX) 40 MG tablet Take 1 tablet by mouth daily. 90 tablet 3   • aspirin 81 MG tablet Take 1 tablet by mouth daily. 100 tablet 3   • albuterol-ipratropium 2.5 mg/0.5 mg (DUONEB) 0.5-2.5 (3) MG/3ML nebulizer solution Take 3 mLs by nebulization every 6 hours as needed for Wheezing. 360 mL 12   • albuterol 108 (90 BASE) MCG/ACT inhaler Inhale 2 puffs into the lungs every 4 hours as needed for Shortness of Breath or Wheezing. 1 Inhaler 11   • Multiple Vitamins-Minerals (MULTIVITAMIN PO) Take 1 tablet by mouth daily.       No current facility-administered medications for this visit.        Family History   Problem Relation Age of Onset   • Cancer Mother      breast   • Diabetes Father    • Cancer Sister      breast cancer   • Substance abuse Sister    • Liver Disease Sister        Social History     Social History   • Marital status:      Spouse name: N/A   • Number of children: N/A   • Years of education: N/A     Social History Main Topics   • Smoking status: Former Smoker     Packs/day: 2.00     Years: 40.00     Types: Cigarettes     Quit date: 10/22/1997   • Smokeless tobacco: Never Used   •  Alcohol use No   • Drug use: No   • Sexual activity: Yes     Partners: Female     Other Topics Concern   • Seat Belt Yes     Social History Narrative   • None       Review of Systems   Constitutional: Negative for chills, fever and weight loss.   Eyes: Negative for blurred vision and double vision.   Respiratory: Negative for cough, shortness of breath and wheezing.    Cardiovascular: Positive for leg swelling. Negative for chest pain, palpitations, orthopnea and PND.   Gastrointestinal: Negative for abdominal pain, blood in stool, heartburn, melena, nausea and vomiting.   Genitourinary: Negative for dysuria, frequency, hematuria and urgency.   Musculoskeletal: Positive for back pain.   Neurological: Negative for dizziness, sensory change, focal weakness, loss of consciousness and headaches.   Psychiatric/Behavioral: Negative for depression, substance abuse and suicidal ideas. The patient is nervous/anxious.        Vitals:    11/29/17 1406   BP: 140/78   Pulse: 80   Temp: 98.2 °F (36.8 °C)   TempSrc: Temporal Artery   SpO2: 95%   Weight: 118.1 kg   Height: 5' 10\" (1.778 m)     Physical Exam   Constitutional: He is oriented to person, place, and time and well-developed, well-nourished, and in no distress.   HENT:   Head: Normocephalic and atraumatic.   Eyes: Conjunctivae are normal.   Neck: Neck supple.   Cardiovascular: Normal rate, regular rhythm and normal heart sounds.    No murmur heard.  Pulmonary/Chest: Effort normal and breath sounds normal. He has no wheezes. He has no rales.   Abdominal: Soft. Bowel sounds are normal. He exhibits no distension. There is no tenderness.   Musculoskeletal: He exhibits no edema.   Neurological: He is alert and oriented to person, place, and time. Gait normal.   Skin: Skin is warm and dry.   Psychiatric: Affect and judgment normal.   Vitals reviewed.      Assessment/Plan:     Essential hypertension  - doxazosin (CARDURA) 4 MG tablet; Take 1 tablet by mouth daily.  Dispense: 90  tablet; Refill: 3  - valsartan (DIOVAN) 160 MG tablet; Take half a tablet PO BID  Dispense: 90 tablet; Refill: 3    Coronary artery disease involving native coronary artery of native heart without angina pectoris  - atorvastatin (LIPITOR) 40 MG tablet; Take 1 tablet by mouth daily.  Dispense: 90 tablet; Refill: 3    Paroxysmal supraventricular tachycardia (CMS/HCC)    Asymptomatic stenosis of right carotid artery  - atorvastatin (LIPITOR) 40 MG tablet; Take 1 tablet by mouth daily.  Dispense: 90 tablet; Refill: 3    Chronic airway obstruction, not elsewhere classified  - budesonide-formoterol (SYMBICORT) 160-4.5 MCG/ACT inhaler; Inhale 2 puffs into the lungs 2 times daily.  Dispense: 30.6 g; Refill: 1    Gastroesophageal reflux disease without esophagitis    Venous insufficiency  - furosemide (LASIX) 20 MG tablet; Take 1 tablet by mouth daily.  Dispense: 90 tablet; Refill: 3    Benign prostatic hyperplasia without lower urinary tract symptoms  - doxazosin (CARDURA) 4 MG tablet; Take 1 tablet by mouth daily.  Dispense: 90 tablet; Refill: 3    Low back pain of over 3 months duration    Anxiety  - LORazepam (ATIVAN) 1 MG tablet; Take 1 tablet by mouth 3 times daily as needed for Anxiety.  Dispense: 90 tablet; Refill: 0      Jone Levin is a 74 year old male who is here to establish care.  Medical record reviewed.  Chronic medical conditions stable.  Medications refilled as noted above.  He is due for a Tetanus vaccine which he can get a a local pharmacy due to insurance.  Follow-up in 6 months for Medicare Wellness Visit.   No

## 2023-03-28 NOTE — DISCHARGE NOTE PROVIDER - NSDCFUADDINST_GEN_ALL_CORE_FT
The patient will be seen in the office between 2-3 weeks for wound check. Sutures/Staples/Tape will be removed at that time. Patient may shower after post-op day #5 4/2/2023. The dressing is to be removed on day #7 4/4/2023. The patient will contact the office if the wound becomes red, has increasing pain, develops bleeding or discharge, an injury occurs, or has other concerns. The patient will continue PT for gait training.  The patient will continue LOVENOX for 4 weeks for DVTP. The patient will take Oxycodone as needed for pain control and titrate according to prescription and patient needs. The patient is FULL weight bearing.

## 2023-03-28 NOTE — PROGRESS NOTE ADULT - SUBJECTIVE AND OBJECTIVE BOX
INTERVAL HPI/OVERNIGHT EVENTS:    CC: s/p fall and left hip fracture s/p IM nail, mitral valve replacement, anxiety, Parkinson's disease, hypotension      Chart and course reviewed.  Patient seen pre procedure in CDU and post procedure in PACU  denies pain  noted to have hypoxia since admission, hence CTA chest done pre procedure was negative for PE.  hypotensive during  OR as per ortho  required Neosynephrine push per RN in OR, intra and post operatively received total of 2L IVF for hypotension in systolic 90s.  asymptomatic    Vital Signs Last 24 Hrs  T(C): 36.9 (28 Mar 2023 18:54), Max: 37 (28 Mar 2023 11:44)  T(F): 98.4 (28 Mar 2023 18:54), Max: 98.6 (28 Mar 2023 11:44)  HR: 102 (28 Mar 2023 18:54) (69 - 102)  BP: 106/68 (28 Mar 2023 18:54) (80/51 - 120/75)  BP(mean): 81 (28 Mar 2023 18:54) (58 - 81)  RR: 18 (28 Mar 2023 18:54) (16 - 20)  SpO2: 96% (28 Mar 2023 18:54) (95% - 100%)    Parameters below as of 28 Mar 2023 18:54  Patient On (Oxygen Delivery Method): nasal cannula  O2 Flow (L/min): 2      PHYSICAL EXAM:    GENERAL: alert, not in distress  CHEST/LUNG: b/l air entry  HEART: reg  ABDOMEN: soft, bs+  EXTREMITIES:  no edema, tenderness    MEDICATIONS  (STANDING):  carbidopa/levodopa  25/100 1.5 Tablet(s) Oral <User Schedule>  ceFAZolin  Injectable. 2000 milliGRAM(s) IV Push <User Schedule>  chlorhexidine 2% Cloths 1 Application(s) Topical every 12 hours  escitalopram 10 milliGRAM(s) Oral daily  metoprolol succinate ER 25 milliGRAM(s) Oral daily  sodium chloride 0.9%. 1000 milliLiter(s) (100 mL/Hr) IV Continuous <Continuous>    MEDICATIONS  (PRN):  acetaminophen     Tablet .. 650 milliGRAM(s) Oral every 6 hours PRN Mild Pain (1 - 3)  aluminum hydroxide/magnesium hydroxide/simethicone Suspension 30 milliLiter(s) Oral four times a day PRN Indigestion  magnesium hydroxide Suspension 30 milliLiter(s) Oral daily PRN Constipation  ondansetron Injectable 4 milliGRAM(s) IV Push every 6 hours PRN Nausea and/or Vomiting  oxyCODONE    IR 5 milliGRAM(s) Oral every 4 hours PRN Severe Pain (7 - 10)  oxyCODONE    IR 2.5 milliGRAM(s) Oral every 4 hours PRN Moderate Pain (4 - 6)      Allergies    amiodarone (Hives)  Cipro (Muscle Pain)  diltiazem (Hives)  strawberry (Rash)    Intolerances          LABS:                          10.5   11.99 )-----------( 133      ( 28 Mar 2023 17:55 )             32.0     03-28    140  |  106  |  14.8  ----------------------------<  134<H>  4.6   |  26.0  |  0.56    Ca    8.2<L>      28 Mar 2023 17:55    TPro  6.4<L>  /  Alb  4.1  /  TBili  0.8  /  DBili  x   /  AST  19  /  ALT  7   /  AlkPhos  89  03-27    PT/INR - ( 27 Mar 2023 10:30 )   PT: 12.3 sec;   INR: 1.06 ratio         PTT - ( 27 Mar 2023 10:30 )  PTT:28.2 sec      RADIOLOGY & ADDITIONAL TESTS:

## 2023-03-28 NOTE — BRIEF OPERATIVE NOTE - COMMENTS
Post-op Plan: WBAT, PT/OT, ancef per protocol, contralateral SCD, LMWH (or equivalent) x 4 weeks post-op, likely f/u prn or telehealth due to age, medical comorbidities and limited functional status.

## 2023-03-28 NOTE — PROGRESS NOTE ADULT - SUBJECTIVE AND OBJECTIVE BOX
Ortho Post Op Check    Name: GARIMA ADAMS    MR #: [id number]    Procedure: left hip IMN  Surgeon: Leanna    Pt comfortable without complaints, pain controlled  Denies CP, SOB, N/V, numbness/tingling     General Exam:  Vital Signs Last 24 Hrs  T(C): 36.9 (03-28-23 @ 18:54), Max: 36.9 (03-28-23 @ 18:54)  T(F): 98.4 (03-28-23 @ 18:54), Max: 98.4 (03-28-23 @ 18:54)  HR: 102 (03-28-23 @ 18:54) (69 - 102)  BP: 106/68 (03-28-23 @ 18:54) (80/51 - 108/61)  BP(mean): 81 (03-28-23 @ 18:54) (58 - 81)  RR: 18 (03-28-23 @ 18:54) (16 - 20)  SpO2: 96% (03-28-23 @ 18:54) (95% - 100%)    General: Pt Alert and oriented, NAD, controlled pain.  Dressings C/D/I. No bleeding.  Pulses: 2+ dorsalis pedis pulse. Cap refill < 2 sec.  Sensation: Grossly intact to light touch without deficit.  Motor: + EHL/FHL/TA/GS    T(C): 36.9 (03-28-23 @ 18:54), Max: 37 (03-28-23 @ 11:44)  HR: 102 (03-28-23 @ 18:54) (69 - 102)  BP: 106/68 (03-28-23 @ 18:54) (80/51 - 120/75)  RR: 18 (03-28-23 @ 18:54) (16 - 20)  SpO2: 96% (03-28-23 @ 18:54) (95% - 100%)    A/P: 88yFemale POD#0 s/p left hip IMN  - Stable  - Pain Control  - DVT ppx: lovenox  - WBAT, PT/OT

## 2023-03-28 NOTE — PROGRESS NOTE ADULT - ASSESSMENT
88 yr old female with anxiety/depression, Parkinson's disease, mitral valve replacement, atrial fibrillation presented after a mechanical fall at home in her bathroom, without any loss of consciousness. Noted to have a left intertrochanteric fracture, evaluated by orthopedics and underwent left hip IMN. In ED, CTA chest was done, no pulmonary embolism noted. Noted to have hypoxia. Intraoperatively, noted to have episodes of hypotension, required transient pressor and fluid boluses with improvement in BPs. EBL of 200 cc.    1. Left intertrochanteric fracture s/p IMN:  Pain control  Lovenox in am per orthopedics  PT evaluation  incentive spirometry.    2. Acute hypoxic respiratory failure, hypotension:  Supplemental oxygen  CTA chest negative.  check ECHO  no clinical signs of fluid overload  will consider cardiology evaluation pending ECHO.  given transient requirement for pressors in OR, will monitor in step down unit post op.  monitor urine output.  Post op labs noted, trend Hb.    3. Parkinson's disease:  Sinemet    4. Atrial fibrillation:  Resume beta blockers  telemetry  ECHO.    5. Anxiety/depression:  Resume Escitalopram.    6. DVT ppx:  Lovenox in am per orthopedics.    Discussed with patient, PACU RN and orthopedics.

## 2023-03-28 NOTE — BRIEF OPERATIVE NOTE - OPERATION/FINDINGS
comminuted left intertrochanteric hip fracture    implants: synthes TFN [id number], 85 mm blade, 36 mm interlock, PMMA augment

## 2023-03-28 NOTE — DISCHARGE NOTE PROVIDER - NSDCMRMEDTOKEN_GEN_ALL_CORE_FT
carbidopa-levodopa 25 mg-100 mg oral tablet: 1 tab(s) orally 5 times a day  cbd oil: Topical as needed  escitalopram 10 mg oral tablet: 1 tab(s) orally once a day  metoprolol succinate 25 mg oral tablet, extended release: 1 tab(s) orally once a day (at bedtime)   aluminum hydroxide-magnesium hydroxide 200 mg-200 mg/5 mL oral suspension: 30 milliliter(s) orally 4 times a day As needed Indigestion  aspirin 81 mg oral delayed release tablet: 2 tab(s) orally once a day  bisacodyl 10 mg rectal suppository: 1 suppository(ies) rectal once a day As needed If no bowel movement  carbidopa-levodopa 25 mg-100 mg oral tablet: 1.5 tab(s) orally 5 times a day  cbd oil: Topical as needed  escitalopram 10 mg oral tablet: 1 tab(s) orally once a day  Lopressor 50 mg oral tablet: 1 tab(s) orally 2 times a day  magnesium hydroxide 8% oral suspension: 30 milliliter(s) orally once a day As needed Constipation  oxyCODONE: 2.5 buccal every 6 hours as needed for  moderate pain  oxyCODONE 5 mg oral tablet: 1 tab(s) orally every 4 hours As needed Severe Pain (7 - 10)  polyethylene glycol 3350 oral powder for reconstitution: 17 gram(s) orally once a day As needed Constipation  senna leaf extract oral tablet: 2 tab(s) orally once a day (at bedtime)  tamsulosin 0.4 mg oral capsule: 1 cap(s) orally once a day (at bedtime)

## 2023-03-28 NOTE — DISCHARGE NOTE PROVIDER - PROVIDER TOKENS
PROVIDER:[TOKEN:[[id number]]] PROVIDER:[TOKEN:[[id number]]],PROVIDER:[TOKEN:[[id number]],FOLLOWUP:[2 weeks]]

## 2023-03-28 NOTE — DISCHARGE NOTE PROVIDER - ATTENDING DISCHARGE PHYSICAL EXAMINATION:
General: awake alert , pale color   ENT: mouth normal mucosa   Neck: supple , no JVD   Lungs: CTA bilateral   Cardio: irregular rate and rhythm, S1/S2, No murmur  Abdomen: Soft, Nontender, Nondistended; Bowel sounds present  Extremities: No calf tenderness, No pitting edema  Musculoskletal : lft thigh hip dressing in place , swollen left thigh around surgery site   + skin ecchymoses

## 2023-03-29 LAB
ANION GAP SERPL CALC-SCNC: 7 MMOL/L — SIGNIFICANT CHANGE UP (ref 5–17)
ANISOCYTOSIS BLD QL: SLIGHT — SIGNIFICANT CHANGE UP
BASOPHILS # BLD AUTO: 0 K/UL — SIGNIFICANT CHANGE UP (ref 0–0.2)
BASOPHILS NFR BLD AUTO: 0 % — SIGNIFICANT CHANGE UP (ref 0–2)
BUN SERPL-MCNC: 15.3 MG/DL — SIGNIFICANT CHANGE UP (ref 8–20)
CALCIUM SERPL-MCNC: 8 MG/DL — LOW (ref 8.4–10.5)
CHLORIDE SERPL-SCNC: 106 MMOL/L — SIGNIFICANT CHANGE UP (ref 96–108)
CO2 SERPL-SCNC: 27 MMOL/L — SIGNIFICANT CHANGE UP (ref 22–29)
CREAT SERPL-MCNC: 0.64 MG/DL — SIGNIFICANT CHANGE UP (ref 0.5–1.3)
EGFR: 86 ML/MIN/1.73M2 — SIGNIFICANT CHANGE UP
EOSINOPHIL # BLD AUTO: 0 K/UL — SIGNIFICANT CHANGE UP (ref 0–0.5)
EOSINOPHIL NFR BLD AUTO: 0 % — SIGNIFICANT CHANGE UP (ref 0–6)
GLUCOSE SERPL-MCNC: 121 MG/DL — HIGH (ref 70–99)
HCT VFR BLD CALC: 28.9 % — LOW (ref 34.5–45)
HGB BLD-MCNC: 9.3 G/DL — LOW (ref 11.5–15.5)
LYMPHOCYTES # BLD AUTO: 0.63 K/UL — LOW (ref 1–3.3)
LYMPHOCYTES # BLD AUTO: 5.2 % — LOW (ref 13–44)
MACROCYTES BLD QL: SLIGHT — SIGNIFICANT CHANGE UP
MAGNESIUM SERPL-MCNC: 1.7 MG/DL — SIGNIFICANT CHANGE UP (ref 1.6–2.6)
MANUAL SMEAR VERIFICATION: SIGNIFICANT CHANGE UP
MCHC RBC-ENTMCNC: 31.2 PG — SIGNIFICANT CHANGE UP (ref 27–34)
MCHC RBC-ENTMCNC: 32.2 GM/DL — SIGNIFICANT CHANGE UP (ref 32–36)
MCV RBC AUTO: 97 FL — SIGNIFICANT CHANGE UP (ref 80–100)
MICROCYTES BLD QL: SLIGHT — SIGNIFICANT CHANGE UP
MONOCYTES # BLD AUTO: 0.94 K/UL — HIGH (ref 0–0.9)
MONOCYTES NFR BLD AUTO: 7.8 % — SIGNIFICANT CHANGE UP (ref 2–14)
NEUTROPHILS # BLD AUTO: 10.53 K/UL — HIGH (ref 1.8–7.4)
NEUTROPHILS NFR BLD AUTO: 87 % — HIGH (ref 43–77)
OVALOCYTES BLD QL SMEAR: SLIGHT — SIGNIFICANT CHANGE UP
PHOSPHATE SERPL-MCNC: 2.1 MG/DL — LOW (ref 2.4–4.7)
PLAT MORPH BLD: NORMAL — SIGNIFICANT CHANGE UP
PLATELET # BLD AUTO: 142 K/UL — LOW (ref 150–400)
POIKILOCYTOSIS BLD QL AUTO: SLIGHT — SIGNIFICANT CHANGE UP
POLYCHROMASIA BLD QL SMEAR: SLIGHT — SIGNIFICANT CHANGE UP
POTASSIUM SERPL-MCNC: 4.8 MMOL/L — SIGNIFICANT CHANGE UP (ref 3.5–5.3)
POTASSIUM SERPL-SCNC: 4.8 MMOL/L — SIGNIFICANT CHANGE UP (ref 3.5–5.3)
RBC # BLD: 2.98 M/UL — LOW (ref 3.8–5.2)
RBC # FLD: 13.2 % — SIGNIFICANT CHANGE UP (ref 10.3–14.5)
RBC BLD AUTO: ABNORMAL
SODIUM SERPL-SCNC: 140 MMOL/L — SIGNIFICANT CHANGE UP (ref 135–145)
WBC # BLD: 12.1 K/UL — HIGH (ref 3.8–10.5)
WBC # FLD AUTO: 12.1 K/UL — HIGH (ref 3.8–10.5)

## 2023-03-29 PROCEDURE — 99233 SBSQ HOSP IP/OBS HIGH 50: CPT

## 2023-03-29 RX ORDER — SODIUM CHLORIDE 9 MG/ML
500 INJECTION INTRAMUSCULAR; INTRAVENOUS; SUBCUTANEOUS ONCE
Refills: 0 | Status: COMPLETED | OUTPATIENT
Start: 2023-03-29 | End: 2023-03-29

## 2023-03-29 RX ORDER — SODIUM,POTASSIUM PHOSPHATES 278-250MG
1 POWDER IN PACKET (EA) ORAL
Refills: 0 | Status: COMPLETED | OUTPATIENT
Start: 2023-03-29 | End: 2023-03-31

## 2023-03-29 RX ADMIN — CARBIDOPA AND LEVODOPA 1.5 TABLET(S): 25; 100 TABLET ORAL at 08:53

## 2023-03-29 RX ADMIN — CARBIDOPA AND LEVODOPA 1.5 TABLET(S): 25; 100 TABLET ORAL at 16:57

## 2023-03-29 RX ADMIN — SODIUM CHLORIDE 100 MILLILITER(S): 9 INJECTION INTRAMUSCULAR; INTRAVENOUS; SUBCUTANEOUS at 21:36

## 2023-03-29 RX ADMIN — OXYCODONE HYDROCHLORIDE 5 MILLIGRAM(S): 5 TABLET ORAL at 00:27

## 2023-03-29 RX ADMIN — Medication 650 MILLIGRAM(S): at 09:47

## 2023-03-29 RX ADMIN — Medication 2000 MILLIGRAM(S): at 00:48

## 2023-03-29 RX ADMIN — Medication 650 MILLIGRAM(S): at 08:47

## 2023-03-29 RX ADMIN — SODIUM CHLORIDE 1000 MILLILITER(S): 9 INJECTION INTRAMUSCULAR; INTRAVENOUS; SUBCUTANEOUS at 12:10

## 2023-03-29 RX ADMIN — ENOXAPARIN SODIUM 40 MILLIGRAM(S): 100 INJECTION SUBCUTANEOUS at 00:48

## 2023-03-29 RX ADMIN — SODIUM CHLORIDE 100 MILLILITER(S): 9 INJECTION INTRAMUSCULAR; INTRAVENOUS; SUBCUTANEOUS at 11:30

## 2023-03-29 RX ADMIN — CARBIDOPA AND LEVODOPA 1.5 TABLET(S): 25; 100 TABLET ORAL at 13:01

## 2023-03-29 RX ADMIN — SODIUM CHLORIDE 100 MILLILITER(S): 9 INJECTION INTRAMUSCULAR; INTRAVENOUS; SUBCUTANEOUS at 05:18

## 2023-03-29 RX ADMIN — CARBIDOPA AND LEVODOPA 1.5 TABLET(S): 25; 100 TABLET ORAL at 05:18

## 2023-03-29 RX ADMIN — CARBIDOPA AND LEVODOPA 1.5 TABLET(S): 25; 100 TABLET ORAL at 21:36

## 2023-03-29 RX ADMIN — ESCITALOPRAM OXALATE 10 MILLIGRAM(S): 10 TABLET, FILM COATED ORAL at 12:03

## 2023-03-29 RX ADMIN — Medication 1 PACKET(S): at 16:58

## 2023-03-29 RX ADMIN — Medication 25 MILLIGRAM(S): at 05:18

## 2023-03-29 NOTE — PROGRESS NOTE ADULT - SUBJECTIVE AND OBJECTIVE BOX
GARIMA ADAMS Patient is a 88y old  Female who presents with a chief complaint of lt. hip fracture (29 Mar 2023 08:53)     HPI:  pt. is an 89 y/o female pt. presenting to the emergency department with a chief complaint of left hip pain after a fall today.  Patient reports while in her bathroom she lost her balance and fell backwards onto her left hip. He has unsteady gait due to parkinson's and walks with walker. denies loss of consciousness, no dizziness, reports no head trauma, alert., awake in the ER, pt. stated that she has emergency alert system and called 911. no other injuries. pt. reports no cp or sob when walks around the house with walker. no abd. pain, no n/v/d. live alone but gets help from the aide. pt. stated that she is not on any blood thinners, on aspirin 81 mg po daily, sinemet is 25/100 , 1.5 tabs po 5 times a day now, used to be on 1 tab 4 times a day,  system down and not letting add aspirin to her home meds. pt. also stated that she had MVR 10 years ago.    (27 Mar 2023 23:24)    The patient was seen and evaluated   The patient is in no acute distress.  Denied any fever chest pain, palpitations, shortness of breath, abdominal pain, fever, dysuria, cough,     I&O's Summary    28 Mar 2023 07:01  -  29 Mar 2023 07:00  --------------------------------------------------------  IN: 1300 mL / OUT: 1175 mL / NET: 125 mL    29 Mar 2023 07:01  -  29 Mar 2023 14:45  --------------------------------------------------------  IN: 1100 mL / OUT: 160 mL / NET: 940 mL      Allergies    amiodarone (Hives)  Cipro (Muscle Pain)  diltiazem (Hives)  strawberry (Rash)    Intolerances      HEALTH ISSUES - PROBLEM Dx:  Hip fracture, left    Parkinson disease    History of anxiety          PAST MEDICAL & SURGICAL HISTORY:  Parkinson disease  followed by neurolohgist ,last week 2 weeks ago carotid doppler done - "normal"      PAF (paroxysmal atrial fibrillation)      Stress incontinence      Chronic GERD      Other specified noninflammatory disorders of vagina      H/O mitral valve replacement  2012      Anxiety and depression      Parkinson disease      PAF (paroxysmal atrial fibrillation)  no AC on aspirin      H/O irritable bowel syndrome      Squamous cell carcinoma of skin      S/P hysterectomy      S/P tonsillectomy      S/P small bowel resection      History of femur fracture  Repaired in 6/ 2019      H/O mitral valve replacement  2012--[facility] -- Serial # [id number], Model # [id number], implant date: May 3, 2012    Implanted Physician : Italo Jones -[facility].      H/O resection of small bowel      H/O total hysterectomy      History of tonsillectomy and adenoidectomy      Other specified noninflammatory disorder of vagina  s/p excision of vaginal lesion- benign - 2019              Vital Signs Last 24 Hrs  T(C): 36.5 (29 Mar 2023 12:18), Max: 37.5 (29 Mar 2023 08:10)  T(F): 97.7 (29 Mar 2023 12:18), Max: 99.5 (29 Mar 2023 08:10)  HR: 76 (29 Mar 2023 12:00) (71 - 102)  BP: 95/50 (29 Mar 2023 12:00) (87/50 - 119/58)  BP(mean): 64 (29 Mar 2023 12:00) (63 - 83)  RR: 17 (29 Mar 2023 12:00) (16 - 20)  SpO2: 96% (29 Mar 2023 12:00) (95% - 100%)    Parameters below as of 29 Mar 2023 12:00  Patient On (Oxygen Delivery Method): nasal cannula  O2 Flow (L/min): 2  T(C): 36.5 (03-29-23 @ 12:18), Max: 37.5 (03-29-23 @ 08:10)  HR: 76 (03-29-23 @ 12:00) (71 - 102)  BP: 95/50 (03-29-23 @ 12:00) (87/50 - 119/58)  RR: 17 (03-29-23 @ 12:00) (16 - 20)  SpO2: 96% (03-29-23 @ 12:00) (95% - 100%)  Wt(kg): --    PHYSICAL EXAM:    GENERAL: NAD  HEAD:  Atraumatic, Normocephalic  EYES: EOMI,  conjunctiva and sclera clear  ENMT:  Moist mucous membranes,  No lesions  NECK: Supple, No JVD, Normal thyroid  NERVOUS SYSTEM:  Alert & Oriented X3,  Moves upper and lower extremities; CNS-II-XII  CHEST/LUNG: Clear to auscultation bilaterally; No rales, rhonchi, wheezing,   HEART: Regular rate and rhythm; No murmurs,   ABDOMEN: Soft, Nontender, Nondistended; Bowel sounds present  EXTREMITIES:  Peripheral Pulses, No  cyanosis, or edema  SKIN: No rashes or lesions  psychiatry- mood and affect appropriate, Insight and judgement intact     acetaminophen     Tablet .. 650 milliGRAM(s) Oral every 6 hours PRN  aluminum hydroxide/magnesium hydroxide/simethicone Suspension 30 milliLiter(s) Oral four times a day PRN  carbidopa/levodopa  25/100 1.5 Tablet(s) Oral <User Schedule>  chlorhexidine 2% Cloths 1 Application(s) Topical every 12 hours  enoxaparin Injectable 40 milliGRAM(s) SubCutaneous every 24 hours  escitalopram 10 milliGRAM(s) Oral daily  magnesium hydroxide Suspension 30 milliLiter(s) Oral daily PRN  metoprolol succinate ER 25 milliGRAM(s) Oral daily  ondansetron Injectable 4 milliGRAM(s) IV Push every 6 hours PRN  oxyCODONE    IR 5 milliGRAM(s) Oral every 4 hours PRN  oxyCODONE    IR 2.5 milliGRAM(s) Oral every 4 hours PRN  sodium chloride 0.9%. 1000 milliLiter(s) IV Continuous <Continuous>      LABS:                          9.3    12.10 )-----------( 142      ( 29 Mar 2023 05:10 )             28.9     03-29    140  |  106  |  15.3  ----------------------------<  121<H>  4.8   |  27.0  |  0.64    Ca    8.0<L>      29 Mar 2023 05:10  Phos  2.1     03-29  Mg     1.7     03-29                CAPILLARY BLOOD GLUCOSE          RADIOLOGY & ADDITIONAL TESTS:      Consultant notes reviewed    Case discussed with consultant/provider/ family /patient

## 2023-03-29 NOTE — PROGRESS NOTE ADULT - ASSESSMENT
9 y/o female pt. presenting to the emergency department with a chief complaint of left hip pain after a fall today. pt. is found to have lt. hip fracture        Hip fracture, left.   s/p fall  RCRI score 1, class II risk, 6%  30 day risk of death, MI or cardiac arrest  pt. with no cp, no sob, no acute ischemic ekg changes.  will continue her b.blk  OR as planned by ortho team.     Parkinson disease.   ·  Plan: will continue pt's sinemet 25/100 mg, 1.5 tab po  5 times / day.    History of anxiety.   ·  Plan: mood ok, no si/hi  continue pt's escitalopram.

## 2023-03-29 NOTE — PROGRESS NOTE ADULT - SUBJECTIVE AND OBJECTIVE BOX
Procedure: left hip IMN pod #1  Surgeon: Leanna    Pt comfortable without complaints, pain controlled  Denies CP, SOB, N/V, numbness/tingling     General Exam:  Vital Signs Last 24 Hrs  T(C): 37.5 (29 Mar 2023 08:10), Max: 37.5 (29 Mar 2023 08:10)  T(F): 99.5 (29 Mar 2023 08:10), Max: 99.5 (29 Mar 2023 08:10)  HR: 90 (29 Mar 2023 05:16) (69 - 102)  BP: 112/58 (29 Mar 2023 05:16) (80/51 - 119/58)  BP(mean): 74 (29 Mar 2023 05:16) (58 - 83)  RR: 16 (29 Mar 2023 05:16) (16 - 20)  SpO2: 98% (29 Mar 2023 05:16) (95% - 100%)    Parameters below as of 29 Mar 2023 05:16  Patient On (Oxygen Delivery Method): nasal cannula  O2 Flow (L/min): 2      General: Pt lying in bed  NAD, controlled pain.  Dressings Left LE C/D/I. No bleeding.  Pulses: 2+ dorsalis pedis pulse. Cap refill < 2 sec.  Sensation: Grossly intact to light touch without deficit.  Motor: + EHL/FHL/TA/GS                          9.3    12.10 )-----------( 142      ( 29 Mar 2023 05:10 )             28.9   03-29    140  |  106  |  15.3  ----------------------------<  121<H>  4.8   |  27.0  |  0.64    Ca    8.0<L>      29 Mar 2023 05:10  Phos  2.1     03-29  Mg     1.7     03-29    TPro  6.4<L>  /  Alb  4.1  /  TBili  0.8  /  DBili  x   /  AST  19  /  ALT  7   /  AlkPhos  89  03-27          A/P: 88yFemale POD#1 s/p left hip IMN  - Stable  - Pain Control  - DVT ppx: lovenox  - WBAT, PT/OT

## 2023-03-29 NOTE — CHART NOTE - NSCHARTNOTEFT_GEN_A_CORE
RN seen and states that patient requesting diet.   VSS and asymptomatic, post-procedure   regular diet order placed.   will continue to monitor and RN to notify provider with any changes in patient status.

## 2023-03-30 LAB
ANION GAP SERPL CALC-SCNC: 12 MMOL/L — SIGNIFICANT CHANGE UP (ref 5–17)
BUN SERPL-MCNC: 16.4 MG/DL — SIGNIFICANT CHANGE UP (ref 8–20)
CALCIUM SERPL-MCNC: 7.6 MG/DL — LOW (ref 8.4–10.5)
CHLORIDE SERPL-SCNC: 107 MMOL/L — SIGNIFICANT CHANGE UP (ref 96–108)
CO2 SERPL-SCNC: 22 MMOL/L — SIGNIFICANT CHANGE UP (ref 22–29)
CREAT SERPL-MCNC: 0.52 MG/DL — SIGNIFICANT CHANGE UP (ref 0.5–1.3)
EGFR: 90 ML/MIN/1.73M2 — SIGNIFICANT CHANGE UP
GLUCOSE SERPL-MCNC: 104 MG/DL — HIGH (ref 70–99)
HCT VFR BLD CALC: 25.5 % — LOW (ref 34.5–45)
HGB BLD-MCNC: 8.2 G/DL — LOW (ref 11.5–15.5)
MAGNESIUM SERPL-MCNC: 1.7 MG/DL — SIGNIFICANT CHANGE UP (ref 1.6–2.6)
MCHC RBC-ENTMCNC: 31.1 PG — SIGNIFICANT CHANGE UP (ref 27–34)
MCHC RBC-ENTMCNC: 32.2 GM/DL — SIGNIFICANT CHANGE UP (ref 32–36)
MCV RBC AUTO: 96.6 FL — SIGNIFICANT CHANGE UP (ref 80–100)
PHOSPHATE SERPL-MCNC: 1.8 MG/DL — LOW (ref 2.4–4.7)
PLATELET # BLD AUTO: 115 K/UL — LOW (ref 150–400)
POTASSIUM SERPL-MCNC: 3.8 MMOL/L — SIGNIFICANT CHANGE UP (ref 3.5–5.3)
POTASSIUM SERPL-SCNC: 3.8 MMOL/L — SIGNIFICANT CHANGE UP (ref 3.5–5.3)
RBC # BLD: 2.64 M/UL — LOW (ref 3.8–5.2)
RBC # FLD: 13.3 % — SIGNIFICANT CHANGE UP (ref 10.3–14.5)
SODIUM SERPL-SCNC: 141 MMOL/L — SIGNIFICANT CHANGE UP (ref 135–145)
WBC # BLD: 8.5 K/UL — SIGNIFICANT CHANGE UP (ref 3.8–10.5)
WBC # FLD AUTO: 8.5 K/UL — SIGNIFICANT CHANGE UP (ref 3.8–10.5)

## 2023-03-30 PROCEDURE — 99233 SBSQ HOSP IP/OBS HIGH 50: CPT

## 2023-03-30 RX ORDER — POTASSIUM PHOSPHATE, MONOBASIC POTASSIUM PHOSPHATE, DIBASIC 236; 224 MG/ML; MG/ML
15 INJECTION, SOLUTION INTRAVENOUS ONCE
Refills: 0 | Status: COMPLETED | OUTPATIENT
Start: 2023-03-30 | End: 2023-03-30

## 2023-03-30 RX ORDER — ACETAMINOPHEN 500 MG
1000 TABLET ORAL ONCE
Refills: 0 | Status: COMPLETED | OUTPATIENT
Start: 2023-03-30 | End: 2023-03-30

## 2023-03-30 RX ORDER — ACETAMINOPHEN 500 MG
1000 TABLET ORAL ONCE
Refills: 0 | Status: DISCONTINUED | OUTPATIENT
Start: 2023-03-30 | End: 2023-03-30

## 2023-03-30 RX ORDER — MAGNESIUM SULFATE 500 MG/ML
1 VIAL (ML) INJECTION ONCE
Refills: 0 | Status: COMPLETED | OUTPATIENT
Start: 2023-03-30 | End: 2023-03-30

## 2023-03-30 RX ADMIN — ENOXAPARIN SODIUM 40 MILLIGRAM(S): 100 INJECTION SUBCUTANEOUS at 21:31

## 2023-03-30 RX ADMIN — CARBIDOPA AND LEVODOPA 1.5 TABLET(S): 25; 100 TABLET ORAL at 18:00

## 2023-03-30 RX ADMIN — Medication 1 PACKET(S): at 18:01

## 2023-03-30 RX ADMIN — ESCITALOPRAM OXALATE 10 MILLIGRAM(S): 10 TABLET, FILM COATED ORAL at 10:16

## 2023-03-30 RX ADMIN — POTASSIUM PHOSPHATE, MONOBASIC POTASSIUM PHOSPHATE, DIBASIC 62.5 MILLIMOLE(S): 236; 224 INJECTION, SOLUTION INTRAVENOUS at 11:28

## 2023-03-30 RX ADMIN — Medication 100 GRAM(S): at 10:16

## 2023-03-30 RX ADMIN — CARBIDOPA AND LEVODOPA 1.5 TABLET(S): 25; 100 TABLET ORAL at 11:24

## 2023-03-30 RX ADMIN — Medication 1000 MILLIGRAM(S): at 05:30

## 2023-03-30 RX ADMIN — CARBIDOPA AND LEVODOPA 1.5 TABLET(S): 25; 100 TABLET ORAL at 05:02

## 2023-03-30 RX ADMIN — CARBIDOPA AND LEVODOPA 1.5 TABLET(S): 25; 100 TABLET ORAL at 21:30

## 2023-03-30 RX ADMIN — CARBIDOPA AND LEVODOPA 1.5 TABLET(S): 25; 100 TABLET ORAL at 14:36

## 2023-03-30 RX ADMIN — ENOXAPARIN SODIUM 40 MILLIGRAM(S): 100 INJECTION SUBCUTANEOUS at 00:46

## 2023-03-30 RX ADMIN — Medication 1 PACKET(S): at 05:03

## 2023-03-30 RX ADMIN — Medication 400 MILLIGRAM(S): at 04:59

## 2023-03-30 NOTE — PROGRESS NOTE ADULT - SUBJECTIVE AND OBJECTIVE BOX
GARIMA ADAMS Patient is a 88y old  Female who presents with a chief complaint of lt. hip fracture (29 Mar 2023 08:53)       cc: left hip fracture s/p IM nail   pain is well controlled   no dizziness, no nausea    Vital Signs Last 24 Hrs  T(C): 36.6 (30 Mar 2023 11:58), Max: 37.9 (30 Mar 2023 04:00)  T(F): 97.9 (30 Mar 2023 11:58), Max: 100.3 (30 Mar 2023 04:00)  HR: 82 (30 Mar 2023 08:01) (82 - 95)  BP: 109/79 (30 Mar 2023 08:01) (75/47 - 118/92)  BP(mean): 89 (30 Mar 2023 08:01) (58 - 102)  RR: 18 (30 Mar 2023 08:01) (17 - 19)  SpO2: 97% (30 Mar 2023 08:01) (94% - 100%)    Parameters below as of 30 Mar 2023 08:01  Patient On (Oxygen Delivery Method): nasal cannula  O2 Flow (L/min): 2        PHYSICAL EXAM:    GENERAL: NAD  HEAD:  Atraumatic, Normocephalic  EYES: EOMI,  conjunctiva and sclera clear  ENMT:  Moist mucous membranes,  No lesions  NECK: Supple, No JVD, Normal thyroid  NERVOUS SYSTEM:  Alert & Oriented X3,  Moves upper and lower extremities; CNS-II-XII  CHEST/LUNG: Clear to auscultation bilaterally; No rales, rhonchi   HEART: Regular rate and rhythm; No murmurs,   ABDOMEN: Soft, Nontender, Nondistended; Bowel sounds present  EXTREMITIES:  No  cyanosis, or edema          LABS:                              8.2    8.50  )-----------( 115      ( 30 Mar 2023 04:55 )             25.5   03-30    141  |  107  |  16.4  ----------------------------<  104<H>  3.8   |  22.0  |  0.52    Ca    7.6<L>      30 Mar 2023 04:55  Phos  1.8     03-30  Mg     1.7     03-30          MEDICATIONS  (STANDING):  carbidopa/levodopa  25/100 1.5 Tablet(s) Oral <User Schedule>  chlorhexidine 2% Cloths 1 Application(s) Topical every 12 hours  enoxaparin Injectable 40 milliGRAM(s) SubCutaneous every 24 hours  escitalopram 10 milliGRAM(s) Oral daily  metoprolol succinate ER 25 milliGRAM(s) Oral daily  potassium phosphate / sodium phosphate Powder (PHOS-NaK) 1 Packet(s) Oral two times a day    MEDICATIONS  (PRN):  acetaminophen     Tablet .. 650 milliGRAM(s) Oral every 6 hours PRN Mild Pain (1 - 3)  aluminum hydroxide/magnesium hydroxide/simethicone Suspension 30 milliLiter(s) Oral four times a day PRN Indigestion  bisacodyl Suppository 10 milliGRAM(s) Rectal daily PRN If no bowel movement  magnesium hydroxide Suspension 30 milliLiter(s) Oral daily PRN Constipation  ondansetron Injectable 4 milliGRAM(s) IV Push every 6 hours PRN Nausea and/or Vomiting  oxyCODONE    IR 5 milliGRAM(s) Oral every 4 hours PRN Severe Pain (7 - 10)  oxyCODONE    IR 2.5 milliGRAM(s) Oral every 4 hours PRN Moderate Pain (4 - 6)          RADIOLOGY & ADDITIONAL TESTS:      Consultant notes reviewed    Case discussed with consultant/patient

## 2023-03-30 NOTE — PROGRESS NOTE ADULT - SUBJECTIVE AND OBJECTIVE BOX
ORTHOPEDIC POST-OP PROGRESS NOTE:  Name: GARIMA ADAMS    MR #: [id number]    Procedure: Intramedulary nail of left hip  Surgeon: Dr. Ram  DOS: 3/28      Pt comfortable without complaints, pain controlled. Denies CP, SOB, N/V, numbness/tingling         Vital Signs Last 24 Hrs  T(C): 36.7 (03-30-23 @ 08:00), Max: 36.9 (03-30-23 @ 06:15)  T(F): 98.1 (03-30-23 @ 08:00), Max: 98.4 (03-30-23 @ 06:15)  HR: 85 (03-30-23 @ 06:15) (85 - 85)  BP: 101/55 (03-30-23 @ 06:15) (101/55 - 101/55)  BP(mean): 70 (03-30-23 @ 06:15) (70 - 70)  RR: 18 (03-30-23 @ 06:15) (18 - 18)  SpO2: 95% (03-30-23 @ 06:15) (95% - 95%)      General Exam:    General: Pt Alert and oriented, NAD, controlled pain.    Dressings C/D/I. No bleeding. No erythema.    Pulses: + dorsalis pedis pulse. Cap refill < 2 sec.    Sensation: Grossly intact to light touch without deficit.    Motor: +EHL/FHL/AT/GC        A/P: 88y Female s/p left hip IMN POD #2    - Stable  - Pain Control  - DVT ppx: lovenox  - WBAT, PT/OT

## 2023-03-30 NOTE — PHYSICAL THERAPY INITIAL EVALUATION ADULT - LEVEL OF INDEPENDENCE: SUPINE/SIT, REHAB EVAL
[facility] at [address], [id number] [place] [address] Road, [address] [place] [address] Abi Bartlett Cooper: [phone]  F: [phone]    Reason for Visit:   John Thomson is a 41 y.o. female who is seen by synchronous (real-time) audio-video technology on 2021 for consultation at the request of Trisha Umaña NP for anemia    History of Present Illness:   Patient is a 41 y.o. female seen for above  She has had anemia since 2018 with gradual worsening. She states that she has fatigue for months, she has heavy menstrual cycles for at least 20 years. She does have endometriosis but unsure why she has menorrhagia. She has menstrual blood losses for 8 days a month and she has blood clots. She has no h/o other excessive bleeding. She has some PATE, she does have vertigo too and gets dizzy from time to time. She is not pregnant. She craves ice  She has been taking slow Fe since 2021 and thinks that has helped with her energy  She has no other bleeding, no fevers, chills, sweats, weight changes, has occasional nausea.  She has no diarrhea    Mother has factor V Leiden and is on warfarin  Past Medical History:   Diagnosis Date    Anemia     Asthma     Depression     anxiety in the past    Endometriosis 2002    GERD (gastroesophageal reflux disease)     Insomnia       Past Surgical History:   Procedure Laterality Date    [facility] PROBE DOPPLER LAPROSCOPIC DISP  2008    HX GYN  2018    Myomectomy    HX ORTHOPAEDIC  2002    left shoulder      Social History     Tobacco Use    Smoking status: Former Smoker     Quit date: 2011     Years since quittin.7    Smokeless tobacco: Never Used   Substance Use Topics    Alcohol use: Yes     Comment: not often      Family History   Problem Relation Age of Onset    Asthma Mother     Heart Disease Paternal Grandmother     Gout Paternal Grandmother     Cancer Paternal Grandfather     Arthritis-osteo Maternal Grandfather      Current Outpatient Medications   Medication Sig    omeprazole (PRILOSEC) 40 mg capsule Take 1 Cap by mouth daily.  zolpidem (AMBIEN) 5 mg tablet Take 1 Tab by mouth nightly as needed for Sleep. Max Daily Amount: 5 mg.  ferrous sulfate (SLOW FE) 142 mg (45 mg iron) ER tablet Please give whatever slow iron you have    ergocalciferol (ERGOCALCIFEROL) 50,000 unit capsule Take 1 Cap by mouth every seven (7) days. No current facility-administered medications for this visit. Allergies   Allergen Reactions    Mold Hives    Penicillins Hives        Review of Systems: A complete review of systems was obtained, negative except as described above. Physical Exam:     Due to this being a TeleHealth evaluation, many elements of the physical examination are unable to be assessed. Constitutional: Appears well-developed and well-nourished in no apparent distress   Mental status: Alert and awake, Oriented to person/place/time, Able to follow commands  Eyes: EOM normal, Sclera normal, No visible ocular discharge  Neurological: No facial asymmetry (Cranial nerve 7 motor function), No gaze palsy  Skin: No significant exanthematous lesions or discoloration noted on facial skin  Psychiatric: Normal affect, normal judgment/insight. No hallucinations       Results:     Lab Results   Component Value Date/Time    WBC 8.4 12/09/2020 09:40 PM    HGB 7.8 (L) 12/09/2020 09:40 PM    HCT 27.3 (L) 12/09/2020 09:40 PM    PLATELET 433 99/57/9439 09:40 PM    MCV 69.6 (L) 12/09/2020 09:40 PM    ABS.  NEUTROPHILS 4.1 12/09/2020 09:40 PM     Lab Results   Component Value Date/Time    Sodium 138 12/09/2020 09:40 PM    Potassium 3.7 12/09/2020 09:40 PM    Chloride 107 12/09/2020 09:40 PM    CO2 28 12/09/2020 09:40 PM    Glucose 99 12/09/2020 09:40 PM    BUN 12 12/09/2020 09:40 PM    Creatinine 0.79 12/09/2020 09:40 PM    GFR est AA >60 12/09/2020 09:40 PM    GFR est non-AA >60 12/09/2020 09:40 PM    Calcium 8.7 12/09/2020 09:40 PM     Lab Results Component Value Date/Time    Bilirubin, total 0.2 12/09/2020 09:40 PM    ALT (SGPT) 19 12/09/2020 09:40 PM    Alk. phosphatase 52 12/09/2020 09:40 PM    Protein, total 7.2 12/09/2020 09:40 PM    Albumin 3.8 12/09/2020 09:40 PM    Globulin 3.4 12/09/2020 09:40 PM     Lab Results   Component Value Date/Time    Iron % saturation 2 (L) 12/14/2020 09:30 AM    TIBC 426 12/14/2020 09:30 AM    TSH 1.330 08/01/2018 09:21 AM     Lab Results   Component Value Date/Time    D-Dimer 0.64 (H) 12/07/2020 12:00 AM       Records reviewed and summarized above. Pathology report(s) reviewed above. Radiology report(s) reviewed above. Assessment:   1) Iron deficiency anemia    Secondary to blood loss from menorrhagia  Symptomatic and severe anemia  Will administer IV iron  Reviewed side effects    2) Menorrhagia  No other h/o bleeding  Follows with Gyn    3) Vertigo    4) FH of FVLieden  No personal h/o blood clots  However if estrogen containing contraceptives are considered testing is indicated        Plan:     · Injectafer x 2  · Multivitamins daily  · RTC 3 months with cbc, iron profile and b12    I appreciate the opportunity to participate in Ms. [address] care. The patient was seen by synchronous (real-time) audio-video technology. I was in the office while conducting this encounter. The patient was at her home. Consent:  She and/or her healthcare decision maker is aware that this patient-initiated Telehealth encounter is a billable service, with coverage as determined by her insurance carrier.  She is aware that she may receive a bill and has provided verbal consent to proceed: Yes    Pursuant to the emergency declaration under the [address] and the [address] authority and the [employer] General Act, this Virtual Visit was conducted, with patient's (and/or legal guardian's) consent, to reduce the patient's risk of exposure to COVID-19 and provide necessary medical care.     Signed By: Valerie Toussaint MD moderate assist (50% patients effort)

## 2023-03-30 NOTE — PROGRESS NOTE ADULT - ASSESSMENT
88 yr old female with anxiety/depression, Parkinson's disease, mitral valve replacement, atrial fibrillation presented after a mechanical fall at home in her bathroom, without any loss of consciousness. Noted to have a left intertrochanteric fracture, evaluated by orthopedics and underwent left hip IMN. In ED, CTA chest was done, no pulmonary embolism noted. Noted to have hypoxia. Intraoperatively, noted to have episodes of hypotension, required transient pressor and fluid boluses with improvement in BPs. EBL of 200 cc.    1. Left intertrochanteric fracture s/p IMN:  Pain control  Lovenox in am per orthopedics  PT evaluation  incentive spirometry.    2. Acute hypoxic respiratory failure, hypotension:  Supplemental oxygen  CTA chest negative.  check ECHO  no clinical signs of fluid overload  will consider cardiology evaluation pending ECHO.  given transient requirement for pressors in OR, will monitor in step down unit post op.  monitor urine output.  Post op labs noted, trend Hb.    3. Parkinson's disease:  Sinemet    4. Atrial fibrillation:  Resume beta blockers  telemetry  ECHO.    5. Anxiety/depression:  Resume Escitalopram.    6. DVT ppx:  Lovenox

## 2023-03-30 NOTE — OCCUPATIONAL THERAPY INITIAL EVALUATION ADULT - PERTINENT HX OF CURRENT PROBLEM, REHAB EVAL
As per MD note: 88 yr old female with anxiety/depression, Parkinson's disease, mitral valve replacement, atrial fibrillation presented after a mechanical fall at home in her bathroom, without any loss of consciousness. Noted to have a left intertrochanteric fracture, evaluated by orthopedics and underwent left hip IMN. In ED, CTA chest was done, no pulmonary embolism noted. Noted to have hypoxia. Intraoperatively, noted to have episodes of hypotension, required transient pressor and fluid boluses with improvement in BPs. EBL of 200 cc.

## 2023-03-31 LAB
24R-OH-CALCIDIOL SERPL-MCNC: 27.6 NG/ML — LOW (ref 30–80)
ANION GAP SERPL CALC-SCNC: 9 MMOL/L — SIGNIFICANT CHANGE UP (ref 5–17)
BLD GP AB SCN SERPL QL: SIGNIFICANT CHANGE UP
BUN SERPL-MCNC: 16.1 MG/DL — SIGNIFICANT CHANGE UP (ref 8–20)
CALCIUM SERPL-MCNC: 7.8 MG/DL — LOW (ref 8.4–10.5)
CHLORIDE SERPL-SCNC: 104 MMOL/L — SIGNIFICANT CHANGE UP (ref 96–108)
CO2 SERPL-SCNC: 28 MMOL/L — SIGNIFICANT CHANGE UP (ref 22–29)
CREAT SERPL-MCNC: 0.49 MG/DL — LOW (ref 0.5–1.3)
EGFR: 92 ML/MIN/1.73M2 — SIGNIFICANT CHANGE UP
GLUCOSE SERPL-MCNC: 94 MG/DL — SIGNIFICANT CHANGE UP (ref 70–99)
HCT VFR BLD CALC: 24 % — LOW (ref 34.5–45)
HGB BLD-MCNC: 7.9 G/DL — LOW (ref 11.5–15.5)
IRON SATN MFR SERPL: 24 UG/DL — LOW (ref 37–145)
MAGNESIUM SERPL-MCNC: 1.9 MG/DL — SIGNIFICANT CHANGE UP (ref 1.6–2.6)
MCHC RBC-ENTMCNC: 31.6 PG — SIGNIFICANT CHANGE UP (ref 27–34)
MCHC RBC-ENTMCNC: 32.9 GM/DL — SIGNIFICANT CHANGE UP (ref 32–36)
MCV RBC AUTO: 96 FL — SIGNIFICANT CHANGE UP (ref 80–100)
NT-PROBNP SERPL-SCNC: 5882 PG/ML — HIGH (ref 0–300)
PLATELET # BLD AUTO: 156 K/UL — SIGNIFICANT CHANGE UP (ref 150–400)
POTASSIUM SERPL-MCNC: 3.5 MMOL/L — SIGNIFICANT CHANGE UP (ref 3.5–5.3)
POTASSIUM SERPL-SCNC: 3.5 MMOL/L — SIGNIFICANT CHANGE UP (ref 3.5–5.3)
RBC # BLD: 2.5 M/UL — LOW (ref 3.8–5.2)
RBC # FLD: 13.4 % — SIGNIFICANT CHANGE UP (ref 10.3–14.5)
SODIUM SERPL-SCNC: 141 MMOL/L — SIGNIFICANT CHANGE UP (ref 135–145)
TIBC SERPL-MCNC: 167 UG/DL — LOW (ref 220–430)
TRANSFERRIN SERPL-MCNC: 117 MG/DL — LOW (ref 192–382)
TROPONIN T SERPL-MCNC: <0.01 NG/ML — SIGNIFICANT CHANGE UP (ref 0–0.06)
TSH SERPL-MCNC: 1.43 UIU/ML — SIGNIFICANT CHANGE UP (ref 0.27–4.2)
VIT B12 SERPL-MCNC: 369 PG/ML — SIGNIFICANT CHANGE UP (ref 232–1245)
WBC # BLD: 8.44 K/UL — SIGNIFICANT CHANGE UP (ref 3.8–10.5)
WBC # FLD AUTO: 8.44 K/UL — SIGNIFICANT CHANGE UP (ref 3.8–10.5)

## 2023-03-31 PROCEDURE — 93010 ELECTROCARDIOGRAM REPORT: CPT

## 2023-03-31 PROCEDURE — 99232 SBSQ HOSP IP/OBS MODERATE 35: CPT

## 2023-03-31 PROCEDURE — 71045 X-RAY EXAM CHEST 1 VIEW: CPT | Mod: 26

## 2023-03-31 RX ORDER — FUROSEMIDE 40 MG
40 TABLET ORAL ONCE
Refills: 0 | Status: COMPLETED | OUTPATIENT
Start: 2023-03-31 | End: 2023-03-31

## 2023-03-31 RX ORDER — POTASSIUM CHLORIDE 20 MEQ
40 PACKET (EA) ORAL ONCE
Refills: 0 | Status: COMPLETED | OUTPATIENT
Start: 2023-03-31 | End: 2023-03-31

## 2023-03-31 RX ORDER — MAGNESIUM SULFATE 500 MG/ML
2 VIAL (ML) INJECTION ONCE
Refills: 0 | Status: COMPLETED | OUTPATIENT
Start: 2023-03-31 | End: 2023-03-31

## 2023-03-31 RX ORDER — FUROSEMIDE 40 MG
40 TABLET ORAL DAILY
Refills: 0 | Status: DISCONTINUED | OUTPATIENT
Start: 2023-03-31 | End: 2023-04-01

## 2023-03-31 RX ORDER — ERGOCALCIFEROL 1.25 MG/1
50000 CAPSULE ORAL
Refills: 0 | Status: DISCONTINUED | OUTPATIENT
Start: 2023-03-31 | End: 2023-04-03

## 2023-03-31 RX ORDER — TAMSULOSIN HYDROCHLORIDE 0.4 MG/1
0.4 CAPSULE ORAL AT BEDTIME
Refills: 0 | Status: DISCONTINUED | OUTPATIENT
Start: 2023-03-31 | End: 2023-04-03

## 2023-03-31 RX ORDER — POTASSIUM PHOSPHATE, MONOBASIC POTASSIUM PHOSPHATE, DIBASIC 236; 224 MG/ML; MG/ML
15 INJECTION, SOLUTION INTRAVENOUS ONCE
Refills: 0 | Status: COMPLETED | OUTPATIENT
Start: 2023-03-31 | End: 2023-03-31

## 2023-03-31 RX ORDER — ASPIRIN/CALCIUM CARB/MAGNESIUM 324 MG
81 TABLET ORAL DAILY
Refills: 0 | Status: DISCONTINUED | OUTPATIENT
Start: 2023-03-31 | End: 2023-03-31

## 2023-03-31 RX ORDER — ASPIRIN/CALCIUM CARB/MAGNESIUM 324 MG
162 TABLET ORAL DAILY
Refills: 0 | Status: DISCONTINUED | OUTPATIENT
Start: 2023-03-31 | End: 2023-04-03

## 2023-03-31 RX ADMIN — TAMSULOSIN HYDROCHLORIDE 0.4 MILLIGRAM(S): 0.4 CAPSULE ORAL at 20:53

## 2023-03-31 RX ADMIN — CARBIDOPA AND LEVODOPA 1.5 TABLET(S): 25; 100 TABLET ORAL at 17:35

## 2023-03-31 RX ADMIN — Medication 1 PACKET(S): at 05:26

## 2023-03-31 RX ADMIN — Medication 162 MILLIGRAM(S): at 14:10

## 2023-03-31 RX ADMIN — Medication 650 MILLIGRAM(S): at 21:52

## 2023-03-31 RX ADMIN — CARBIDOPA AND LEVODOPA 1.5 TABLET(S): 25; 100 TABLET ORAL at 09:12

## 2023-03-31 RX ADMIN — CARBIDOPA AND LEVODOPA 1.5 TABLET(S): 25; 100 TABLET ORAL at 14:11

## 2023-03-31 RX ADMIN — ENOXAPARIN SODIUM 40 MILLIGRAM(S): 100 INJECTION SUBCUTANEOUS at 20:52

## 2023-03-31 RX ADMIN — POTASSIUM PHOSPHATE, MONOBASIC POTASSIUM PHOSPHATE, DIBASIC 62.5 MILLIMOLE(S): 236; 224 INJECTION, SOLUTION INTRAVENOUS at 09:13

## 2023-03-31 RX ADMIN — Medication 25 MILLIGRAM(S): at 05:26

## 2023-03-31 RX ADMIN — Medication 62.5 MILLIMOLE(S): at 19:36

## 2023-03-31 RX ADMIN — Medication 650 MILLIGRAM(S): at 01:29

## 2023-03-31 RX ADMIN — Medication 40 MILLIEQUIVALENT(S): at 14:10

## 2023-03-31 RX ADMIN — CARBIDOPA AND LEVODOPA 1.5 TABLET(S): 25; 100 TABLET ORAL at 05:26

## 2023-03-31 RX ADMIN — ESCITALOPRAM OXALATE 10 MILLIGRAM(S): 10 TABLET, FILM COATED ORAL at 14:10

## 2023-03-31 RX ADMIN — Medication 81 MILLIGRAM(S): at 09:11

## 2023-03-31 RX ADMIN — Medication 650 MILLIGRAM(S): at 20:52

## 2023-03-31 RX ADMIN — Medication 40 MILLIGRAM(S): at 19:36

## 2023-03-31 RX ADMIN — CARBIDOPA AND LEVODOPA 1.5 TABLET(S): 25; 100 TABLET ORAL at 20:53

## 2023-03-31 RX ADMIN — Medication 650 MILLIGRAM(S): at 00:29

## 2023-03-31 RX ADMIN — Medication 25 GRAM(S): at 09:12

## 2023-03-31 NOTE — PROGRESS NOTE ADULT - SUBJECTIVE AND OBJECTIVE BOX
ORTHOPEDIC POST-OP PROGRESS NOTE:    Name: GARIMA ADAMS    MR #: [id number]    Procedure: Intramedulary nail of left him IM nail       DOS: 3/28/23      Pt comfortable without complaints, pain controlled. Denies CP, SOB, N/V, numbness/tingling           Vital Signs Last 24 Hrs  T(C): 36.7 (03-31-23 @ 04:17), Max: 36.7 (03-31-23 @ 04:17)  T(F): 98 (03-31-23 @ 04:17), Max: 98 (03-31-23 @ 04:17)  HR: 95 (03-31-23 @ 04:17) (95 - 95)  BP: 118/69 (03-31-23 @ 04:17) (118/69 - 118/69)  BP(mean): --  RR: 18 (03-31-23 @ 04:17) (18 - 18)  SpO2: 96% (03-31-23 @ 04:17) (96% - 96%)      General Exam:    General: Pt Alert and oriented, NAD, controlled pain.    Dressings C/D/I. No bleeding. No erythema.    Skin: No erythema. No wound dehisence.    Pulses: 2+ dorsalis pedis pulse. Cap refill < 2 sec.    Sensation: Grossly intact to light touch without deficit.    Motor: No motor weaknesses found.        A/P: 88y Female  POD# 3 s/p  Left hip IM nail     - Stable  - Pain Control  - DVT ppx as prescribed - lov 40 mg daily   - PT eval  - Weight bearing status: WBAT

## 2023-03-31 NOTE — CHART NOTE - NSCHARTNOTEFT_GEN_A_CORE
Patient is a 88y old  Female who presents with a chief complaint of lt. hip fracture (30 Mar 2023) with sx repair   Problem:  RN notified provider of rhythm change  currently in Afib rate controlled  Pt seen at bedside no distress noted stated she has a history of Afib and not on anticoagulation with risk of falls.  Chart reviewed  and confirmed hx of paroxsymal Afib with no anticoagulation except ASA.   Provider updated    resumed ASA

## 2023-03-31 NOTE — PROGRESS NOTE ADULT - SUBJECTIVE AND OBJECTIVE BOX
Patient is a 88y old  Female who presents with a chief complaint of lt. hip fracture (30 Mar 2023 13:08)      Patient seen and examined at bedside. No overnight events reported.     ALLERGIES:  amiodarone (Hives)  Cipro (Muscle Pain)  diltiazem (Hives)  strawberry (Rash)    MEDICATIONS  (STANDING):  carbidopa/levodopa  25/100 1.5 Tablet(s) Oral <User Schedule>  chlorhexidine 2% Cloths 1 Application(s) Topical every 12 hours  enoxaparin Injectable 40 milliGRAM(s) SubCutaneous every 24 hours  escitalopram 10 milliGRAM(s) Oral daily  magnesium sulfate  IVPB 2 Gram(s) IV Intermittent once  metoprolol succinate ER 25 milliGRAM(s) Oral daily  potassium phosphate IVPB 15 milliMole(s) IV Intermittent once    MEDICATIONS  (PRN):  acetaminophen     Tablet .. 650 milliGRAM(s) Oral every 6 hours PRN Mild Pain (1 - 3)  aluminum hydroxide/magnesium hydroxide/simethicone Suspension 30 milliLiter(s) Oral four times a day PRN Indigestion  bisacodyl Suppository 10 milliGRAM(s) Rectal daily PRN If no bowel movement  magnesium hydroxide Suspension 30 milliLiter(s) Oral daily PRN Constipation  ondansetron Injectable 4 milliGRAM(s) IV Push every 6 hours PRN Nausea and/or Vomiting  oxyCODONE    IR 5 milliGRAM(s) Oral every 4 hours PRN Severe Pain (7 - 10)  oxyCODONE    IR 2.5 milliGRAM(s) Oral every 4 hours PRN Moderate Pain (4 - 6)    Vital Signs Last 24 Hrs  T(F): 98 (31 Mar 2023 04:17), Max: 98.1 (30 Mar 2023 15:20)  HR: 95 (31 Mar 2023 04:17) (83 - 98)  BP: 118/69 (31 Mar 2023 04:17) (106/38 - 122/75)  RR: 18 (31 Mar 2023 04:17) (16 - 18)  SpO2: 96% (31 Mar 2023 04:17) (93% - 99%)  I&O's Summary    30 Mar 2023 07:01  -  31 Mar 2023 07:00  --------------------------------------------------------  IN: 300 mL / OUT: 400 mL / NET: -100 mL        PHYSICAL EXAM:  General:   ENT:   Neck:   Lungs:   Cardio: RRR, S1/S2, No murmur  Abdomen: Soft, Nontender, Nondistended; Bowel sounds present  Extremities: No calf tenderness, No pitting edema    LABS:                        8.2    8.50  )-----------( 115      ( 30 Mar 2023 04:55 )             25.5     03-30    141  |  107  |  16.4  ----------------------------<  104  3.8   |  22.0  |  0.52    Ca    7.6      30 Mar 2023 04:55  Phos  1.8     03-30  Mg     1.7     03-30                                            COVID-19 PCR: Elizabettemelvin (03-27-23 @ 21:55)    RADIOLOGY & ADDITIONAL TESTS:       Patient is a 88y old  Female who presents with a chief complaint of lt. hip fracture (30 Mar 2023 13:08)      Patient seen and examined at bedside in am , she is with aptrial fib on monitor earlier today back to sinus now   she is c/o pain in the left thigh hip area   chart reviewed         ALLERGIES:  amiodarone (Hives)  Cipro (Muscle Pain)  diltiazem (Hives)  strawberry (Rash)    MEDICATIONS  (STANDING):  carbidopa/levodopa  25/100 1.5 Tablet(s) Oral <User Schedule>  chlorhexidine 2% Cloths 1 Application(s) Topical every 12 hours  enoxaparin Injectable 40 milliGRAM(s) SubCutaneous every 24 hours  escitalopram 10 milliGRAM(s) Oral daily  magnesium sulfate  IVPB 2 Gram(s) IV Intermittent once  metoprolol succinate ER 25 milliGRAM(s) Oral daily  potassium phosphate IVPB 15 milliMole(s) IV Intermittent once    MEDICATIONS  (PRN):  acetaminophen     Tablet .. 650 milliGRAM(s) Oral every 6 hours PRN Mild Pain (1 - 3)  aluminum hydroxide/magnesium hydroxide/simethicone Suspension 30 milliLiter(s) Oral four times a day PRN Indigestion  bisacodyl Suppository 10 milliGRAM(s) Rectal daily PRN If no bowel movement  magnesium hydroxide Suspension 30 milliLiter(s) Oral daily PRN Constipation  ondansetron Injectable 4 milliGRAM(s) IV Push every 6 hours PRN Nausea and/or Vomiting  oxyCODONE    IR 5 milliGRAM(s) Oral every 4 hours PRN Severe Pain (7 - 10)  oxyCODONE    IR 2.5 milliGRAM(s) Oral every 4 hours PRN Moderate Pain (4 - 6)    Vital Signs Last 24 Hrs  T(F): 98 (31 Mar 2023 04:17), Max: 98.1 (30 Mar 2023 15:20)  HR: 95 (31 Mar 2023 04:17) (83 - 98)  BP: 118/69 (31 Mar 2023 04:17) (106/38 - 122/75)  RR: 18 (31 Mar 2023 04:17) (16 - 18)  SpO2: 96% (31 Mar 2023 04:17) (93% - 99%)  I&O's Summary    30 Mar 2023 07:01  -  31 Mar 2023 07:00  --------------------------------------------------------  IN: 300 mL / OUT: 400 mL / NET: -100 mL        PHYSICAL EXAM:  General: awake alert , pale color   ENT: mouth normal mucosa   Neck: supple , no JVD   Lungs: CTA bilateral   Cardio: RRR, S1/S2, No murmur  Abdomen: Soft, Nontender, Nondistended; Bowel sounds present  Extremities: No calf tenderness, No pitting edema  Musculoskletal : lft thigh hip dressing in place , swollen left thigh around surgery site   + skin ecchymoses                           7.9    8.44  )-----------( 156      ( 31 Mar 2023 08:22 )             24.0   03-31    141  |  104  |  16.1  ----------------------------<  94  3.5   |  28.0  |  0.49<L>    Ca    7.8<L>      31 Mar 2023 08:22  Phos  1.8     03-30  Mg     1.7     03-30        LABS:                        8.2    8.50  )-----------( 115      ( 30 Mar 2023 04:55 )             25.5     03-30    141  |  107  |  16.4  ----------------------------<  104  3.8   |  22.0  |  0.52    Ca    7.6      30 Mar 2023 04:55  Phos  1.8     03-30  Mg     1.7     03-30                COVID-19 PCR: Elizabettec (03-27-23 @ 21:55)    RADIOLOGY & ADDITIONAL TESTS:

## 2023-03-31 NOTE — CONSULT NOTE ADULT - SUBJECTIVE AND OBJECTIVE BOX
[place] [facility], THE HEART CENTER                                   [address] [place] [address]                                                      PHONE: [phone]                                                         FAX: [phone]  [email or web address]  ---------------------------------------------------------------------------------------------------------------------------------    Reason for Consult: atrial fibrillation     HPI:  GARIMA ADAMS is an 88y Female with history of Parkinson's, paroxysmal atrial fibrillation refused anticoagulation, bio MVR who presents with left hip fracture after fall s/p IM nailing. Intraoperative noted to have episodes of hypotension requiring transient pressor and fluid boluses with improvement. Hemoglobin has been downtrending and now receiving PRBC.     This morning, patient noted to have gone into atrial fibrillation with controlled VR of 90-100s. She did feel palpitations. She has since converted back to SR 90s. Since this morning, she has also had increased shortness of breath which is new. No chest pain.     PAST MEDICAL & SURGICAL HISTORY:  Parkinson disease  followed by neurolohgist ,last week 2 weeks ago carotid doppler done - "normal"      PAF (paroxysmal atrial fibrillation)      Stress incontinence      Chronic GERD      Other specified noninflammatory disorders of vagina      H/O mitral valve replacement  2012      Anxiety and depression      Parkinson disease      PAF (paroxysmal atrial fibrillation)  no AC on aspirin      H/O irritable bowel syndrome      Squamous cell carcinoma of skin      S/P hysterectomy      S/P tonsillectomy      S/P small bowel resection      History of femur fracture  Repaired in 6/ 2019      H/O mitral valve replacement  2012--[facility] -- Serial # [id number], Model # [id number], implant date: May 3, 2012    Implanted Physician : Italo Jones -[facility].      H/O resection of small bowel      H/O total hysterectomy      History of tonsillectomy and adenoidectomy      Other specified noninflammatory disorder of vagina  s/p excision of vaginal lesion- benign - 2019          amiodarone (Hives)  Cipro (Muscle Pain)  diltiazem (Hives)  strawberry (Rash)      MEDICATIONS  (STANDING):  aspirin enteric coated 162 milliGRAM(s) Oral daily  carbidopa/levodopa  25/100 1.5 Tablet(s) Oral <User Schedule>  chlorhexidine 2% Cloths 1 Application(s) Topical every 12 hours  enoxaparin Injectable 40 milliGRAM(s) SubCutaneous every 24 hours  ergocalciferol 51819 Unit(s) Oral every week  escitalopram 10 milliGRAM(s) Oral daily  furosemide   Injectable 40 milliGRAM(s) IV Push daily  metoprolol succinate ER 25 milliGRAM(s) Oral daily  sodium phosphate 15 milliMole(s)/250 mL IVPB 15 milliMole(s) IV Intermittent once  tamsulosin 0.4 milliGRAM(s) Oral at bedtime    MEDICATIONS  (PRN):  acetaminophen     Tablet .. 650 milliGRAM(s) Oral every 6 hours PRN Mild Pain (1 - 3)  aluminum hydroxide/magnesium hydroxide/simethicone Suspension 30 milliLiter(s) Oral four times a day PRN Indigestion  bisacodyl Suppository 10 milliGRAM(s) Rectal daily PRN If no bowel movement  magnesium hydroxide Suspension 30 milliLiter(s) Oral daily PRN Constipation  ondansetron Injectable 4 milliGRAM(s) IV Push every 6 hours PRN Nausea and/or Vomiting  oxyCODONE    IR 5 milliGRAM(s) Oral every 4 hours PRN Severe Pain (7 - 10)  oxyCODONE    IR 2.5 milliGRAM(s) Oral every 4 hours PRN Moderate Pain (4 - 6)      Social History:      ROS: Negative other than as mentioned in HPI.    Vital Signs Last 24 Hrs  T(C): 36.7 (31 Mar 2023 16:10), Max: 36.7 (31 Mar 2023 04:17)  T(F): 98.1 (31 Mar 2023 16:10), Max: 98.1 (31 Mar 2023 14:49)  HR: 83 (31 Mar 2023 16:10) (81 - 95)  BP: 109/60 (31 Mar 2023 16:10) (104/64 - 119/68)  BP(mean): --  RR: 18 (31 Mar 2023 16:10) (18 - 18)  SpO2: 94% (31 Mar 2023 16:10) (89% - 96%)    Parameters below as of 31 Mar 2023 16:10  Patient On (Oxygen Delivery Method): nasal cannula  O2 Flow (L/min): 2    ICU Vital Signs Last 24 Hrs  GARIMA ADAMS  I&O's Detail    30 Mar 2023 07:01  -  31 Mar 2023 07:00  --------------------------------------------------------  IN:    sodium chloride 0.9%: 300 mL  Total IN: 300 mL    OUT:    Indwelling Catheter - Urethral (mL): 250 mL    Voided (mL): 150 mL  Total OUT: 400 mL    Total NET: -100 mL      31 Mar 2023 07:01  -  31 Mar 2023 18:40  --------------------------------------------------------  IN:  Total IN: 0 mL    OUT:    Intermittent Catheterization - Urethral (mL): 500 mL    Voided (mL): 400 mL  Total OUT: 900 mL    Total NET: -900 mL        I&O's Summary    30 Mar 2023 07:01  -  31 Mar 2023 07:00  --------------------------------------------------------  IN: 300 mL / OUT: 400 mL / NET: -100 mL    31 Mar 2023 07:01  -  31 Mar 2023 18:40  --------------------------------------------------------  IN: 0 mL / OUT: 900 mL / NET: -900 mL      Drug Dosing Weight  GARIMA ADAMS      PHYSICAL EXAM:  General: Tachypneic, speaks in short sentences  HEENT: Head; normocephalic, atraumatic.  Eyes: Pupils reactive, cornea wnl.  Neck: Supple, no adenopathy, or carotid bruit or thyromegaly.  CARDIOVASCULAR: Elevated JVP. Regular S1 S2   LUNGS: Bilateral crackles  ABDOMEN: Soft, nontender without mass or organomegaly. Bowel sounds normoactive.  EXTREMITIES: No clubbing, cyanosis or edema. Distal pulses wnl.   SKIN: warm and dry with normal turgor.  NEURO: Alert/oriented x 3/normal motor exam. No pathologic reflexes.    PSYCH: Appropriate affect.        LABS:                        7.9    8.44  )-----------( 156      ( 31 Mar 2023 08:22 )             24.0     03-31    141  |  104  |  16.1  ----------------------------<  94  3.5   |  28.0  |  0.49<L>    Ca    7.8<L>      31 Mar 2023 08:22  Phos  1.8     03-30  Mg     1.9     03-31      AGRIMA ADAMS            RADIOLOGY & ADDITIONAL STUDIES:    INTERPRETATION OF TELEMETRY (personally reviewed):  SR    ECG:  SR first degree, NSST changes    ECHO:  < from: TTE Echo Complete w/o Contrast w/ Doppler (03.29.23 @ 12:46) >  PHYSICIAN INTERPRETATION:  Left Ventricle: The left ventricular internal cavity size is normal. Left   ventricular wall thickness is normal.  Global LV systolic function was normal. Left ventricular ejection   fraction, by visual estimation, is 60 to 65%. Spectral Doppler shows   pseudonormal pattern of left ventricular myocardial filling (Grade II   diastolic dysfunction). Elevated mean left atrial pressure.  Right Ventricle: The right ventricular size is normal. RV systolic   function is normal.  Left Atrium: Normal left atrial size.  Right Atrium: Mildly enlarged right atrium.  Pericardium: There is no evidence of pericardial effusion.  Mitral Valve: A bioprosthetic valve is present in the mitral position. No   evidence of mitral valve regurgitation is seen. Bio MVR seated well with   normal gradients. No detectable MR.  Tricuspid Valve: The tricuspid valve is normal in structure. Moderate   tricuspid regurgitation is visualized. Estimated pulmonary artery   systolic pressure is 55.9 mmHg assuming a right atrial pressure of 8   mmHg, which is consistent with moderate pulmonary hypertension.  Aortic Valve: The aortic valve is trileaflet. Sclerotic aortic valve with   normal opening. Trivial aortic valve regurgitation is seen.  Pulmonic Valve: The pulmonic valve is normal. Trace pulmonic valve   regurgitation.  Aorta: The aortic root and ascending aorta are structurally normal, with   no evidence of dilitation.  Pulmonary Artery: The pulmonary artery is mildly dilated.  Venous: The inferior vena cava was normal sized, with respiratory size   variation less than 50%.      Summary:   1. Left ventricular ejection fraction, by visual estimation, is 60 to   65%.   2. Normal global left ventricular systolic function.   3. Elevated mean left atrial pressure.   4. Spectral Doppler shows pseudonormal pattern of left ventricular   myocardial filling (Grade II diastolic dysfunction).   5. Mildly enlarged right atrium.   6. Normal left atrial size.   7. A bioprosthetic valve is present in the mitral position.   8. Bio MVR seated well with normal gradients. No detectable MR.   9. No evidence of mitral valveregurgitation.  10. Moderate tricuspid regurgitation.  11. Estimated pulmonary artery systolic pressure is 55.9 mmHg assuming a   right atrial pressure of 8 mmHg, which is consistent with moderate   pulmonary hypertension.  12. Mildly dilated pulmonary artery.  13. Sclerotic aortic valve with normal opening.    MD Abdoulaye Electronically signed on 3/29/2023 at 3:04:03 PM    < end of copied text >    Assessment and Plan:  In summary, GARIMA ADAMS is an 88y Female with history of Parkinson's, paroxysmal atrial fibrillation refused anticoagulation, bio MVR who presents with left hip fracture after fall s/p IM nailing, hospital course c/b paroxysmal atrial fibrillation with controlled VR, intraop hypotension requiring pressor and fluid boluses, anemia likely from acute surgical blood loss, and new onset shortness of breath. Suspect shortness of breath secondary to acute congestive heart failure given physical exam. Will rule out ischemic evenet    - Lasix 40 mg IV now  - May need more after PRBC transfusion  - Continue lasix 40 mg IV daily  - CXR  - BNP, trop  - Repeat EKG and limited echo  - Continue Toprol 25 mg daily  - If CXR inconsistent with CHF, would obtain CTA to rule out PE                 [place] [facility], THE HEART CENTER                                   [address] [place] [address]                                                      PHONE: [phone]                                                         FAX: [phone]  [email or web address]  ---------------------------------------------------------------------------------------------------------------------------------    Reason for Consult: atrial fibrillation     HPI:  GARIMA ADAMS is an 88y Female with history of Parkinson's, paroxysmal atrial fibrillation refused anticoagulation, bio MVR who presents with left hip fracture after fall s/p IM nailing. Intraoperative noted to have episodes of hypotension requiring transient pressor and fluid boluses with improvement. Hemoglobin has been downtrending and now receiving PRBC.     This morning, patient noted to have gone into atrial fibrillation with controlled VR of 90-100s. She did feel palpitations. She has since converted back to SR 90s. Since this morning, she has also had increased shortness of breath which is new. No chest pain.     PAST MEDICAL & SURGICAL HISTORY:  Parkinson disease  followed by neurolohgist ,last week 2 weeks ago carotid doppler done - "normal"      PAF (paroxysmal atrial fibrillation)      Stress incontinence      Chronic GERD      Other specified noninflammatory disorders of vagina      H/O mitral valve replacement  2012      Anxiety and depression      Parkinson disease      PAF (paroxysmal atrial fibrillation)  no AC on aspirin      H/O irritable bowel syndrome      Squamous cell carcinoma of skin      S/P hysterectomy      S/P tonsillectomy      S/P small bowel resection      History of femur fracture  Repaired in 6/ 2019      H/O mitral valve replacement  2012--[facility] -- Serial # [id number], Model # [id number], implant date: May 3, 2012    Implanted Physician : Italo Jones -[facility].      H/O resection of small bowel      H/O total hysterectomy      History of tonsillectomy and adenoidectomy      Other specified noninflammatory disorder of vagina  s/p excision of vaginal lesion- benign - 2019          amiodarone (Hives)  Cipro (Muscle Pain)  diltiazem (Hives)  strawberry (Rash)      MEDICATIONS  (STANDING):  aspirin enteric coated 162 milliGRAM(s) Oral daily  carbidopa/levodopa  25/100 1.5 Tablet(s) Oral <User Schedule>  chlorhexidine 2% Cloths 1 Application(s) Topical every 12 hours  enoxaparin Injectable 40 milliGRAM(s) SubCutaneous every 24 hours  ergocalciferol 20509 Unit(s) Oral every week  escitalopram 10 milliGRAM(s) Oral daily  furosemide   Injectable 40 milliGRAM(s) IV Push daily  metoprolol succinate ER 25 milliGRAM(s) Oral daily  sodium phosphate 15 milliMole(s)/250 mL IVPB 15 milliMole(s) IV Intermittent once  tamsulosin 0.4 milliGRAM(s) Oral at bedtime    MEDICATIONS  (PRN):  acetaminophen     Tablet .. 650 milliGRAM(s) Oral every 6 hours PRN Mild Pain (1 - 3)  aluminum hydroxide/magnesium hydroxide/simethicone Suspension 30 milliLiter(s) Oral four times a day PRN Indigestion  bisacodyl Suppository 10 milliGRAM(s) Rectal daily PRN If no bowel movement  magnesium hydroxide Suspension 30 milliLiter(s) Oral daily PRN Constipation  ondansetron Injectable 4 milliGRAM(s) IV Push every 6 hours PRN Nausea and/or Vomiting  oxyCODONE    IR 5 milliGRAM(s) Oral every 4 hours PRN Severe Pain (7 - 10)  oxyCODONE    IR 2.5 milliGRAM(s) Oral every 4 hours PRN Moderate Pain (4 - 6)      Social History:      ROS: Negative other than as mentioned in HPI.    Vital Signs Last 24 Hrs  T(C): 36.7 (31 Mar 2023 16:10), Max: 36.7 (31 Mar 2023 04:17)  T(F): 98.1 (31 Mar 2023 16:10), Max: 98.1 (31 Mar 2023 14:49)  HR: 83 (31 Mar 2023 16:10) (81 - 95)  BP: 109/60 (31 Mar 2023 16:10) (104/64 - 119/68)  BP(mean): --  RR: 18 (31 Mar 2023 16:10) (18 - 18)  SpO2: 94% (31 Mar 2023 16:10) (89% - 96%)    Parameters below as of 31 Mar 2023 16:10  Patient On (Oxygen Delivery Method): nasal cannula  O2 Flow (L/min): 2    ICU Vital Signs Last 24 Hrs  GARIMA ADAMS  I&O's Detail    30 Mar 2023 07:01  -  31 Mar 2023 07:00  --------------------------------------------------------  IN:    sodium chloride 0.9%: 300 mL  Total IN: 300 mL    OUT:    Indwelling Catheter - Urethral (mL): 250 mL    Voided (mL): 150 mL  Total OUT: 400 mL    Total NET: -100 mL      31 Mar 2023 07:01  -  31 Mar 2023 18:40  --------------------------------------------------------  IN:  Total IN: 0 mL    OUT:    Intermittent Catheterization - Urethral (mL): 500 mL    Voided (mL): 400 mL  Total OUT: 900 mL    Total NET: -900 mL        I&O's Summary    30 Mar 2023 07:01  -  31 Mar 2023 07:00  --------------------------------------------------------  IN: 300 mL / OUT: 400 mL / NET: -100 mL    31 Mar 2023 07:01  -  31 Mar 2023 18:40  --------------------------------------------------------  IN: 0 mL / OUT: 900 mL / NET: -900 mL      Drug Dosing Weight  GARIMA ADAMS      PHYSICAL EXAM:  General: Tachypneic, speaks in short sentences  HEENT: Head; normocephalic, atraumatic.  Eyes: Pupils reactive, cornea wnl.  Neck: Supple, no adenopathy, or carotid bruit or thyromegaly.  CARDIOVASCULAR: Elevated JVP. Regular S1 S2   LUNGS: Bilateral crackles  ABDOMEN: Soft, nontender without mass or organomegaly. Bowel sounds normoactive.  EXTREMITIES: No clubbing, cyanosis or edema. Distal pulses wnl.   SKIN: warm and dry with normal turgor.  NEURO: Alert/oriented x 3/normal motor exam. No pathologic reflexes.    PSYCH: Appropriate affect.        LABS:                        7.9    8.44  )-----------( 156      ( 31 Mar 2023 08:22 )             24.0     03-31    141  |  104  |  16.1  ----------------------------<  94  3.5   |  28.0  |  0.49<L>    Ca    7.8<L>      31 Mar 2023 08:22  Phos  1.8     03-30  Mg     1.9     03-31      GARIMA ADAMS            RADIOLOGY & ADDITIONAL STUDIES:    INTERPRETATION OF TELEMETRY (personally reviewed):  SR    ECG:  SR first degree, NSST changes    ECHO:  < from: TTE Echo Complete w/o Contrast w/ Doppler (03.29.23 @ 12:46) >  PHYSICIAN INTERPRETATION:  Left Ventricle: The left ventricular internal cavity size is normal. Left   ventricular wall thickness is normal.  Global LV systolic function was normal. Left ventricular ejection   fraction, by visual estimation, is 60 to 65%. Spectral Doppler shows   pseudonormal pattern of left ventricular myocardial filling (Grade II   diastolic dysfunction). Elevated mean left atrial pressure.  Right Ventricle: The right ventricular size is normal. RV systolic   function is normal.  Left Atrium: Normal left atrial size.  Right Atrium: Mildly enlarged right atrium.  Pericardium: There is no evidence of pericardial effusion.  Mitral Valve: A bioprosthetic valve is present in the mitral position. No   evidence of mitral valve regurgitation is seen. Bio MVR seated well with   normal gradients. No detectable MR.  Tricuspid Valve: The tricuspid valve is normal in structure. Moderate   tricuspid regurgitation is visualized. Estimated pulmonary artery   systolic pressure is 55.9 mmHg assuming a right atrial pressure of 8   mmHg, which is consistent with moderate pulmonary hypertension.  Aortic Valve: The aortic valve is trileaflet. Sclerotic aortic valve with   normal opening. Trivial aortic valve regurgitation is seen.  Pulmonic Valve: The pulmonic valve is normal. Trace pulmonic valve   regurgitation.  Aorta: The aortic root and ascending aorta are structurally normal, with   no evidence of dilitation.  Pulmonary Artery: The pulmonary artery is mildly dilated.  Venous: The inferior vena cava was normal sized, with respiratory size   variation less than 50%.      Summary:   1. Left ventricular ejection fraction, by visual estimation, is 60 to   65%.   2. Normal global left ventricular systolic function.   3. Elevated mean left atrial pressure.   4. Spectral Doppler shows pseudonormal pattern of left ventricular   myocardial filling (Grade II diastolic dysfunction).   5. Mildly enlarged right atrium.   6. Normal left atrial size.   7. A bioprosthetic valve is present in the mitral position.   8. Bio MVR seated well with normal gradients. No detectable MR.   9. No evidence of mitral valveregurgitation.  10. Moderate tricuspid regurgitation.  11. Estimated pulmonary artery systolic pressure is 55.9 mmHg assuming a   right atrial pressure of 8 mmHg, which is consistent with moderate   pulmonary hypertension.  12. Mildly dilated pulmonary artery.  13. Sclerotic aortic valve with normal opening.    MD Abdoulaye Electronically signed on 3/29/2023 at 3:04:03 PM    < end of copied text >    Assessment and Plan:  In summary, GARIMA ADAMS is an 88y Female with history of Parkinson's, paroxysmal atrial fibrillation refused anticoagulation, bio MVR who presents with left hip fracture after fall s/p IM nailing, hospital course c/b paroxysmal atrial fibrillation with controlled VR, intraop hypotension requiring pressor and fluid boluses, anemia likely from acute surgical blood loss, and new onset shortness of breath. Suspect shortness of breath secondary to acute congestive heart failure given physical exam. Will rule out ischemic evenet    - Lasix 40 mg IV now  - May need more after PRBC transfusion  - Continue lasix 40 mg IV daily  - CXR  - BNP, trop  - Repeat EKG and limited echo  - Continue Toprol 25 mg daily  - If CXR inconsistent with CHF, would obtain CTA to rule out PE  - Eventual outpatient Watchman evaluation

## 2023-03-31 NOTE — PROGRESS NOTE ADULT - ASSESSMENT
pt. is an 89 y/o female pt. presenting to the emergency department with a chief complaint of left hip pain after a fall today.  Patient reports while in her bathroom she lost her balance and fell backwards onto her left hip. He has unsteady gait due to parkinson's and walks with walker. denies loss of consciousness, no dizziness, reports no head trauma, alert., awake in the ER, pt. stated that she has emergency alert system and called 911. no other injuries. pt. reports no cp or sob when walks around the house with walker. no abd. pain, no n/v/d. live alone but gets help from the aide. pt. stated that she is not on any blood thinners, on aspirin 81 mg po daily, sinemet is 25/100 , 1.5 tabs po 5 times a day now, used to be on 1 tab 4 times a day, Pt also stated that she had MVR 10 years ago. Pt is s/p OR for IM nail ,  Intraoperatively, noted to have episodes of hypotension, required transient pressor and fluid boluses with improvement in BPs. EBL of 200 cc.HB dropped       1- Hypotension in the OR   improving   HB low   will get repeat CBC today   may need blood tx if trending down     2- Anemia of blood loss   repeat CBC tx as needed   check iron studies     3- Left hip fracture,  comminuted left intertrochanteric hip fracture  s/p IM nail on 3/28   cont pain meds as needed   PT daily eventual BLESSING     4-h./o Atrial fib   cont metoprolol for rate control   not on AC   resume asa   TTE result reviewed   mode Phtn , EF normal     5- Parkinosn dx   on sinemet     DVT prophylaxis lovenox     Dc to BLESSING once medically optimized    pt. is an 89 y/o female pt. presenting to the emergency department with a chief complaint of left hip pain after a fall today.  Patient reports while in her bathroom she lost her balance and fell backwards onto her left hip. He has unsteady gait due to parkinson's and walks with walker. denies loss of consciousness, no dizziness, reports no head trauma, alert., awake in the ER, pt. stated that she has emergency alert system and called 911. no other injuries. pt. reports no cp or sob when walks around the house with walker. no abd. pain, no n/v/d. live alone but gets help from the aide. pt. stated that she is not on any blood thinners, on aspirin 81 mg po daily, sinemet is 25/100 , 1.5 tabs po 5 times a day now, used to be on 1 tab 4 times a day, Pt also stated that she had MVR 10 years ago. Pt is s/p OR for IM nail ,  Intraoperatively, noted to have episodes of hypotension, required transient pressor and fluid boluses with improvement in BPs. EBL of 200 cc.HB dropped       1- Hypotension  during OR   improving   HB low   will get repeat CBC today   may need blood tx if trending down     2- Anemia of blood loss   repeat CBC,  tx as needed   check iron studies     3- Left hip fracture,  comminuted left intertrochanteric hip fracture  s/p IM nail on 3/28   cont pain meds as needed   PT daily eventual BLESSING     4-h./o Atrial fib   cont metoprolol for rate control   not on AC   resume asa   TTE result reviewed   mode Phtn , EF normal     5- Parkinosn dx   on sinemet     DVT prophylaxis lovenox     Dc to BLESSING once medically optimized

## 2023-04-01 LAB
ANION GAP SERPL CALC-SCNC: 14 MMOL/L — SIGNIFICANT CHANGE UP (ref 5–17)
BUN SERPL-MCNC: 18 MG/DL — SIGNIFICANT CHANGE UP (ref 8–20)
CALCIUM SERPL-MCNC: 7.7 MG/DL — LOW (ref 8.4–10.5)
CHLORIDE SERPL-SCNC: 96 MMOL/L — SIGNIFICANT CHANGE UP (ref 96–108)
CO2 SERPL-SCNC: 27 MMOL/L — SIGNIFICANT CHANGE UP (ref 22–29)
CREAT SERPL-MCNC: 0.63 MG/DL — SIGNIFICANT CHANGE UP (ref 0.5–1.3)
EGFR: 86 ML/MIN/1.73M2 — SIGNIFICANT CHANGE UP
GLUCOSE SERPL-MCNC: 111 MG/DL — HIGH (ref 70–99)
HCT VFR BLD CALC: 28.2 % — LOW (ref 34.5–45)
HCT VFR BLD CALC: 31.1 % — LOW (ref 34.5–45)
HGB BLD-MCNC: 10.6 G/DL — LOW (ref 11.5–15.5)
HGB BLD-MCNC: 9.4 G/DL — LOW (ref 11.5–15.5)
MCHC RBC-ENTMCNC: 31 PG — SIGNIFICANT CHANGE UP (ref 27–34)
MCHC RBC-ENTMCNC: 31.1 PG — SIGNIFICANT CHANGE UP (ref 27–34)
MCHC RBC-ENTMCNC: 33.3 GM/DL — SIGNIFICANT CHANGE UP (ref 32–36)
MCHC RBC-ENTMCNC: 34.1 GM/DL — SIGNIFICANT CHANGE UP (ref 32–36)
MCV RBC AUTO: 91.2 FL — SIGNIFICANT CHANGE UP (ref 80–100)
MCV RBC AUTO: 93.1 FL — SIGNIFICANT CHANGE UP (ref 80–100)
PLATELET # BLD AUTO: 193 K/UL — SIGNIFICANT CHANGE UP (ref 150–400)
PLATELET # BLD AUTO: 201 K/UL — SIGNIFICANT CHANGE UP (ref 150–400)
POTASSIUM SERPL-MCNC: 3.2 MMOL/L — LOW (ref 3.5–5.3)
POTASSIUM SERPL-SCNC: 3.2 MMOL/L — LOW (ref 3.5–5.3)
RBC # BLD: 3.03 M/UL — LOW (ref 3.8–5.2)
RBC # BLD: 3.41 M/UL — LOW (ref 3.8–5.2)
RBC # FLD: 14.2 % — SIGNIFICANT CHANGE UP (ref 10.3–14.5)
RBC # FLD: 14.2 % — SIGNIFICANT CHANGE UP (ref 10.3–14.5)
SARS-COV-2 RNA SPEC QL NAA+PROBE: SIGNIFICANT CHANGE UP
SODIUM SERPL-SCNC: 137 MMOL/L — SIGNIFICANT CHANGE UP (ref 135–145)
WBC # BLD: 8.55 K/UL — SIGNIFICANT CHANGE UP (ref 3.8–10.5)
WBC # BLD: 9.85 K/UL — SIGNIFICANT CHANGE UP (ref 3.8–10.5)
WBC # FLD AUTO: 8.55 K/UL — SIGNIFICANT CHANGE UP (ref 3.8–10.5)
WBC # FLD AUTO: 9.85 K/UL — SIGNIFICANT CHANGE UP (ref 3.8–10.5)

## 2023-04-01 PROCEDURE — 93970 EXTREMITY STUDY: CPT | Mod: 26

## 2023-04-01 PROCEDURE — 99233 SBSQ HOSP IP/OBS HIGH 50: CPT

## 2023-04-01 RX ORDER — METOPROLOL TARTRATE 50 MG
25 TABLET ORAL EVERY 8 HOURS
Refills: 0 | Status: DISCONTINUED | OUTPATIENT
Start: 2023-04-01 | End: 2023-04-03

## 2023-04-01 RX ORDER — SODIUM CHLORIDE 9 MG/ML
500 INJECTION, SOLUTION INTRAVENOUS
Refills: 0 | Status: COMPLETED | OUTPATIENT
Start: 2023-04-01 | End: 2023-04-01

## 2023-04-01 RX ORDER — POTASSIUM CHLORIDE 20 MEQ
40 PACKET (EA) ORAL EVERY 4 HOURS
Refills: 0 | Status: DISCONTINUED | OUTPATIENT
Start: 2023-04-01 | End: 2023-04-01

## 2023-04-01 RX ORDER — METOPROLOL TARTRATE 50 MG
5 TABLET ORAL EVERY 6 HOURS
Refills: 0 | Status: DISCONTINUED | OUTPATIENT
Start: 2023-04-01 | End: 2023-04-03

## 2023-04-01 RX ORDER — POLYETHYLENE GLYCOL 3350 17 G/17G
17 POWDER, FOR SOLUTION ORAL DAILY
Refills: 0 | Status: DISCONTINUED | OUTPATIENT
Start: 2023-04-01 | End: 2023-04-03

## 2023-04-01 RX ORDER — METOPROLOL TARTRATE 50 MG
25 TABLET ORAL EVERY 6 HOURS
Refills: 0 | Status: DISCONTINUED | OUTPATIENT
Start: 2023-04-01 | End: 2023-04-01

## 2023-04-01 RX ORDER — SENNA PLUS 8.6 MG/1
2 TABLET ORAL AT BEDTIME
Refills: 0 | Status: DISCONTINUED | OUTPATIENT
Start: 2023-04-01 | End: 2023-04-03

## 2023-04-01 RX ORDER — POTASSIUM CHLORIDE 20 MEQ
40 PACKET (EA) ORAL EVERY 4 HOURS
Refills: 0 | Status: COMPLETED | OUTPATIENT
Start: 2023-04-01 | End: 2023-04-02

## 2023-04-01 RX ADMIN — ERGOCALCIFEROL 50000 UNIT(S): 1.25 CAPSULE ORAL at 15:05

## 2023-04-01 RX ADMIN — SODIUM CHLORIDE 500 MILLILITER(S): 9 INJECTION, SOLUTION INTRAVENOUS at 15:40

## 2023-04-01 RX ADMIN — Medication 25 MILLIGRAM(S): at 22:14

## 2023-04-01 RX ADMIN — TAMSULOSIN HYDROCHLORIDE 0.4 MILLIGRAM(S): 0.4 CAPSULE ORAL at 22:15

## 2023-04-01 RX ADMIN — CARBIDOPA AND LEVODOPA 1.5 TABLET(S): 25; 100 TABLET ORAL at 22:14

## 2023-04-01 RX ADMIN — CARBIDOPA AND LEVODOPA 1.5 TABLET(S): 25; 100 TABLET ORAL at 18:50

## 2023-04-01 RX ADMIN — Medication 162 MILLIGRAM(S): at 15:05

## 2023-04-01 RX ADMIN — CARBIDOPA AND LEVODOPA 1.5 TABLET(S): 25; 100 TABLET ORAL at 10:55

## 2023-04-01 RX ADMIN — Medication 40 MILLIGRAM(S): at 05:18

## 2023-04-01 RX ADMIN — Medication 25 MILLIGRAM(S): at 05:17

## 2023-04-01 RX ADMIN — ESCITALOPRAM OXALATE 10 MILLIGRAM(S): 10 TABLET, FILM COATED ORAL at 15:05

## 2023-04-01 RX ADMIN — POLYETHYLENE GLYCOL 3350 17 GRAM(S): 17 POWDER, FOR SOLUTION ORAL at 18:50

## 2023-04-01 RX ADMIN — ENOXAPARIN SODIUM 40 MILLIGRAM(S): 100 INJECTION SUBCUTANEOUS at 22:15

## 2023-04-01 RX ADMIN — CARBIDOPA AND LEVODOPA 1.5 TABLET(S): 25; 100 TABLET ORAL at 05:17

## 2023-04-01 RX ADMIN — CARBIDOPA AND LEVODOPA 1.5 TABLET(S): 25; 100 TABLET ORAL at 15:06

## 2023-04-01 RX ADMIN — Medication 40 MILLIEQUIVALENT(S): at 22:15

## 2023-04-01 RX ADMIN — SENNA PLUS 2 TABLET(S): 8.6 TABLET ORAL at 22:14

## 2023-04-01 RX ADMIN — Medication 5 MILLIGRAM(S): at 08:00

## 2023-04-01 NOTE — PROVIDER CONTACT NOTE (CHANGE IN STATUS NOTIFICATION) - ASSESSMENT
Patient A&O4. No symptoms of dizziness or lightheadedness. Just complaints of feeling heart racing.
pt denies c/o chest pain, sob, dizziness, palpitations, and headaches.   pt on cardiac monitor. now afib. HR 90s

## 2023-04-01 NOTE — PROVIDER CONTACT NOTE (CHANGE IN STATUS NOTIFICATION) - BACKGROUND
Patient converted to afib approximately 0730. PRN metoprolol given, patient converted back to normal sinus.
pt came in s/p fall with left hip fracture

## 2023-04-01 NOTE — PROGRESS NOTE ADULT - SUBJECTIVE AND OBJECTIVE BOX
[facility], [facility]                              [address] [place] [address]                                                 PHONE: [phone]                                                 FAX: [phone]  -----------------------------------------------------------------------------------------------  Pt seen and examined. FU for AF    Overnight events/Complaints: Pt reports shortness of breath. AF rates remain elevated. Pt reports palpitations as well.    Vital Signs Last 24 Hrs  T(C): 36.7 (01 Apr 2023 04:45), Max: 36.8 (31 Mar 2023 19:08)  T(F): 98.1 (01 Apr 2023 04:45), Max: 98.2 (31 Mar 2023 19:08)  HR: 130 (01 Apr 2023 07:51) (81 - 130)  BP: 99/63 (01 Apr 2023 07:51) (99/63 - 163/83)  BP(mean): --  RR: 19 (01 Apr 2023 04:45) (18 - 20)  SpO2: 94% (01 Apr 2023 04:45) (89% - 94%)    Parameters below as of 01 Apr 2023 04:45  Patient On (Oxygen Delivery Method): nasal cannula  O2 Flow (L/min): 3    I&O's Summary    31 Mar 2023 07:01  -  01 Apr 2023 07:00  --------------------------------------------------------  IN: 0 mL / OUT: 2750 mL / NET: -2750 mL        PHYSICAL EXAM:  Constitutional: Appears well; alert and co-operative  Eyes: Conjunctiva normal, No scleral icterus  Neck: Supple, No JVD  Cardiovascular: irregular S1, S2  Respiratory: Lungs clear to auscultation; no crepitations, no wheeze  Gastrointestinal:  Soft, Non-tender, + BS	  Musculoskeletal: No edema        LABS:                        10.6   9.85  )-----------( 201      ( 01 Apr 2023 06:16 )             31.1     03-31    141  |  104  |  16.1  ----------------------------<  94  3.5   |  28.0  |  0.49<L>    Ca    7.8<L>      31 Mar 2023 08:22  Mg     1.9     03-31      CARDIAC MARKERS ( 31 Mar 2023 20:45 )  x     / <0.01 ng/mL / x     / x     / x        RADIOLOGY & ADDITIONAL STUDIES: (reviewed)  CXR was independently visualized/reviewed  and demonstrated: clear lungs    CARDIOLOGY TESTING:(reviewed)     12 lead EKG independently visualized/reviewed  and demonstrated SR first degree, NSST changes    ECHO:  < from: TTE Echo Complete w/o Contrast w/ Doppler (03.29.23 @ 12:46) >  PHYSICIAN INTERPRETATION:  Left Ventricle: The left ventricular internal cavity size is normal. Left   ventricular wall thickness is normal.  Global LV systolic function was normal. Left ventricular ejection   fraction, by visual estimation, is 60 to 65%. Spectral Doppler shows   pseudonormal pattern of left ventricular myocardial filling (Grade II   diastolic dysfunction). Elevated mean left atrial pressure.  Right Ventricle: The right ventricular size is normal. RV systolic   function is normal.  Left Atrium: Normal left atrial size.  Right Atrium: Mildly enlarged right atrium.  Pericardium: There is no evidence of pericardial effusion.  Mitral Valve: A bioprosthetic valve is present in the mitral position. No   evidence of mitral valve regurgitation is seen. Bio MVR seated well with   normal gradients. No detectable MR.  Tricuspid Valve: The tricuspid valve is normal in structure. Moderate   tricuspid regurgitation is visualized. Estimated pulmonary artery   systolic pressure is 55.9 mmHg assuming a right atrial pressure of 8   mmHg, which is consistent with moderate pulmonary hypertension.  Aortic Valve: The aortic valve is trileaflet. Sclerotic aortic valve with   normal opening. Trivial aortic valve regurgitation is seen.  Pulmonic Valve: The pulmonic valve is normal. Trace pulmonic valve   regurgitation.  Aorta: The aortic root and ascending aorta are structurally normal, with   no evidence of dilitation.  Pulmonary Artery: The pulmonary artery is mildly dilated.  Venous: The inferior vena cava was normal sized, with respiratory size   variation less than 50%.      Summary:   1. Left ventricular ejection fraction, by visual estimation, is 60 to   65%.   2. Normal global left ventricular systolic function.   3. Elevated mean left atrial pressure.   4. Spectral Doppler shows pseudonormal pattern of left ventricular   myocardial filling (Grade II diastolic dysfunction).   5. Mildly enlarged right atrium.   6. Normal left atrial size.   7. A bioprosthetic valve is present in the mitral position.   8. Bio MVR seated well with normal gradients. No detectable MR.   9. No evidence of mitral valveregurgitation.  10. Moderate tricuspid regurgitation.  11. Estimated pulmonary artery systolic pressure is 55.9 mmHg assuming a   right atrial pressure of 8 mmHg, which is consistent with moderate   pulmonary hypertension.  12. Mildly dilated pulmonary artery.  13. Sclerotic aortic valve with normal opening.    TELEMETRY independently visualized/reviewed and demonstrated : AF with RVR    MEDICATIONS:(reviewed)  MEDICATIONS  (STANDING):  aspirin enteric coated 162 milliGRAM(s) Oral daily  carbidopa/levodopa  25/100 1.5 Tablet(s) Oral <User Schedule>  chlorhexidine 2% Cloths 1 Application(s) Topical every 12 hours  enoxaparin Injectable 40 milliGRAM(s) SubCutaneous every 24 hours  ergocalciferol 14341 Unit(s) Oral every week  escitalopram 10 milliGRAM(s) Oral daily  furosemide   Injectable 40 milliGRAM(s) IV Push daily  metoprolol succinate ER 25 milliGRAM(s) Oral daily  tamsulosin 0.4 milliGRAM(s) Oral at bedtime      ASSESSMENT AND PLAN:     88y Female with history of Parkinson's, paroxysmal atrial fibrillation refused anticoagulation, bio MVR who presents with left hip fracture after fall s/p IM nailing, hospital course c/b paroxysmal atrial fibrillation with controlled VR, intraop hypotension requiring pressor and fluid boluses, anemia likely from acute surgical blood loss, and new onset shortness of breath.     AF with RVR  - continue toprol  - Add cardizem 30 mg q6  - Has declines anticoagulation in the past  - Eventual outpatient Watchman evaluation    Shortness of breath  - likely related to anemia and AF with RVR  - No gross volume overload  - Keep on lasix today and will reassess                                                    [facility], [facility]                              [address] [place] [address]                                                 PHONE: [phone]                                                 FAX: [phone]  -----------------------------------------------------------------------------------------------  Pt seen and examined. FU for AF    Overnight events/Complaints: Pt reports shortness of breath. AF rates remain elevated. Pt reports palpitations as well.    Vital Signs Last 24 Hrs  T(C): 36.7 (01 Apr 2023 04:45), Max: 36.8 (31 Mar 2023 19:08)  T(F): 98.1 (01 Apr 2023 04:45), Max: 98.2 (31 Mar 2023 19:08)  HR: 130 (01 Apr 2023 07:51) (81 - 130)  BP: 99/63 (01 Apr 2023 07:51) (99/63 - 163/83)  BP(mean): --  RR: 19 (01 Apr 2023 04:45) (18 - 20)  SpO2: 94% (01 Apr 2023 04:45) (89% - 94%)    Parameters below as of 01 Apr 2023 04:45  Patient On (Oxygen Delivery Method): nasal cannula  O2 Flow (L/min): 3    I&O's Summary    31 Mar 2023 07:01  -  01 Apr 2023 07:00  --------------------------------------------------------  IN: 0 mL / OUT: 2750 mL / NET: -2750 mL        PHYSICAL EXAM:  Constitutional: Appears well; alert and co-operative  Eyes: Conjunctiva normal, No scleral icterus  Neck: Supple, No JVD  Cardiovascular: irregular S1, S2  Respiratory: Lungs clear to auscultation; no crepitations, no wheeze  Gastrointestinal:  Soft, Non-tender, + BS	  Musculoskeletal: No edema        LABS:                        10.6   9.85  )-----------( 201      ( 01 Apr 2023 06:16 )             31.1     03-31    141  |  104  |  16.1  ----------------------------<  94  3.5   |  28.0  |  0.49<L>    Ca    7.8<L>      31 Mar 2023 08:22  Mg     1.9     03-31      CARDIAC MARKERS ( 31 Mar 2023 20:45 )  x     / <0.01 ng/mL / x     / x     / x        RADIOLOGY & ADDITIONAL STUDIES: (reviewed)  CXR was independently visualized/reviewed  and demonstrated: clear lungs    CARDIOLOGY TESTING:(reviewed)     12 lead EKG independently visualized/reviewed  and demonstrated SR first degree, NSST changes    ECHO:  < from: TTE Echo Complete w/o Contrast w/ Doppler (03.29.23 @ 12:46) >  PHYSICIAN INTERPRETATION:  Left Ventricle: The left ventricular internal cavity size is normal. Left   ventricular wall thickness is normal.  Global LV systolic function was normal. Left ventricular ejection   fraction, by visual estimation, is 60 to 65%. Spectral Doppler shows   pseudonormal pattern of left ventricular myocardial filling (Grade II   diastolic dysfunction). Elevated mean left atrial pressure.  Right Ventricle: The right ventricular size is normal. RV systolic   function is normal.  Left Atrium: Normal left atrial size.  Right Atrium: Mildly enlarged right atrium.  Pericardium: There is no evidence of pericardial effusion.  Mitral Valve: A bioprosthetic valve is present in the mitral position. No   evidence of mitral valve regurgitation is seen. Bio MVR seated well with   normal gradients. No detectable MR.  Tricuspid Valve: The tricuspid valve is normal in structure. Moderate   tricuspid regurgitation is visualized. Estimated pulmonary artery   systolic pressure is 55.9 mmHg assuming a right atrial pressure of 8   mmHg, which is consistent with moderate pulmonary hypertension.  Aortic Valve: The aortic valve is trileaflet. Sclerotic aortic valve with   normal opening. Trivial aortic valve regurgitation is seen.  Pulmonic Valve: The pulmonic valve is normal. Trace pulmonic valve   regurgitation.  Aorta: The aortic root and ascending aorta are structurally normal, with   no evidence of dilitation.  Pulmonary Artery: The pulmonary artery is mildly dilated.  Venous: The inferior vena cava was normal sized, with respiratory size   variation less than 50%.      Summary:   1. Left ventricular ejection fraction, by visual estimation, is 60 to   65%.   2. Normal global left ventricular systolic function.   3. Elevated mean left atrial pressure.   4. Spectral Doppler shows pseudonormal pattern of left ventricular   myocardial filling (Grade II diastolic dysfunction).   5. Mildly enlarged right atrium.   6. Normal left atrial size.   7. A bioprosthetic valve is present in the mitral position.   8. Bio MVR seated well with normal gradients. No detectable MR.   9. No evidence of mitral valveregurgitation.  10. Moderate tricuspid regurgitation.  11. Estimated pulmonary artery systolic pressure is 55.9 mmHg assuming a   right atrial pressure of 8 mmHg, which is consistent with moderate   pulmonary hypertension.  12. Mildly dilated pulmonary artery.  13. Sclerotic aortic valve with normal opening.    TELEMETRY independently visualized/reviewed and demonstrated : AF with RVR    MEDICATIONS:(reviewed)  MEDICATIONS  (STANDING):  aspirin enteric coated 162 milliGRAM(s) Oral daily  carbidopa/levodopa  25/100 1.5 Tablet(s) Oral <User Schedule>  chlorhexidine 2% Cloths 1 Application(s) Topical every 12 hours  enoxaparin Injectable 40 milliGRAM(s) SubCutaneous every 24 hours  ergocalciferol 18033 Unit(s) Oral every week  escitalopram 10 milliGRAM(s) Oral daily  furosemide   Injectable 40 milliGRAM(s) IV Push daily  metoprolol succinate ER 25 milliGRAM(s) Oral daily  tamsulosin 0.4 milliGRAM(s) Oral at bedtime      ASSESSMENT AND PLAN:     88y Female with history of Parkinson's, paroxysmal atrial fibrillation refused anticoagulation, bio MVR who presents with left hip fracture after fall s/p IM nailing, hospital course c/b paroxysmal atrial fibrillation with controlled VR, intraop hypotension requiring pressor and fluid boluses, anemia likely from acute surgical blood loss, and new onset shortness of breath.     AF with RVR  - increase metoprolol to 25 mg q6. Reportedly has allergy to cardizem  - Has declined anticoagulation in the past  - Eventual outpatient Watchman evaluation    Shortness of breath  - likely related to anemia and AF with RVR  - No gross volume overload  - Keep on lasix today and will reassess

## 2023-04-01 NOTE — PROGRESS NOTE ADULT - ASSESSMENT
89 y/o female pt. presenting to the emergency department with a chief complaint of left hip fx mgmt       1- Hypotension  during OR   Stable  cbc normalized  trend cbc    2- Anemia of blood loss   stable  trend cbc, tx as needed  noted AOCD on iron studies    3- Left hip fracture,  comminuted left intertrochanteric hip fracture  s/p IM nail on 3/28   cont pain meds as needed   PT daily eventual BLESSING likely later today or tomorrow    4-h./o Atrial fib  RVR today, resolved w/ Lopressor push   Increase BB to Lopressor 25mg Q6 as per [facility]  not on AC   c/w asa  TTE result reviewed   mode Phtn , EF normal     5- Parkinosn dx   on sinemet     6- Constipation  start senna and miralax    7-Healthcare maintenance  DVT prophylaxis lovenox   Dispo - BLESSING once medically optimized, likely today or tomorrow

## 2023-04-01 NOTE — PROGRESS NOTE ADULT - SUBJECTIVE AND OBJECTIVE BOX
[facility] Division of Hospital Medicine    Chief Complaint: Lt Hip Fx    SUBJECTIVE / OVERNIGHT EVENTS:  No events overnight, patient seen at bedside, in NAD, complaining of constipation. Noted to be in RVR at bedside w/ mild palpitations. Patient denies chest pain, SOB, abd pain, N/V, fever, chills, dysuria or any other complaints. All remainder ROS negative.     MEDICATIONS  (STANDING):  aspirin enteric coated 162 milliGRAM(s) Oral daily  carbidopa/levodopa  25/100 1.5 Tablet(s) Oral <User Schedule>  chlorhexidine 2% Cloths 1 Application(s) Topical every 12 hours  enoxaparin Injectable 40 milliGRAM(s) SubCutaneous every 24 hours  ergocalciferol 00561 Unit(s) Oral every week  escitalopram 10 milliGRAM(s) Oral daily  furosemide   Injectable 40 milliGRAM(s) IV Push daily  metoprolol tartrate 25 milliGRAM(s) Oral every 6 hours  senna 2 Tablet(s) Oral at bedtime  tamsulosin 0.4 milliGRAM(s) Oral at bedtime    MEDICATIONS  (PRN):  acetaminophen     Tablet .. 650 milliGRAM(s) Oral every 6 hours PRN Mild Pain (1 - 3)  aluminum hydroxide/magnesium hydroxide/simethicone Suspension 30 milliLiter(s) Oral four times a day PRN Indigestion  bisacodyl Suppository 10 milliGRAM(s) Rectal daily PRN If no bowel movement  magnesium hydroxide Suspension 30 milliLiter(s) Oral daily PRN Constipation  metoprolol tartrate Injectable 5 milliGRAM(s) IV Push every 6 hours PRN HR >120  ondansetron Injectable 4 milliGRAM(s) IV Push every 6 hours PRN Nausea and/or Vomiting  oxyCODONE    IR 5 milliGRAM(s) Oral every 4 hours PRN Severe Pain (7 - 10)  oxyCODONE    IR 2.5 milliGRAM(s) Oral every 4 hours PRN Moderate Pain (4 - 6)  polyethylene glycol 3350 17 Gram(s) Oral daily PRN Constipation        I&O's Summary    31 Mar 2023 07:01  -  01 Apr 2023 07:00  --------------------------------------------------------  IN: 0 mL / OUT: 2750 mL / NET: -2750 mL        PHYSICAL EXAM:  Vital Signs Last 24 Hrs  T(C): 36.7 (01 Apr 2023 04:45), Max: 36.8 (31 Mar 2023 19:08)  T(F): 98.1 (01 Apr 2023 04:45), Max: 98.2 (31 Mar 2023 19:08)  HR: 130 (01 Apr 2023 07:51) (81 - 130)  BP: 99/63 (01 Apr 2023 07:51) (99/63 - 163/83)  BP(mean): --  RR: 19 (01 Apr 2023 04:45) (18 - 20)  SpO2: 94% (01 Apr 2023 04:45) (89% - 94%)    Parameters below as of 01 Apr 2023 04:45  Patient On (Oxygen Delivery Method): nasal cannula  O2 Flow (L/min): 3        General: awake alert , pale color   ENT: mouth normal mucosa   Neck: supple , no JVD   Lungs: CTA bilateral   Cardio: irregular rate and rhythm, S1/S2, No murmur  Abdomen: Soft, Nontender, Nondistended; Bowel sounds present  Extremities: No calf tenderness, No pitting edema  Musculoskletal : lft thigh hip dressing in place , swollen left thigh around surgery site   + skin ecchymoses    LABS:                        10.6   9.85  )-----------( 201      ( 01 Apr 2023 06:16 )             31.1     03-31    141  |  104  |  16.1  ----------------------------<  94  3.5   |  28.0  |  0.49<L>    Ca    7.8<L>      31 Mar 2023 08:22  Mg     1.9     03-31        CARDIAC MARKERS ( 31 Mar 2023 20:45 )  x     / <0.01 ng/mL / x     / x     / x              CAPILLARY BLOOD GLUCOSE            RADIOLOGY & ADDITIONAL TESTS:  Results Reviewed: Y  Imaging Personally Reviewed: N  Electrocardiogram Personally Reviewed: ANTHONY

## 2023-04-01 NOTE — PROVIDER CONTACT NOTE (CHANGE IN STATUS NOTIFICATION) - SITUATION
pt on cardiac monitor. converted to afib
Patient converted back to afib, up to 140s HR. BP 84/40 manually.

## 2023-04-02 LAB
ANION GAP SERPL CALC-SCNC: 8 MMOL/L — SIGNIFICANT CHANGE UP (ref 5–17)
BUN SERPL-MCNC: 18.2 MG/DL — SIGNIFICANT CHANGE UP (ref 8–20)
CALCIUM SERPL-MCNC: 7.6 MG/DL — LOW (ref 8.4–10.5)
CHLORIDE SERPL-SCNC: 104 MMOL/L — SIGNIFICANT CHANGE UP (ref 96–108)
CO2 SERPL-SCNC: 27 MMOL/L — SIGNIFICANT CHANGE UP (ref 22–29)
CREAT SERPL-MCNC: 0.46 MG/DL — LOW (ref 0.5–1.3)
EGFR: 93 ML/MIN/1.73M2 — SIGNIFICANT CHANGE UP
GLUCOSE SERPL-MCNC: 104 MG/DL — HIGH (ref 70–99)
HCT VFR BLD CALC: 26.1 % — LOW (ref 34.5–45)
HGB BLD-MCNC: 8.7 G/DL — LOW (ref 11.5–15.5)
MCHC RBC-ENTMCNC: 31.1 PG — SIGNIFICANT CHANGE UP (ref 27–34)
MCHC RBC-ENTMCNC: 33.3 GM/DL — SIGNIFICANT CHANGE UP (ref 32–36)
MCV RBC AUTO: 93.2 FL — SIGNIFICANT CHANGE UP (ref 80–100)
PLATELET # BLD AUTO: 212 K/UL — SIGNIFICANT CHANGE UP (ref 150–400)
POTASSIUM SERPL-MCNC: 4.6 MMOL/L — SIGNIFICANT CHANGE UP (ref 3.5–5.3)
POTASSIUM SERPL-SCNC: 4.6 MMOL/L — SIGNIFICANT CHANGE UP (ref 3.5–5.3)
RBC # BLD: 2.8 M/UL — LOW (ref 3.8–5.2)
RBC # FLD: 13.9 % — SIGNIFICANT CHANGE UP (ref 10.3–14.5)
SODIUM SERPL-SCNC: 139 MMOL/L — SIGNIFICANT CHANGE UP (ref 135–145)
WBC # BLD: 8.21 K/UL — SIGNIFICANT CHANGE UP (ref 3.8–10.5)
WBC # FLD AUTO: 8.21 K/UL — SIGNIFICANT CHANGE UP (ref 3.8–10.5)

## 2023-04-02 PROCEDURE — 71045 X-RAY EXAM CHEST 1 VIEW: CPT | Mod: 26

## 2023-04-02 PROCEDURE — 99233 SBSQ HOSP IP/OBS HIGH 50: CPT

## 2023-04-02 RX ORDER — KETOROLAC TROMETHAMINE 30 MG/ML
15 SYRINGE (ML) INJECTION ONCE
Refills: 0 | Status: DISCONTINUED | OUTPATIENT
Start: 2023-04-02 | End: 2023-04-02

## 2023-04-02 RX ADMIN — Medication 162 MILLIGRAM(S): at 14:51

## 2023-04-02 RX ADMIN — CARBIDOPA AND LEVODOPA 1.5 TABLET(S): 25; 100 TABLET ORAL at 14:52

## 2023-04-02 RX ADMIN — CARBIDOPA AND LEVODOPA 1.5 TABLET(S): 25; 100 TABLET ORAL at 11:12

## 2023-04-02 RX ADMIN — Medication 15 MILLIGRAM(S): at 14:51

## 2023-04-02 RX ADMIN — CARBIDOPA AND LEVODOPA 1.5 TABLET(S): 25; 100 TABLET ORAL at 20:58

## 2023-04-02 RX ADMIN — Medication 25 MILLIGRAM(S): at 05:22

## 2023-04-02 RX ADMIN — Medication 25 MILLIGRAM(S): at 20:58

## 2023-04-02 RX ADMIN — Medication 650 MILLIGRAM(S): at 05:22

## 2023-04-02 RX ADMIN — CARBIDOPA AND LEVODOPA 1.5 TABLET(S): 25; 100 TABLET ORAL at 18:59

## 2023-04-02 RX ADMIN — Medication 40 MILLIEQUIVALENT(S): at 02:22

## 2023-04-02 RX ADMIN — SENNA PLUS 2 TABLET(S): 8.6 TABLET ORAL at 20:58

## 2023-04-02 RX ADMIN — ESCITALOPRAM OXALATE 10 MILLIGRAM(S): 10 TABLET, FILM COATED ORAL at 14:52

## 2023-04-02 RX ADMIN — TAMSULOSIN HYDROCHLORIDE 0.4 MILLIGRAM(S): 0.4 CAPSULE ORAL at 20:58

## 2023-04-02 RX ADMIN — CARBIDOPA AND LEVODOPA 1.5 TABLET(S): 25; 100 TABLET ORAL at 05:20

## 2023-04-02 RX ADMIN — ENOXAPARIN SODIUM 40 MILLIGRAM(S): 100 INJECTION SUBCUTANEOUS at 20:57

## 2023-04-02 NOTE — PROGRESS NOTE ADULT - ASSESSMENT
89 y/o female pt. presenting to the emergency department with a chief complaint of left hip fx mgmt       1- Hypotension  during OR   Stablized s/p IV Fluid  cbc normalized      2- Anemia of blood loss   stable  trend cbc, tx as needed  noted AOCD on iron studies    3- Left hip fracture,  comminuted left intertrochanteric hip fracture  s/p IM nail on 3/28   cont pain meds as needed   PT daily eventual BLESSING likely tomorrow pending auth    4-h./o Atrial fib  RVR Resolved  c/w Lopressor 25mg Q8  not on AC   c/w asa  TTE result reviewed, as per discussion w/ attending, does not need new limited echo  mode Phtn , EF normal     5- Parkinosn dx   on sinemet     6- Constipation  start senna and miralax    7-Healthcare maintenance  DVT prophylaxis lovenox   Dispo - BLESSING pending auth, likely tomorrow

## 2023-04-02 NOTE — PROGRESS NOTE ADULT - SUBJECTIVE AND OBJECTIVE BOX
[facility], [facility]                              [address] [place] [address]                                                 PHONE: [phone]                                                 FAX: [phone]  -----------------------------------------------------------------------------------------------  Pt seen and examined. FU for AF    Overnight events/Complaints: Pt comfortable in bed without orthopnea. Back in NSR    Vital Signs Last 24 Hrs  T(C): 36.4 (02 Apr 2023 05:12), Max: 37 (01 Apr 2023 17:29)  T(F): 97.6 (02 Apr 2023 05:12), Max: 98.6 (01 Apr 2023 17:29)  HR: 74 (02 Apr 2023 05:12) (74 - 122)  BP: 97/60 (02 Apr 2023 05:12) (84/40 - 111/65)  BP(mean): --  RR: 16 (02 Apr 2023 05:12) (16 - 18)  SpO2: 93% (02 Apr 2023 05:12) (85% - 98%)    Parameters below as of 02 Apr 2023 05:12  Patient On (Oxygen Delivery Method): nasal cannula  O2 Flow (L/min): 2    I&O's Summary    PHYSICAL EXAM:  Constitutional: Appears well; alert and co-operative  Eyes: Conjunctiva normal, No scleral icterus  Neck: Supple, No JVD  Cardiovascular: regular S1, S2  Respiratory: Lungs clear to auscultation; no crepitations, no wheeze  Gastrointestinal:  Soft, Non-tender, + BS	  Musculoskeletal: No edema        LABS:                        8.7    8.21  )-----------( 212      ( 02 Apr 2023 06:58 )             26.1     04-02    139  |  104  |  18.2  ----------------------------<  104<H>  4.6   |  27.0  |  0.46<L>    Ca    7.6<L>      02 Apr 2023 06:58      CARDIAC MARKERS ( 31 Mar 2023 20:45 )  x     / <0.01 ng/mL / x     / x     / x        RADIOLOGY & ADDITIONAL STUDIES: (reviewed)  CXR was independently visualized/reviewed  and demonstrated: clear lungs    CARDIOLOGY TESTING:(reviewed)     12 lead EKG independently visualized/reviewed  and demonstrated SR first degree, NSST changes    ECHO:  < from: TTE Echo Complete w/o Contrast w/ Doppler (03.29.23 @ 12:46) >  PHYSICIAN INTERPRETATION:  Left Ventricle: The left ventricular internal cavity size is normal. Left   ventricular wall thickness is normal.  Global LV systolic function was normal. Left ventricular ejection   fraction, by visual estimation, is 60 to 65%. Spectral Doppler shows   pseudonormal pattern of left ventricular myocardial filling (Grade II   diastolic dysfunction). Elevated mean left atrial pressure.  Right Ventricle: The right ventricular size is normal. RV systolic   function is normal.  Left Atrium: Normal left atrial size.  Right Atrium: Mildly enlarged right atrium.  Pericardium: There is no evidence of pericardial effusion.  Mitral Valve: A bioprosthetic valve is present in the mitral position. No   evidence of mitral valve regurgitation is seen. Bio MVR seated well with   normal gradients. No detectable MR.  Tricuspid Valve: The tricuspid valve is normal in structure. Moderate   tricuspid regurgitation is visualized. Estimated pulmonary artery   systolic pressure is 55.9 mmHg assuming a right atrial pressure of 8   mmHg, which is consistent with moderate pulmonary hypertension.  Aortic Valve: The aortic valve is trileaflet. Sclerotic aortic valve with   normal opening. Trivial aortic valve regurgitation is seen.  Pulmonic Valve: The pulmonic valve is normal. Trace pulmonic valve   regurgitation.  Aorta: The aortic root and ascending aorta are structurally normal, with   no evidence of dilitation.  Pulmonary Artery: The pulmonary artery is mildly dilated.  Venous: The inferior vena cava was normal sized, with respiratory size   variation less than 50%.      Summary:   1. Left ventricular ejection fraction, by visual estimation, is 60 to   65%.   2. Normal global left ventricular systolic function.   3. Elevated mean left atrial pressure.   4. Spectral Doppler shows pseudonormal pattern of left ventricular   myocardial filling (Grade II diastolic dysfunction).   5. Mildly enlarged right atrium.   6. Normal left atrial size.   7. A bioprosthetic valve is present in the mitral position.   8. Bio MVR seated well with normal gradients. No detectable MR.   9. No evidence of mitral valveregurgitation.  10. Moderate tricuspid regurgitation.  11. Estimated pulmonary artery systolic pressure is 55.9 mmHg assuming a   right atrial pressure of 8 mmHg, which is consistent with moderate   pulmonary hypertension.  12. Mildly dilated pulmonary artery.  13. Sclerotic aortic valve with normal opening.    TELEMETRY independently visualized/reviewed and demonstrated : PAF    MEDICATIONS:(reviewed)  MEDICATIONS  (STANDING):  aspirin enteric coated 162 milliGRAM(s) Oral daily  carbidopa/levodopa  25/100 1.5 Tablet(s) Oral <User Schedule>  chlorhexidine 2% Cloths 1 Application(s) Topical every 12 hours  enoxaparin Injectable 40 milliGRAM(s) SubCutaneous every 24 hours  ergocalciferol 97085 Unit(s) Oral every week  escitalopram 10 milliGRAM(s) Oral daily  metoprolol tartrate 25 milliGRAM(s) Oral every 8 hours  senna 2 Tablet(s) Oral at bedtime  tamsulosin 0.4 milliGRAM(s) Oral at bedtime    ASSESSMENT AND PLAN:    88y Female with history of Parkinson's, paroxysmal atrial fibrillation refused anticoagulation, bio MVR who presents with left hip fracture after fall s/p IM nailing, hospital course c/b paroxysmal atrial fibrillation with controlled VR, intraop hypotension requiring pressor and fluid boluses, anemia likely from acute surgical blood loss, and new onset shortness of breath.     AF with RVR  - continue metoprolol at current dose  - Has declined anticoagulation in the past  - Eventual outpatient Watchman evaluation    Shortness of breath  - likely related to anemia and AF with RVR  - No gross volume overload    Additional history obtained and plan discussed with family at bedside    Plan d/w Dr. Leal

## 2023-04-02 NOTE — PROGRESS NOTE ADULT - SUBJECTIVE AND OBJECTIVE BOX
[facility] Division of Hospital Medicine    Chief Complaint: Lt Hip Fx    SUBJECTIVE / OVERNIGHT EVENTS:  Yesterday, patient mildly hypotensive and in breakthrough RVR. Titrated beta blockers, gave 500cc bolus of fluid once w/ improvement overall Patient seen at bedside today, notes improvement in constipation. Patient denies chest pain, SOB, abd pain, N/V, fever, chills, dysuria or any other complaints. All remainder ROS negative.     MEDICATIONS  (STANDING):  aspirin enteric coated 162 milliGRAM(s) Oral daily  carbidopa/levodopa  25/100 1.5 Tablet(s) Oral <User Schedule>  chlorhexidine 2% Cloths 1 Application(s) Topical every 12 hours  enoxaparin Injectable 40 milliGRAM(s) SubCutaneous every 24 hours  ergocalciferol 08952 Unit(s) Oral every week  escitalopram 10 milliGRAM(s) Oral daily  metoprolol tartrate 25 milliGRAM(s) Oral every 8 hours  senna 2 Tablet(s) Oral at bedtime  tamsulosin 0.4 milliGRAM(s) Oral at bedtime    MEDICATIONS  (PRN):  acetaminophen     Tablet .. 650 milliGRAM(s) Oral every 6 hours PRN Mild Pain (1 - 3)  aluminum hydroxide/magnesium hydroxide/simethicone Suspension 30 milliLiter(s) Oral four times a day PRN Indigestion  bisacodyl Suppository 10 milliGRAM(s) Rectal daily PRN If no bowel movement  magnesium hydroxide Suspension 30 milliLiter(s) Oral daily PRN Constipation  metoprolol tartrate Injectable 5 milliGRAM(s) IV Push every 6 hours PRN HR >120  ondansetron Injectable 4 milliGRAM(s) IV Push every 6 hours PRN Nausea and/or Vomiting  oxyCODONE    IR 5 milliGRAM(s) Oral every 4 hours PRN Severe Pain (7 - 10)  oxyCODONE    IR 2.5 milliGRAM(s) Oral every 4 hours PRN Moderate Pain (4 - 6)  polyethylene glycol 3350 17 Gram(s) Oral daily PRN Constipation        I&O's Summary      PHYSICAL EXAM:  Vital Signs Last 24 Hrs  T(C): 36.4 (02 Apr 2023 05:12), Max: 37 (01 Apr 2023 17:29)  T(F): 97.6 (02 Apr 2023 05:12), Max: 98.6 (01 Apr 2023 17:29)  HR: 74 (02 Apr 2023 05:12) (74 - 122)  BP: 97/60 (02 Apr 2023 05:12) (84/40 - 111/65)  BP(mean): --  RR: 16 (02 Apr 2023 05:12) (16 - 18)  SpO2: 93% (02 Apr 2023 05:12) (85% - 98%)    Parameters below as of 02 Apr 2023 05:12  Patient On (Oxygen Delivery Method): nasal cannula  O2 Flow (L/min): 2        General: awake alert , pale color   ENT: mouth normal mucosa   Neck: supple , no JVD   Lungs: CTA bilateral   Cardio: irregular rate and rhythm, S1/S2, No murmur  Abdomen: Soft, Nontender, Nondistended; Bowel sounds present  Extremities: No calf tenderness, No pitting edema  Musculoskletal : lft thigh hip dressing in place , swollen left thigh around surgery site   + skin ecchymoses    LABS:                        8.7    8.21  )-----------( 212      ( 02 Apr 2023 06:58 )             26.1     04-02    139  |  104  |  18.2  ----------------------------<  104<H>  4.6   |  27.0  |  0.46<L>    Ca    7.6<L>      02 Apr 2023 06:58        CARDIAC MARKERS ( 31 Mar 2023 20:45 )  x     / <0.01 ng/mL / x     / x     / x              CAPILLARY BLOOD GLUCOSE            RADIOLOGY & ADDITIONAL TESTS:  Results Reviewed: Y  Imaging Personally Reviewed: N  Electrocardiogram Personally Reviewed: ANTHONY

## 2023-04-03 ENCOUNTER — TRANSCRIPTION ENCOUNTER (OUTPATIENT)
Age: 88
End: 2023-04-03

## 2023-04-03 VITALS
RESPIRATION RATE: 17 BRPM | DIASTOLIC BLOOD PRESSURE: 62 MMHG | HEART RATE: 102 BPM | SYSTOLIC BLOOD PRESSURE: 97 MMHG | TEMPERATURE: 98 F | OXYGEN SATURATION: 96 %

## 2023-04-03 LAB
ANION GAP SERPL CALC-SCNC: 8 MMOL/L — SIGNIFICANT CHANGE UP (ref 5–17)
BUN SERPL-MCNC: 23.6 MG/DL — HIGH (ref 8–20)
CALCIUM SERPL-MCNC: 7.6 MG/DL — LOW (ref 8.4–10.5)
CHLORIDE SERPL-SCNC: 101 MMOL/L — SIGNIFICANT CHANGE UP (ref 96–108)
CO2 SERPL-SCNC: 27 MMOL/L — SIGNIFICANT CHANGE UP (ref 22–29)
CREAT SERPL-MCNC: 0.53 MG/DL — SIGNIFICANT CHANGE UP (ref 0.5–1.3)
EGFR: 90 ML/MIN/1.73M2 — SIGNIFICANT CHANGE UP
GLUCOSE SERPL-MCNC: 92 MG/DL — SIGNIFICANT CHANGE UP (ref 70–99)
HCT VFR BLD CALC: 25.2 % — LOW (ref 34.5–45)
HGB BLD-MCNC: 8.2 G/DL — LOW (ref 11.5–15.5)
MAGNESIUM SERPL-MCNC: 1.9 MG/DL — SIGNIFICANT CHANGE UP (ref 1.6–2.6)
MCHC RBC-ENTMCNC: 31.1 PG — SIGNIFICANT CHANGE UP (ref 27–34)
MCHC RBC-ENTMCNC: 32.5 GM/DL — SIGNIFICANT CHANGE UP (ref 32–36)
MCV RBC AUTO: 95.5 FL — SIGNIFICANT CHANGE UP (ref 80–100)
PHOSPHATE SERPL-MCNC: 3.1 MG/DL — SIGNIFICANT CHANGE UP (ref 2.4–4.7)
PLATELET # BLD AUTO: 239 K/UL — SIGNIFICANT CHANGE UP (ref 150–400)
POTASSIUM SERPL-MCNC: 4 MMOL/L — SIGNIFICANT CHANGE UP (ref 3.5–5.3)
POTASSIUM SERPL-SCNC: 4 MMOL/L — SIGNIFICANT CHANGE UP (ref 3.5–5.3)
RBC # BLD: 2.64 M/UL — LOW (ref 3.8–5.2)
RBC # FLD: 13.8 % — SIGNIFICANT CHANGE UP (ref 10.3–14.5)
SODIUM SERPL-SCNC: 136 MMOL/L — SIGNIFICANT CHANGE UP (ref 135–145)
WBC # BLD: 8.06 K/UL — SIGNIFICANT CHANGE UP (ref 3.8–10.5)
WBC # FLD AUTO: 8.06 K/UL — SIGNIFICANT CHANGE UP (ref 3.8–10.5)

## 2023-04-03 PROCEDURE — 97167 OT EVAL HIGH COMPLEX 60 MIN: CPT

## 2023-04-03 PROCEDURE — 87640 STAPH A DNA AMP PROBE: CPT

## 2023-04-03 PROCEDURE — 85027 COMPLETE CBC AUTOMATED: CPT

## 2023-04-03 PROCEDURE — 76000 FLUOROSCOPY <1 HR PHYS/QHP: CPT

## 2023-04-03 PROCEDURE — 80053 COMPREHEN METABOLIC PANEL: CPT

## 2023-04-03 PROCEDURE — 84466 ASSAY OF TRANSFERRIN: CPT

## 2023-04-03 PROCEDURE — U0003: CPT

## 2023-04-03 PROCEDURE — 83550 IRON BINDING TEST: CPT

## 2023-04-03 PROCEDURE — 85610 PROTHROMBIN TIME: CPT

## 2023-04-03 PROCEDURE — 87641 MR-STAPH DNA AMP PROBE: CPT

## 2023-04-03 PROCEDURE — 36430 TRANSFUSION BLD/BLD COMPNT: CPT

## 2023-04-03 PROCEDURE — 82306 VITAMIN D 25 HYDROXY: CPT

## 2023-04-03 PROCEDURE — 36415 COLL VENOUS BLD VENIPUNCTURE: CPT

## 2023-04-03 PROCEDURE — 93005 ELECTROCARDIOGRAM TRACING: CPT

## 2023-04-03 PROCEDURE — 99239 HOSP IP/OBS DSCHRG MGMT >30: CPT

## 2023-04-03 PROCEDURE — 96375 TX/PRO/DX INJ NEW DRUG ADDON: CPT

## 2023-04-03 PROCEDURE — 84100 ASSAY OF PHOSPHORUS: CPT

## 2023-04-03 PROCEDURE — P9016: CPT

## 2023-04-03 PROCEDURE — 93970 EXTREMITY STUDY: CPT

## 2023-04-03 PROCEDURE — 93306 TTE W/DOPPLER COMPLETE: CPT

## 2023-04-03 PROCEDURE — 85025 COMPLETE CBC W/AUTO DIFF WBC: CPT

## 2023-04-03 PROCEDURE — 84484 ASSAY OF TROPONIN QUANT: CPT

## 2023-04-03 PROCEDURE — 73552 X-RAY EXAM OF FEMUR 2/>: CPT

## 2023-04-03 PROCEDURE — 96374 THER/PROPH/DIAG INJ IV PUSH: CPT

## 2023-04-03 PROCEDURE — 73502 X-RAY EXAM HIP UNI 2-3 VIEWS: CPT

## 2023-04-03 PROCEDURE — 71275 CT ANGIOGRAPHY CHEST: CPT

## 2023-04-03 PROCEDURE — 86901 BLOOD TYPING SEROLOGIC RH(D): CPT

## 2023-04-03 PROCEDURE — 84443 ASSAY THYROID STIM HORMONE: CPT

## 2023-04-03 PROCEDURE — 82607 VITAMIN B-12: CPT

## 2023-04-03 PROCEDURE — 86923 COMPATIBILITY TEST ELECTRIC: CPT

## 2023-04-03 PROCEDURE — 83735 ASSAY OF MAGNESIUM: CPT

## 2023-04-03 PROCEDURE — U0005: CPT

## 2023-04-03 PROCEDURE — 80048 BASIC METABOLIC PNL TOTAL CA: CPT

## 2023-04-03 PROCEDURE — 72170 X-RAY EXAM OF PELVIS: CPT

## 2023-04-03 PROCEDURE — 83880 ASSAY OF NATRIURETIC PEPTIDE: CPT

## 2023-04-03 PROCEDURE — 85730 THROMBOPLASTIN TIME PARTIAL: CPT

## 2023-04-03 PROCEDURE — 86900 BLOOD TYPING SEROLOGIC ABO: CPT

## 2023-04-03 PROCEDURE — 99285 EMERGENCY DEPT VISIT HI MDM: CPT | Mod: 25

## 2023-04-03 PROCEDURE — 86850 RBC ANTIBODY SCREEN: CPT

## 2023-04-03 PROCEDURE — C1769: CPT

## 2023-04-03 PROCEDURE — 83540 ASSAY OF IRON: CPT

## 2023-04-03 PROCEDURE — 71045 X-RAY EXAM CHEST 1 VIEW: CPT

## 2023-04-03 PROCEDURE — C1713: CPT

## 2023-04-03 RX ORDER — SODIUM CHLORIDE 9 MG/ML
500 INJECTION, SOLUTION INTRAVENOUS ONCE
Refills: 0 | Status: COMPLETED | OUTPATIENT
Start: 2023-04-03 | End: 2023-04-03

## 2023-04-03 RX ORDER — ESCITALOPRAM OXALATE 10 MG/1
1 TABLET, FILM COATED ORAL
Qty: 0 | Refills: 0 | DISCHARGE
Start: 2023-04-03

## 2023-04-03 RX ORDER — METOPROLOL TARTRATE 50 MG
1 TABLET ORAL
Qty: 180 | Refills: 0
Start: 2023-04-03 | End: 2023-07-01

## 2023-04-03 RX ORDER — POLYETHYLENE GLYCOL 3350 17 G/17G
17 POWDER, FOR SOLUTION ORAL
Qty: 0 | Refills: 0 | DISCHARGE
Start: 2023-04-03

## 2023-04-03 RX ORDER — ASPIRIN/CALCIUM CARB/MAGNESIUM 324 MG
2 TABLET ORAL
Qty: 0 | Refills: 0 | DISCHARGE
Start: 2023-04-03

## 2023-04-03 RX ORDER — TAMSULOSIN HYDROCHLORIDE 0.4 MG/1
1 CAPSULE ORAL
Qty: 0 | Refills: 0 | DISCHARGE
Start: 2023-04-03

## 2023-04-03 RX ORDER — OXYCODONE HYDROCHLORIDE 5 MG/1
2.5 TABLET ORAL
Qty: 0 | Refills: 0 | DISCHARGE
Start: 2023-04-03

## 2023-04-03 RX ORDER — OXYCODONE HYDROCHLORIDE 5 MG/1
1 TABLET ORAL
Qty: 0 | Refills: 0 | DISCHARGE
Start: 2023-04-03

## 2023-04-03 RX ORDER — CARBIDOPA AND LEVODOPA 25; 100 MG/1; MG/1
1.5 TABLET ORAL
Qty: 0 | Refills: 0 | DISCHARGE
Start: 2023-04-03

## 2023-04-03 RX ORDER — ESCITALOPRAM OXALATE 10 MG/1
1 TABLET, FILM COATED ORAL
Qty: 0 | Refills: 0 | DISCHARGE

## 2023-04-03 RX ORDER — MAGNESIUM HYDROXIDE 400 MG/1
30 TABLET, CHEWABLE ORAL
Qty: 0 | Refills: 0 | DISCHARGE
Start: 2023-04-03

## 2023-04-03 RX ORDER — KETOROLAC TROMETHAMINE 30 MG/ML
15 SYRINGE (ML) INJECTION ONCE
Refills: 0 | Status: DISCONTINUED | OUTPATIENT
Start: 2023-04-03 | End: 2023-04-03

## 2023-04-03 RX ORDER — SENNA PLUS 8.6 MG/1
2 TABLET ORAL
Qty: 0 | Refills: 0 | DISCHARGE
Start: 2023-04-03

## 2023-04-03 RX ORDER — SODIUM CHLORIDE 9 MG/ML
250 INJECTION, SOLUTION INTRAVENOUS
Refills: 0 | Status: COMPLETED | OUTPATIENT
Start: 2023-04-03 | End: 2023-04-03

## 2023-04-03 RX ADMIN — SODIUM CHLORIDE 500 MILLILITER(S): 9 INJECTION, SOLUTION INTRAVENOUS at 10:28

## 2023-04-03 RX ADMIN — ESCITALOPRAM OXALATE 10 MILLIGRAM(S): 10 TABLET, FILM COATED ORAL at 13:20

## 2023-04-03 RX ADMIN — CARBIDOPA AND LEVODOPA 1.5 TABLET(S): 25; 100 TABLET ORAL at 14:34

## 2023-04-03 RX ADMIN — CARBIDOPA AND LEVODOPA 1.5 TABLET(S): 25; 100 TABLET ORAL at 05:02

## 2023-04-03 RX ADMIN — Medication 15 MILLIGRAM(S): at 14:33

## 2023-04-03 RX ADMIN — Medication 5 MILLIGRAM(S): at 09:55

## 2023-04-03 RX ADMIN — Medication 162 MILLIGRAM(S): at 13:20

## 2023-04-03 RX ADMIN — CARBIDOPA AND LEVODOPA 1.5 TABLET(S): 25; 100 TABLET ORAL at 11:24

## 2023-04-03 RX ADMIN — Medication 25 MILLIGRAM(S): at 15:38

## 2023-04-03 RX ADMIN — Medication 25 MILLIGRAM(S): at 05:02

## 2023-04-03 RX ADMIN — SODIUM CHLORIDE 500 MILLILITER(S): 9 INJECTION, SOLUTION INTRAVENOUS at 12:47

## 2023-04-03 NOTE — DISCHARGE NOTE NURSING/CASE MANAGEMENT/SOCIAL WORK - PATIENT PORTAL LINK FT
You can access the FollowMyHealth Patient Portal offered by [facility] by registering at the following website: [email or web address]. By joining [facility]’s FollowMyHealth portal, you will also be able to view your health information using other applications (apps) compatible with our system.

## 2023-04-03 NOTE — PROGRESS NOTE ADULT - PROVIDER SPECIALTY LIST ADULT
Cardiology
Hospitalist
Hospitalist
Cardiology
Hospitalist
Orthopedics
Orthopedics
Cardiology
Hospitalist
Orthopedics
Orthopedics

## 2023-04-03 NOTE — PROGRESS NOTE ADULT - REASON FOR ADMISSION
lt. hip fracture

## 2023-04-03 NOTE — DISCHARGE NOTE NURSING/CASE MANAGEMENT/SOCIAL WORK - NSDCPEFALRISK_GEN_ALL_CORE
For information on Fall & Injury Prevention, visit: [email or web address] OR  [email or web address] OR  https://www.cdc.gov/steadi/patient.html

## 2023-04-03 NOTE — PROGRESS NOTE ADULT - SUBJECTIVE AND OBJECTIVE BOX
[facility], THE HEART [facility]ENTER                                   [address] [place] [address]                                                      PHONE: [phone]                                                         FAX: [phone]  [email or web address]  ---------------------------------------------------------------------------------------------------------------------------------    Overnight events/patient complaints: Patient without cardiac symptoms this AM. Patient remains in afib with HRs in the 90s-110s.      amiodarone (Hives)  Cipro (Muscle Pain)  diltiazem (Hives)  strawberry (Rash)    MEDICATIONS  (STANDING):  aspirin enteric coated 162 milliGRAM(s) Oral daily  carbidopa/levodopa  25/100 1.5 Tablet(s) Oral <User Schedule>  chlorhexidine 2% Cloths 1 Application(s) Topical every 12 hours  enoxaparin Injectable 40 milliGRAM(s) SubCutaneous every 24 hours  ergocalciferol 19199 Unit(s) Oral every week  escitalopram 10 milliGRAM(s) Oral daily  metoprolol tartrate 25 milliGRAM(s) Oral every 8 hours  senna 2 Tablet(s) Oral at bedtime  tamsulosin 0.4 milliGRAM(s) Oral at bedtime    MEDICATIONS  (PRN):  acetaminophen     Tablet .. 650 milliGRAM(s) Oral every 6 hours PRN Mild Pain (1 - 3)  aluminum hydroxide/magnesium hydroxide/simethicone Suspension 30 milliLiter(s) Oral four times a day PRN Indigestion  bisacodyl Suppository 10 milliGRAM(s) Rectal daily PRN If no bowel movement  magnesium hydroxide Suspension 30 milliLiter(s) Oral daily PRN Constipation  metoprolol tartrate Injectable 5 milliGRAM(s) IV Push every 6 hours PRN HR >120  ondansetron Injectable 4 milliGRAM(s) IV Push every 6 hours PRN Nausea and/or Vomiting  oxyCODONE    IR 5 milliGRAM(s) Oral every 4 hours PRN Severe Pain (7 - 10)  oxyCODONE    IR 2.5 milliGRAM(s) Oral every 4 hours PRN Moderate Pain (4 - 6)  polyethylene glycol 3350 17 Gram(s) Oral daily PRN Constipation      Vital Signs Last 24 Hrs  T(C): 36.9 (03 Apr 2023 10:38), Max: 36.9 (03 Apr 2023 10:38)  T(F): 98.5 (03 Apr 2023 10:38), Max: 98.5 (03 Apr 2023 10:38)  HR: 100 (03 Apr 2023 10:38) (69 - 100)  BP: 88/55 (03 Apr 2023 10:38) (88/55 - 107/63)  BP(mean): --  RR: 18 (03 Apr 2023 10:38) (18 - 19)  SpO2: 99% (03 Apr 2023 10:38) (95% - 99%)    Parameters below as of 03 Apr 2023 10:38  Patient On (Oxygen Delivery Method): nasal cannula  O2 Flow (L/min): 2    ICU Vital Signs Last 24 Hrs  GARIMA ANGIE  I&O's Detail    02 Apr 2023 07:01  -  03 Apr 2023 07:00  --------------------------------------------------------  IN:    Oral Fluid: 360 mL  Total IN: 360 mL    OUT:    Voided (mL): 700 mL  Total OUT: 700 mL    Total NET: -340 mL        Drug Dosing Weight  GARIMA LAKEAGHER      PHYSICAL EXAM:  General: NAD  HEENT: Head; normocephalic, atraumatic.  Eyes: EOMI, conjunctiva normal  Neck: Supple  CARDIOVASCULAR: Irregularly irregular rhythm, regular rate, S1 S2, No murmurs or gallops  LUNGS: No respiratory distress, normal inspiratory effort  ABDOMEN: Soft, nontender, non-distended, +bowel sounds  EXTREMITIES: Trace edema b/l, no cyanosis   SKIN: warm and dry  NEURO: Alert  PSYCH: normal affect.        LABS:                        8.2    8.06  )-----------( 239      ( 03 Apr 2023 06:08 )             25.2     04-03    136  |  101  |  23.6<H>  ----------------------------<  92  4.0   |  27.0  |  0.53    Ca    7.6<L>      03 Apr 2023 06:08  Phos  3.1     04-03  Mg     1.9     04-03      [facility]    RADIOLOGY & ADDITIONAL STUDIES:    INTERPRETATION OF TELEMETRY (personally reviewed): Afib with HRs in the 90-110s    ASSESSMENT AND PLAN:  88y Female with history of Parkinson's disease, paroxysmal atrial fibrillation refused anticoagulation, bio MVR who presents with left hip fracture after fall s/p IM nailing, hospital course c/b paroxysmal atrial fibrillation with controlled VR, intraop hypotension requiring pressor and fluid boluses, anemia likely from acute surgical blood loss, and new onset shortness of breath.     AF with RVR  - patients HR in the 90s-110s  - would increase metoprolol to 50mg BID  - has declined anticoagulation in the past  - eventual outpatient Watchman evaluation    Thank you for letting [facility] to assist in the management of this patient. Please call with any questions.

## 2023-04-24 ENCOUNTER — APPOINTMENT (OUTPATIENT)
Dept: ORTHOPEDIC SURGERY | Facility: CLINIC | Age: 88
End: 2023-04-24
Payer: MEDICARE

## 2023-04-24 VITALS
HEIGHT: 64 IN | WEIGHT: 120 LBS | TEMPERATURE: 98.1 F | DIASTOLIC BLOOD PRESSURE: 68 MMHG | BODY MASS INDEX: 20.49 KG/M2 | SYSTOLIC BLOOD PRESSURE: 107 MMHG | HEART RATE: 82 BPM

## 2023-04-24 PROCEDURE — 73502 X-RAY EXAM HIP UNI 2-3 VIEWS: CPT | Mod: 26,LT

## 2023-04-24 PROCEDURE — 99024 POSTOP FOLLOW-UP VISIT: CPT

## 2023-04-24 NOTE — HISTORY OF PRESENT ILLNESS
[Clean/Dry/Intact] : clean, dry and intact [No Sign of Infection] : is showing no signs of infection [Erythema] : not erythematous [Discharge] : absent of discharge [Swelling] : not swollen [Dehiscence] : not dehisced [de-identified] : s/p intramedullary nail, left intertrochanteric hip fracture. Left hip arthrogram. 3/28/23 [de-identified] : 87 yo f s/p intramedullary nail, left intertrochanteric hip fracture. Left hip arthrogram. 3/28/23 here for pop visit.  Patient is accompanied by family member.  She c/o pain in the groin and back.  She is receiving PT at [facility] rehab.  She comes to office in a wheelchair.  She reports that therapy has been slow due to medical issues including Afib and Hypotension.  [de-identified] : Pt is wheelchair.\par able to move toes, sensation grossly intact, no calf swelling or tenderness. [de-identified] : xrays pelvis/left hip  2 views: hardware in place [de-identified] : patient will continue PT WBAT, ROM, strengthening, modalities.\par Since it is difficult for her to come to office, she can has xrays repeated in 1 month at the rehab facility and have xrays sent to this office, then f/u with a telehealth visit. \par Patient and family member agree with this plan.

## 2023-07-19 ENCOUNTER — APPOINTMENT (OUTPATIENT)
Dept: ORTHOPEDIC SURGERY | Facility: CLINIC | Age: 88
End: 2023-07-19
Payer: MEDICARE

## 2023-07-19 PROCEDURE — 99214 OFFICE O/P EST MOD 30 MIN: CPT

## 2023-07-19 PROCEDURE — 73522 X-RAY EXAM HIPS BI 3-4 VIEWS: CPT

## 2023-07-19 NOTE — HISTORY OF PRESENT ILLNESS
[de-identified] : Patient is s/p right THR 8/14/20.\par She is doing very well today regarding her right hip.\par No pain or complaints regarding her right hip.\par She uses a walker to help with balance secondary to Parkinson's Disease. \par She is s/p intramedullary nail, left intertrochanteric hip fracture. Left hip arthrogram. 3/28/23 by Dr. Orlando Ram.\par She is receiving PT at [facility] re[facility]. \par She comes to office in a wheelchair. \par She reports that therapy has been slow due to medical issues including Afib and Hypotension. \par She has recurrent left groin pain which is aggravated with getting up from a chair and trying to stand and walk.

## 2023-07-19 NOTE — REASON FOR VISIT
[Follow-Up Visit] : a follow-up visit for [Family Member] : family member [FreeTextEntry2] : right THR; left hip ORIF

## 2023-07-19 NOTE — PHYSICAL EXAM
[de-identified] : This patient is now healing from a fracture in her left hip as given in her history where she had a gamma type nail placed and had cement injected up to the nail into the head and neck area.  She had this once previously on her opposite hip which later caused her constant pain and it was converted to a total hip replacement at this time but she is having the pain in her left groin and left hip.  Her motion on today's exam shows.   flexion right hip 120 degrees left hip 110 degrees, abduction right hip 65 degrees left hip 60 degrees, abduction right hip 10 degrees left hip 10 degrees, external rotation of the right hip 65 degrees left hip 45 degrees, internal rotation right hip 10 degrees left hip 10 degrees.  She has no pain in her right hip but she does have the pain in her groin with the motion in the left hip and discomfort over her greater trochanteric area where the nail of the right was placed.\par \par  [de-identified] : An AP of the pelvis and lateral of the right left hips show a right hip with a cemented femoral component and porous shell for a hybrid total hip replacement in good position and well fixed.\par \par The left hip does show the gamma type nail in place with cement around the nail but the nail retracted back through the retracted somewhat back.  There is a considerable amount of cement in the area of the neck and is unclear whether or not this is a united fracture or nonunion.

## 2023-07-19 NOTE — DISCUSSION/SUMMARY
[de-identified] : This patient is doing very well as far as her hips are concerned her probably continues to be her Parkinson's disorder but she has no pain and is ambulating well as far as her hips.  Her problem with ambulation is a balance and the Parkinson's disorder.

## 2023-10-19 ENCOUNTER — APPOINTMENT (OUTPATIENT)
Dept: DERMATOLOGY | Facility: CLINIC | Age: 88
End: 2023-10-19
Payer: MEDICARE

## 2023-10-19 PROCEDURE — 99212 OFFICE O/P EST SF 10 MIN: CPT

## 2023-11-03 NOTE — H&P PST ADULT - VENOUS THROMBOEMBOLISM HISTORY
----- Message from Calvin Shi sent at 11/3/2023  2:15 PM CDT -----  Contact: self  Pt is asking for an return call in reference to apt that was scheduled for her she is needing to know the date and time of apt or the number to the facility  ,please call back at .[phone] CJ     (0) indicator not present

## 2023-11-29 ENCOUNTER — APPOINTMENT (OUTPATIENT)
Dept: ORTHOPEDIC SURGERY | Facility: CLINIC | Age: 88
End: 2023-11-29
Payer: MEDICARE

## 2023-11-29 VITALS
DIASTOLIC BLOOD PRESSURE: 70 MMHG | HEART RATE: 60 BPM | SYSTOLIC BLOOD PRESSURE: 113 MMHG | WEIGHT: 120 LBS | BODY MASS INDEX: 20.49 KG/M2 | HEIGHT: 64 IN

## 2023-11-29 DIAGNOSIS — Z87.81 PERSONAL HISTORY OF (HEALED) TRAUMATIC FRACTURE: ICD-10-CM

## 2023-11-29 PROCEDURE — 73522 X-RAY EXAM HIPS BI 3-4 VIEWS: CPT

## 2023-11-29 PROCEDURE — 99214 OFFICE O/P EST MOD 30 MIN: CPT

## 2024-01-01 NOTE — H&P PST ADULT - TRANSFUSION REACTION, PREVIOUS, PROFILE
no Continue trophic feeds of EHM/donor human milk. Advance feeds by 15-20ml/Kg/d as tolerated. When baby tolerating >/= 60ml/Kg/d, recommend changing to 24cal/oz EHM+HMF(2packs/50ml) or 24cal/oz donor human milk + HMF (2packs/50ml), then continue to advance by 15-20ml/Kg/d as tolerated to provide >/=120cal/Kg/d & 4.0gm prot/Kg/d.

## 2024-01-23 ENCOUNTER — OUTPATIENT (OUTPATIENT)
Dept: OUTPATIENT SERVICES | Facility: HOSPITAL | Age: 89
LOS: 1 days | End: 2024-01-23
Payer: MEDICARE

## 2024-01-23 VITALS
HEART RATE: 66 BPM | OXYGEN SATURATION: 99 % | SYSTOLIC BLOOD PRESSURE: 129 MMHG | TEMPERATURE: 98 F | WEIGHT: 108.03 LBS | DIASTOLIC BLOOD PRESSURE: 79 MMHG | HEIGHT: 64 IN | RESPIRATION RATE: 18 BRPM

## 2024-01-23 DIAGNOSIS — G20 PARKINSON'S DISEASE: ICD-10-CM

## 2024-01-23 DIAGNOSIS — Z98.890 OTHER SPECIFIED POSTPROCEDURAL STATES: ICD-10-CM

## 2024-01-23 DIAGNOSIS — Z29.9 ENCOUNTER FOR PROPHYLACTIC MEASURES, UNSPECIFIED: ICD-10-CM

## 2024-01-23 DIAGNOSIS — Z95.818 PRESENCE OF OTHER CARDIAC IMPLANTS AND GRAFTS: Chronic | ICD-10-CM

## 2024-01-23 DIAGNOSIS — Z01.818 ENCOUNTER FOR OTHER PREPROCEDURAL EXAMINATION: ICD-10-CM

## 2024-01-23 DIAGNOSIS — Z96.641 PRESENCE OF RIGHT ARTIFICIAL HIP JOINT: Chronic | ICD-10-CM

## 2024-01-23 DIAGNOSIS — Z90.710 ACQUIRED ABSENCE OF BOTH CERVIX AND UTERUS: Chronic | ICD-10-CM

## 2024-01-23 DIAGNOSIS — Z90.89 ACQUIRED ABSENCE OF OTHER ORGANS: Chronic | ICD-10-CM

## 2024-01-23 DIAGNOSIS — Z90.49 ACQUIRED ABSENCE OF OTHER SPECIFIED PARTS OF DIGESTIVE TRACT: Chronic | ICD-10-CM

## 2024-01-23 DIAGNOSIS — N89.8 OTHER SPECIFIED NONINFLAMMATORY DISORDERS OF VAGINA: Chronic | ICD-10-CM

## 2024-01-23 DIAGNOSIS — Z87.81 PERSONAL HISTORY OF (HEALED) TRAUMATIC FRACTURE: Chronic | ICD-10-CM

## 2024-01-23 DIAGNOSIS — Z95.2 PRESENCE OF PROSTHETIC HEART VALVE: Chronic | ICD-10-CM

## 2024-01-23 DIAGNOSIS — M19.90 UNSPECIFIED OSTEOARTHRITIS, UNSPECIFIED SITE: ICD-10-CM

## 2024-01-23 DIAGNOSIS — I48.0 PAROXYSMAL ATRIAL FIBRILLATION: ICD-10-CM

## 2024-01-23 DIAGNOSIS — Z98.890 OTHER SPECIFIED POSTPROCEDURAL STATES: Chronic | ICD-10-CM

## 2024-01-23 DIAGNOSIS — M16.11 UNILATERAL PRIMARY OSTEOARTHRITIS, RIGHT HIP: ICD-10-CM

## 2024-01-23 LAB
A1C WITH ESTIMATED AVERAGE GLUCOSE RESULT: 5.5 % — SIGNIFICANT CHANGE UP (ref 4–5.6)
ANION GAP SERPL CALC-SCNC: 11 MMOL/L — SIGNIFICANT CHANGE UP (ref 5–17)
BLD GP AB SCN SERPL QL: NEGATIVE — SIGNIFICANT CHANGE UP
BUN SERPL-MCNC: 20 MG/DL — SIGNIFICANT CHANGE UP (ref 7–23)
CALCIUM SERPL-MCNC: 9.3 MG/DL — SIGNIFICANT CHANGE UP (ref 8.4–10.5)
CHLORIDE SERPL-SCNC: 106 MMOL/L — SIGNIFICANT CHANGE UP (ref 96–108)
CO2 SERPL-SCNC: 27 MMOL/L — SIGNIFICANT CHANGE UP (ref 22–31)
CREAT SERPL-MCNC: 0.68 MG/DL — SIGNIFICANT CHANGE UP (ref 0.5–1.3)
EGFR: 85 ML/MIN/1.73M2 — SIGNIFICANT CHANGE UP
ESTIMATED AVERAGE GLUCOSE: 111 MG/DL — SIGNIFICANT CHANGE UP (ref 68–114)
GLUCOSE SERPL-MCNC: 61 MG/DL — LOW (ref 70–99)
HCT VFR BLD CALC: 46.9 % — HIGH (ref 34.5–45)
HGB BLD-MCNC: 14.9 G/DL — SIGNIFICANT CHANGE UP (ref 11.5–15.5)
MCHC RBC-ENTMCNC: 29.4 PG — SIGNIFICANT CHANGE UP (ref 27–34)
MCHC RBC-ENTMCNC: 31.8 GM/DL — LOW (ref 32–36)
MCV RBC AUTO: 92.7 FL — SIGNIFICANT CHANGE UP (ref 80–100)
MRSA PCR RESULT.: SIGNIFICANT CHANGE UP
NRBC # BLD: 0 /100 WBCS — SIGNIFICANT CHANGE UP (ref 0–0)
PLATELET # BLD AUTO: 300 K/UL — SIGNIFICANT CHANGE UP (ref 150–400)
POTASSIUM SERPL-MCNC: 4 MMOL/L — SIGNIFICANT CHANGE UP (ref 3.5–5.3)
POTASSIUM SERPL-SCNC: 4 MMOL/L — SIGNIFICANT CHANGE UP (ref 3.5–5.3)
RBC # BLD: 5.06 M/UL — SIGNIFICANT CHANGE UP (ref 3.8–5.2)
RBC # FLD: 12.5 % — SIGNIFICANT CHANGE UP (ref 10.3–14.5)
RH IG SCN BLD-IMP: POSITIVE — SIGNIFICANT CHANGE UP
S AUREUS DNA NOSE QL NAA+PROBE: SIGNIFICANT CHANGE UP
SODIUM SERPL-SCNC: 144 MMOL/L — SIGNIFICANT CHANGE UP (ref 135–145)
WBC # BLD: 7.64 K/UL — SIGNIFICANT CHANGE UP (ref 3.8–10.5)
WBC # FLD AUTO: 7.64 K/UL — SIGNIFICANT CHANGE UP (ref 3.8–10.5)

## 2024-01-23 PROCEDURE — 86900 BLOOD TYPING SEROLOGIC ABO: CPT

## 2024-01-23 PROCEDURE — 83036 HEMOGLOBIN GLYCOSYLATED A1C: CPT

## 2024-01-23 PROCEDURE — 87640 STAPH A DNA AMP PROBE: CPT

## 2024-01-23 PROCEDURE — 86901 BLOOD TYPING SEROLOGIC RH(D): CPT

## 2024-01-23 PROCEDURE — 80048 BASIC METABOLIC PNL TOTAL CA: CPT

## 2024-01-23 PROCEDURE — G0463: CPT

## 2024-01-23 PROCEDURE — 86850 RBC ANTIBODY SCREEN: CPT

## 2024-01-23 PROCEDURE — 87641 MR-STAPH DNA AMP PROBE: CPT

## 2024-01-23 PROCEDURE — 85027 COMPLETE CBC AUTOMATED: CPT

## 2024-01-23 RX ORDER — ESCITALOPRAM OXALATE 10 MG/1
1 TABLET, FILM COATED ORAL
Refills: 0 | DISCHARGE

## 2024-01-23 RX ORDER — ASPIRIN/CALCIUM CARB/MAGNESIUM 324 MG
0 TABLET ORAL
Refills: 0 | DISCHARGE

## 2024-01-23 NOTE — H&P PST ADULT - NSICDXPASTSURGICALHX_GEN_ALL_CORE_FT
PAST SURGICAL HISTORY:  H/O mitral valve replacement 2012--[facility] [employer] -- Serial # [id number], Model # [id number]-60, implant date: May 3, 2012  Implanted Physician : Italo Jones -[facility].    H/O resection of small bowel     H/O total hysterectomy     History of femur fracture Repaired in 6/ 2019    History of right hip replacement     History of tonsillectomy and adenoidectomy     Other specified noninflammatory disorder of vagina s/p excision of vaginal lesion- benign - 2019    Presence of Watchman left atrial appendage closure device     S/P hysterectomy     S/P small bowel resection     S/P tonsillectomy

## 2024-01-23 NOTE — H&P PST ADULT - HISTORY OF PRESENT ILLNESS
88 year old female with PMH of Parkinson's, presents to PST prior to scheduled   with Dr. Pop on 2/12/24. PMhx Parkinson's Disease, paroxysmal A-Fib s/p Watchmen's procedure, OA s/p R THR 8/14/20, s/p IMN of L IT hip fx 3/28/23 (c/b prolonged hospital and rehab stay) now with continued L hip pain. Pt states since discharge from PT program she has experienced a gradual reduction in her functional  status and is requiring more assist for ADLs and functional mobility.    . PShx     65 y/o F with worsening b/l lumbar radiculopathy despite pain medication, PT and injections. On exam, patient has a + SLR on the right side, decreased sensation of right L5 and S1 dermatome, weakness of b/l hip flexors, decreased great toe extension b/l. CT lumbar spine demonstrates multilevel spondylosis most significant at L4-L5 with grade 1 anterolisthesis, stenosis and compression of exiting nerve roots as well as L5-S1 b/l foraminal stenosis. also she has moderate to severe L3-4 DDD and foraminal stenosis We will obtain a lumbar xray with flexion and extension to r/o instability. At this point surgery is her best option. She needs medical and cardiac clearance. She also needs neurology clearance due to her strokes. The decision for surgery was mutual after discussing risks benefits and alternatives. 88 year old female with presents to PST prior to scheduled Left Hip total Replacement or Eric Removal of Hip Hardware with Dr. Pop on 2/12/24. PMhx Parkinson's Disease, paroxysmal A-Fib s/p Watchmen's procedure, OA s/p R THR 8/14/20, s/p IMN of L IT hip fx 3/28/23 (c/b prolonged hospital and rehab stay) now with continued L hip pain. Pt states since discharge from PT program she has experienced a gradual reduction in her functional.      65 y/o F with worsening b/l lumbar radiculopathy despite pain medication, PT and injections. On exam, patient has a + SLR on the right side, decreased sensation of right L5 and S1 dermatome, weakness of b/l hip flexors, decreased great toe extension b/l. CT lumbar spine demonstrates multilevel spondylosis most significant at L4-L5 with grade 1 anterolisthesis, stenosis and compression of exiting nerve roots as well as L5-S1 b/l foraminal stenosis. also she has moderate to severe L3-4 DDD and foraminal stenosis We will obtain a lumbar xray with flexion and extension to r/o instability. At this point surgery is her best option. She needs medical and cardiac clearance. She also needs neurology clearance due to her strokes. The decision for surgery was mutual after discussing risks benefits and alternatives. 88 year old female with presents to PST prior to scheduled Left Hip total Replacement or Eric Removal of Hip Hardware with Dr. Pop on 2/12/24. PMhx Parkinson's Disease, paroxysmal A-Fib s/p Watchmen's procedure, OA s/p R THR 8/14/20, s/p IMN of L IT hip fx 3/28/23 (c/b prolonged hospital and rehab stay) now with continued L hip pain. Pt states since discharge from PT program she has experienced a gradual reduction in her functional. She states she has not been able to stand for long period. Denies any chest pain, palpitations, SOB, N/V, fever or chills.

## 2024-01-23 NOTE — H&P PST ADULT - ASSESSMENT
DASI Score:  DASI Activity: able to go up one flight of stairs or walk 1-2 blocks with out difficulty  Loose or removable teeth: denies  DASI Score:  DASI Activity: Pt present in wheelcahir able to go up one flight of stairs or walk 1-2 blocks with out difficulty  Loose or removable teeth: denies  DASI Score: 3.79  DASI Activity: Pt present in wheelchair, able to perform some ADL's with assistance   Loose or removable teeth: upper and lower partials   CAPRINI SCORE [CLOT]    AGE RELATED RISK FACTORS                                                       MOBILITY RELATED FACTORS  [ ] Age 41-60 years                                            (1 Point)                  [ ] Bed rest                                                        (1 Point)  [ ] Age: 61-74 years                                           (2 Points)                 [ ] Plaster cast                                                   (2 Points)  [x Age= 75 years                                              (3 Points)                 [ ] Bed bound for more than 72 hours                 (2 Points)    DISEASE RELATED RISK FACTORS                                               GENDER SPECIFIC FACTORS  [ ] Edema in the lower extremities                       (1 Point)                  [ ] Pregnancy                                                     (1 Point)  [ ] Varicose veins                                               (1 Point)                  [ ] Post-partum < 6 weeks                                   (1 Point)             [ ] BMI > 25 Kg/m2                                            (1 Point)                  [ ] Hormonal therapy  or oral contraception          (1 Point)                 [ ] Sepsis (in the previous month)                        (1 Point)                  [ ] History of pregnancy complications                 (1 point)  [ ] Pneumonia or serious lung disease                                               [ ] Unexplained or recurrent                     (1 Point)           (in the previous month)                               (1 Point)  [ ] Abnormal pulmonary function test                     (1 Point)                 SURGERY RELATED RISK FACTORS  [ ] Acute myocardial infarction                              (1 Point)                 [ ]  Section                                             (1 Point)  [ ] Congestive heart failure (in the previous month)  (1 Point)               [ ] Minor surgery                                                  (1 Point)   [ ] Inflammatory bowel disease                             (1 Point)                 [ ] Arthroscopic surgery                                        (2 Points)  [ ] Central venous access                                      (2 Points)                [ ] General surgery lasting more than 45 minutes   (2 Points)       [ ] Stroke (in the previous month)                          (5 Points)               [x ] Elective arthroplasty                                         (5 Points)                                                                                                                                               HEMATOLOGY RELATED FACTORS                                                 TRAUMA RELATED RISK FACTORS  [ ] Prior episodes of VTE                                     (3 Points)                [ ] Fracture of the hip, pelvis, or leg                       (5 Points)  [ ] Positive family history for VTE                         (3 Points)                 [ ] Acute spinal cord injury (in the previous month)  (5 Points)  [ ] Prothrombin 25310 A                                     (3 Points)                 [ ] Paralysis  (less than 1 month)                             (5 Points)  [ ] Factor V Leiden                                             (3 Points)                  [ ] Multiple Trauma within 1 month                        (5 Points)  [ ] Lupus anticoagulants                                     (3 Points)                                                           [ ] Anticardiolipin antibodies                               (3 Points)                                                       [ ] High homocysteine in the blood                      (3 Points)                                             [ ] Other congenital or acquired thrombophilia      (3 Points)                                                [ ] Heparin induced thrombocytopenia                  (3 Points)                                          Total Score [   ]

## 2024-01-23 NOTE — H&P PST ADULT - NEGATIVE GENERAL GENITOURINARY SYMPTOMS
no hematuria/no flank pain L/no flank pain R/no urine discoloration/no dysuria/no urinary hesitancy/normal urinary frequency

## 2024-01-23 NOTE — H&P PST ADULT - NEGATIVE OPHTHALMOLOGIC SYMPTOMS
no diplopia/no lacrimation L/no lacrimation R/no blurred vision L/no blurred vision R/no discharge L/no discharge R/no pain L/no pain R/no irritation L/no irritation R

## 2024-01-23 NOTE — H&P PST ADULT - PROBLEM SELECTOR PLAN 1
Pt. scheduled for Left Hip total Replacement or Eric Removal of Hip Hardware with Dr. Pop on 2/12/24.  Pre-op instructions given, all questions answered.  Surgical Soap given.  Labs: CBC, BMP, T&S, MRSA, A1C

## 2024-01-23 NOTE — H&P PST ADULT - NEGATIVE NEUROLOGICAL SYMPTOMS
no transient paralysis/no paresthesias/no generalized seizures/no focal seizures/no syncope/no vertigo/no headache/no confusion

## 2024-01-23 NOTE — H&P PST ADULT - MUSCULOSKELETAL
Bilateral lower extremities/no joint swelling/no joint erythema/no joint warmth/no calf tenderness/decreased ROM details…

## 2024-02-05 PROBLEM — Z98.890 OTHER SPECIFIED POSTPROCEDURAL STATES: Chronic | Status: ACTIVE | Noted: 2024-01-23

## 2024-02-07 ENCOUNTER — APPOINTMENT (OUTPATIENT)
Dept: ORTHOPEDIC SURGERY | Facility: CLINIC | Age: 89
End: 2024-02-07
Payer: MEDICARE

## 2024-02-07 DIAGNOSIS — S32.511A FRACTURE OF SUPERIOR RIM OF RIGHT PUBIS, INITIAL ENCOUNTER FOR CLOSED FRACTURE: ICD-10-CM

## 2024-02-07 PROCEDURE — 99214 OFFICE O/P EST MOD 30 MIN: CPT | Mod: 25

## 2024-02-07 PROCEDURE — 73030 X-RAY EXAM OF SHOULDER: CPT | Mod: RT

## 2024-02-07 PROCEDURE — 73522 X-RAY EXAM HIPS BI 3-4 VIEWS: CPT

## 2024-02-11 NOTE — PATIENT PROFILE ADULT - HAVE YOU EXPERIENCED A TRAUMATIC EVENT?
[facility] Detail Level: Detailed Depth Of Biopsy: dermis Was A Bandage Applied: Yes Size Of Lesion In Cm: 1 X Size Of Lesion In Cm: 0 Biopsy Type: H and E Biopsy Method: double edge Personna blade Anesthesia Type: 1% lidocaine with epinephrine Anesthesia Volume In Cc: 0.5 Hemostasis: Aluminum Chloride and Electrocautery Wound Care: Petrolatum Dressing: bandage Destruction After The Procedure: No Type Of Destruction Used: Curettage Curettage Text: The wound bed was treated with curettage after the biopsy was performed. Cryotherapy Text: The wound bed was treated with cryotherapy after the biopsy was performed. Electrodesiccation Text: The wound bed was treated with electrodesiccation after the biopsy was performed. Electrodesiccation And Curettage Text: The wound bed was treated with electrodesiccation and curettage after the biopsy was performed. Silver Nitrate Text: The wound bed was treated with silver nitrate after the biopsy was performed. Lab: [id number] Lab Facility: [id number] Consent: Written consent was obtained and risks were reviewed including but not limited to scarring, infection, bleeding, scabbing, incomplete removal, nerve damage and allergy to anesthesia. Post-Care Instructions: I reviewed with the patient in detail post-care instructions. Patient is to keep the biopsy site dry overnight, and then apply bacitracin twice daily until healed. Patient may apply hydrogen peroxide soaks to remove any crusting. Notification Instructions: Patient will be notified of biopsy results. However, patient instructed to call the office if not contacted within 2 weeks. Billing Type: Third-Party Bill Information: Selecting Yes will display possible errors in your note based on the variables you have selected. This validation is only offered as a suggestion for you. PLEASE NOTE THAT THE VALIDATION TEXT WILL BE REMOVED WHEN YOU FINALIZE YOUR NOTE. IF YOU WANT TO FAX A PRELIMINARY NOTE YOU WILL NEED TO TOGGLE THIS TO 'NO' IF YOU DO NOT WANT IT IN YOUR FAXED NOTE. no

## 2024-02-12 ENCOUNTER — APPOINTMENT (OUTPATIENT)
Dept: ORTHOPEDIC SURGERY | Facility: HOSPITAL | Age: 89
End: 2024-02-12

## 2024-02-12 RX ORDER — CELECOXIB 200 MG/1
200 CAPSULE ORAL DAILY
Qty: 30 | Refills: 1 | Status: ACTIVE | COMMUNITY
Start: 2024-02-12 | End: 1900-01-01

## 2024-02-15 ENCOUNTER — APPOINTMENT (OUTPATIENT)
Dept: ORTHOPEDIC SURGERY | Facility: CLINIC | Age: 89
End: 2024-02-15
Payer: MEDICARE

## 2024-02-15 PROCEDURE — 99204 OFFICE O/P NEW MOD 45 MIN: CPT

## 2024-02-15 PROCEDURE — 99214 OFFICE O/P EST MOD 30 MIN: CPT

## 2024-02-15 PROCEDURE — 73030 X-RAY EXAM OF SHOULDER: CPT | Mod: RT

## 2024-02-15 RX ORDER — MELOXICAM 7.5 MG/1
7.5 TABLET ORAL
Qty: 30 | Refills: 2 | Status: ACTIVE | COMMUNITY
Start: 2024-02-15 | End: 1900-01-01

## 2024-02-27 ENCOUNTER — APPOINTMENT (OUTPATIENT)
Dept: ORTHOPEDIC SURGERY | Facility: CLINIC | Age: 89
End: 2024-02-27

## 2024-03-04 ENCOUNTER — TRANSCRIPTION ENCOUNTER (OUTPATIENT)
Age: 89
End: 2024-03-04

## 2024-03-13 ENCOUNTER — APPOINTMENT (OUTPATIENT)
Dept: ORTHOPEDIC SURGERY | Facility: CLINIC | Age: 89
End: 2024-03-13
Payer: MEDICARE

## 2024-03-13 VITALS
WEIGHT: 108 LBS | HEART RATE: 132 BPM | SYSTOLIC BLOOD PRESSURE: 122 MMHG | HEIGHT: 64 IN | BODY MASS INDEX: 18.44 KG/M2 | DIASTOLIC BLOOD PRESSURE: 85 MMHG

## 2024-03-13 PROCEDURE — 73030 X-RAY EXAM OF SHOULDER: CPT | Mod: RT

## 2024-03-13 PROCEDURE — 99213 OFFICE O/P EST LOW 20 MIN: CPT

## 2024-03-13 NOTE — PHYSICAL EXAM
[de-identified] : Ms. GARIMA ADAMS is sitting comfortably in the exam room  RIGHT shoulder -Skin is intact, no swelling, ecchymosis -FE: , ER: , IR: , ABD:  -Able to make a fist -Sensation is intact median, radial, ulnar, axillary nerves -Motor is intact median, radial, ulnar, axillary nerves -Hand is warm and well-perfused, Palpable radial and ulnar pulses  [de-identified] :  X-rays of the right shoulder were taken in the office today 3/13/24. AP, Scap Y, Axillary. xrays show good overall alignment. There is interval healing at the proximal humerus fracture.

## 2024-03-13 NOTE — DISCUSSION/SUMMARY
[de-identified] : 88-year-old woman with a right proximal humerus fracture, approximately 6 weeks out, treated nonoperatively.   -X-ray and physical exam findings were discussed with the patient -5lb weight limit right UE -Physical therapy: work on ROM of the right shoulder -Follow-up in 6 weeks with x-rays of the right shoulder at that time.  -All of the patient's questions and concerns were addressed.

## 2024-03-13 NOTE — HISTORY OF PRESENT ILLNESS
[de-identified] : Ms. GARIMA ADAMS is a 88 year old woman presents today for follow up evaluation of a right proximal humerus fracture sustained on 1/30/24, being treated nonoperatively. Since her last visit she states she is feeling better. She is participating in PT twice a week at home. She notes mild pain at the right shoulder.

## 2024-04-08 NOTE — PHYSICAL EXAM
[de-identified] : Ms. GARIMA ADAMS is sitting comfortably in the exam room  RIGHT shoulder -Skin is intact, no swelling, ecchymosis -ROM limited due to pain  -Able to make a fist -Sensation is intact median, radial, ulnar, axillary nerves -Motor is intact median, radial, ulnar, axillary nerves -Hand is warm and well-perfused, Palpable radial and ulnar pulses  [de-identified] :  X-rays of the right shoulder were taken in the office today 2/15/24. AP, Scap Y, Axillary.  X-rays show a proximal humerus fracture with varus alignment.  The fracture extends into the greater tuberosity.  The glenohumeral joint is well reduced.  There is glenohumeral arthritis.

## 2024-04-10 ENCOUNTER — APPOINTMENT (OUTPATIENT)
Dept: ORTHOPEDIC SURGERY | Facility: CLINIC | Age: 89
End: 2024-04-10
Payer: MEDICARE

## 2024-04-10 VITALS — BODY MASS INDEX: 18.44 KG/M2 | HEIGHT: 64 IN | WEIGHT: 108 LBS

## 2024-04-10 PROCEDURE — 99213 OFFICE O/P EST LOW 20 MIN: CPT

## 2024-04-10 PROCEDURE — 73522 X-RAY EXAM HIPS BI 3-4 VIEWS: CPT

## 2024-04-18 ENCOUNTER — APPOINTMENT (OUTPATIENT)
Dept: ORTHOPEDIC SURGERY | Facility: CLINIC | Age: 89
End: 2024-04-18
Payer: MEDICARE

## 2024-04-18 VITALS
DIASTOLIC BLOOD PRESSURE: 71 MMHG | HEART RATE: 80 BPM | BODY MASS INDEX: 18.44 KG/M2 | HEIGHT: 64 IN | WEIGHT: 108 LBS | SYSTOLIC BLOOD PRESSURE: 102 MMHG

## 2024-04-18 PROCEDURE — 73030 X-RAY EXAM OF SHOULDER: CPT | Mod: RT

## 2024-04-18 PROCEDURE — 99213 OFFICE O/P EST LOW 20 MIN: CPT

## 2024-05-10 ENCOUNTER — RX ONLY (RX ONLY)
Age: 89
End: 2024-05-10

## 2024-05-10 ENCOUNTER — OFFICE (OUTPATIENT)
Dept: URBAN - METROPOLITAN AREA CLINIC 6 | Facility: CLINIC | Age: 89
Setting detail: OPHTHALMOLOGY
End: 2024-05-10
Payer: MEDICARE

## 2024-05-10 DIAGNOSIS — H16.223: ICD-10-CM

## 2024-05-10 DIAGNOSIS — H02.403: ICD-10-CM

## 2024-05-10 DIAGNOSIS — H35.3131: ICD-10-CM

## 2024-05-10 PROBLEM — H52.7 REFRACTIVE ERROR: Status: ACTIVE | Noted: 2024-05-10

## 2024-05-10 PROCEDURE — 92134 CPTRZ OPH DX IMG PST SGM RTA: CPT | Performed by: OPHTHALMOLOGY

## 2024-05-10 PROCEDURE — 92014 COMPRE OPH EXAM EST PT 1/>: CPT | Performed by: OPHTHALMOLOGY

## 2024-05-10 ASSESSMENT — LID POSITION - PTOSIS
OS_PTOSIS: LUL 1+
OD_PTOSIS: RUL 1+

## 2024-05-10 ASSESSMENT — CONFRONTATIONAL VISUAL FIELD TEST (CVF)
OD_FINDINGS: FULL
OS_FINDINGS: FULL

## 2024-06-07 NOTE — HISTORY OF PRESENT ILLNESS
[de-identified] : Ms. GARIMA ADAMS is a 89 year old woman presents today for follow up evaluation of a right proximal humerus fracture sustained on 1/30/24, being treated nonoperatively. Since her last visit she states she is feeling better. She has been participating in PT twice a week.

## 2024-06-07 NOTE — DISCUSSION/SUMMARY
[de-identified] : 89-year-old woman with a right proximal humerus fracture, approximately 11 weeks out, treated nonoperatively.   -X-ray and physical exam findings were discussed with the patient -WBAT right UE -Physical therapy: work on ROM of the right shoulder -Follow-up in 3 months with x-rays of the right shoulder at that time.  -All of the patient's questions and concerns were addressed.

## 2024-06-07 NOTE — PHYSICAL EXAM
Patient information on fall and injury prevention [de-identified] : Ms. GARIMA ADAMS is sitting comfortably in the exam room  RIGHT shoulder -Skin is intact, no swelling, ecchymosis -FE: 90 , ER: 10 , IR: Hip , ABD: 90 -Able to make a fist -Sensation is intact median, radial, ulnar, axillary nerves -Motor is intact median, radial, ulnar, axillary nerves -Hand is warm and well-perfused, Palpable radial and ulnar pulses  [de-identified] :  X-rays of the right shoulder were taken in the office today 4/18/24. AP, Scap Y, Axillary. xrays show good overall alignment. There is interval healing at the proximal humerus fracture.

## 2024-06-26 ENCOUNTER — APPOINTMENT (OUTPATIENT)
Dept: ORTHOPEDIC SURGERY | Facility: CLINIC | Age: 89
End: 2024-06-26
Payer: MEDICARE

## 2024-06-26 DIAGNOSIS — S42.91XA FRACTURE OF RIGHT SHOULDER GIRDLE, PART UNSPECIFIED, INITIAL ENCOUNTER FOR CLOSED FRACTURE: ICD-10-CM

## 2024-06-26 DIAGNOSIS — Z96.641 PRESENCE OF RIGHT ARTIFICIAL HIP JOINT: ICD-10-CM

## 2024-06-26 DIAGNOSIS — S42.201A UNSPECIFIED FRACTURE OF UPPER END OF RIGHT HUMERUS, INITIAL ENCOUNTER FOR CLOSED FRACTURE: ICD-10-CM

## 2024-06-26 DIAGNOSIS — Z87.81 PERSONAL HISTORY OF (HEALED) TRAUMATIC FRACTURE: ICD-10-CM

## 2024-06-26 PROCEDURE — 73502 X-RAY EXAM HIP UNI 2-3 VIEWS: CPT

## 2024-06-26 PROCEDURE — 99213 OFFICE O/P EST LOW 20 MIN: CPT

## 2024-06-30 ENCOUNTER — NON-APPOINTMENT (OUTPATIENT)
Age: 89
End: 2024-06-30

## 2024-07-18 ENCOUNTER — APPOINTMENT (OUTPATIENT)
Dept: ORTHOPEDIC SURGERY | Facility: CLINIC | Age: 89
End: 2024-07-18

## 2024-08-08 ENCOUNTER — APPOINTMENT (OUTPATIENT)
Dept: DERMATOLOGY | Facility: CLINIC | Age: 89
End: 2024-08-08

## 2024-08-08 PROCEDURE — 99213 OFFICE O/P EST LOW 20 MIN: CPT

## 2025-01-13 ENCOUNTER — APPOINTMENT (OUTPATIENT)
Dept: GASTROENTEROLOGY | Facility: CLINIC | Age: 89
End: 2025-01-13
Payer: MEDICARE

## 2025-01-13 VITALS
DIASTOLIC BLOOD PRESSURE: 68 MMHG | BODY MASS INDEX: 21.34 KG/M2 | WEIGHT: 125 LBS | SYSTOLIC BLOOD PRESSURE: 106 MMHG | HEIGHT: 64 IN

## 2025-01-13 DIAGNOSIS — R13.10 DYSPHAGIA, UNSPECIFIED: ICD-10-CM

## 2025-01-13 DIAGNOSIS — R15.9 FULL INCONTINENCE OF FECES: ICD-10-CM

## 2025-01-13 PROCEDURE — 99204 OFFICE O/P NEW MOD 45 MIN: CPT

## 2025-01-23 ENCOUNTER — APPOINTMENT (OUTPATIENT)
Dept: NEUROLOGY | Facility: CLINIC | Age: 89
End: 2025-01-23

## 2025-02-27 ENCOUNTER — APPOINTMENT (OUTPATIENT)
Dept: GASTROENTEROLOGY | Facility: CLINIC | Age: 89
End: 2025-02-27
Payer: MEDICARE

## 2025-02-27 VITALS
HEART RATE: 102 BPM | DIASTOLIC BLOOD PRESSURE: 64 MMHG | SYSTOLIC BLOOD PRESSURE: 116 MMHG | OXYGEN SATURATION: 94 % | HEIGHT: 64 IN | RESPIRATION RATE: 14 BRPM | TEMPERATURE: 97.5 F

## 2025-02-27 DIAGNOSIS — R19.8 OTHER SPECIFIED SYMPTOMS AND SIGNS INVOLVING THE DIGESTIVE SYSTEM AND ABDOMEN: ICD-10-CM

## 2025-02-27 DIAGNOSIS — Z71.9 COUNSELING, UNSPECIFIED: ICD-10-CM

## 2025-02-27 DIAGNOSIS — K59.09 OTHER CONSTIPATION: ICD-10-CM

## 2025-02-27 DIAGNOSIS — R15.9 FULL INCONTINENCE OF FECES: ICD-10-CM

## 2025-02-27 DIAGNOSIS — R13.10 DYSPHAGIA, UNSPECIFIED: ICD-10-CM

## 2025-02-27 PROCEDURE — 99204 OFFICE O/P NEW MOD 45 MIN: CPT

## 2025-02-27 RX ORDER — ESCITALOPRAM OXALATE 10 MG/1
10 TABLET, FILM COATED ORAL
Refills: 0 | Status: ACTIVE | COMMUNITY

## 2025-02-27 RX ORDER — CARBIDOPA AND LEVODOPA 25; 100 MG/1; MG/1
25-100 TABLET ORAL
Refills: 0 | Status: ACTIVE | COMMUNITY

## 2025-02-27 RX ORDER — MINERAL OIL 100 G/100ML
ENEMA RECTAL DAILY
Qty: 1 | Refills: 0 | Status: ACTIVE | COMMUNITY
Start: 2025-02-27 | End: 1900-01-01

## 2025-02-27 RX ORDER — LORATADINE 5 MG
17 TABLET,CHEWABLE ORAL DAILY
Qty: 1 | Refills: 2 | Status: ACTIVE | COMMUNITY
Start: 2025-02-27 | End: 1900-01-01

## 2025-05-14 ENCOUNTER — NON-APPOINTMENT (OUTPATIENT)
Age: 89
End: 2025-05-14

## 2025-05-14 ENCOUNTER — APPOINTMENT (OUTPATIENT)
Dept: NEUROLOGY | Facility: CLINIC | Age: 89
End: 2025-05-14
Payer: MEDICARE

## 2025-05-14 VITALS
SYSTOLIC BLOOD PRESSURE: 108 MMHG | DIASTOLIC BLOOD PRESSURE: 67 MMHG | WEIGHT: 125 LBS | HEIGHT: 64 IN | BODY MASS INDEX: 21.34 KG/M2 | HEART RATE: 69 BPM

## 2025-05-14 DIAGNOSIS — G20.A1 PARKINSON'S DISEASE WITHOUT DYSKINESIA, WITHOUT MENTION OF FLUCTUATIONS: ICD-10-CM

## 2025-05-14 PROCEDURE — 99204 OFFICE O/P NEW MOD 45 MIN: CPT

## 2025-05-14 PROCEDURE — G2211 COMPLEX E/M VISIT ADD ON: CPT

## 2025-05-19 ENCOUNTER — APPOINTMENT (OUTPATIENT)
Dept: GASTROENTEROLOGY | Facility: CLINIC | Age: 89
End: 2025-05-19

## 2025-05-19 ENCOUNTER — OUTPATIENT (OUTPATIENT)
Dept: OUTPATIENT SERVICES | Facility: HOSPITAL | Age: 89
LOS: 1 days | End: 2025-05-19
Payer: MEDICARE

## 2025-05-19 DIAGNOSIS — N89.8 OTHER SPECIFIED NONINFLAMMATORY DISORDERS OF VAGINA: Chronic | ICD-10-CM

## 2025-05-19 DIAGNOSIS — Z90.710 ACQUIRED ABSENCE OF BOTH CERVIX AND UTERUS: Chronic | ICD-10-CM

## 2025-05-19 DIAGNOSIS — Z90.49 ACQUIRED ABSENCE OF OTHER SPECIFIED PARTS OF DIGESTIVE TRACT: Chronic | ICD-10-CM

## 2025-05-19 DIAGNOSIS — Z87.81 PERSONAL HISTORY OF (HEALED) TRAUMATIC FRACTURE: Chronic | ICD-10-CM

## 2025-05-19 DIAGNOSIS — R13.10 DYSPHAGIA, UNSPECIFIED: ICD-10-CM

## 2025-05-19 DIAGNOSIS — Z96.641 PRESENCE OF RIGHT ARTIFICIAL HIP JOINT: Chronic | ICD-10-CM

## 2025-05-19 DIAGNOSIS — Z98.890 OTHER SPECIFIED POSTPROCEDURAL STATES: Chronic | ICD-10-CM

## 2025-05-19 DIAGNOSIS — Z95.818 PRESENCE OF OTHER CARDIAC IMPLANTS AND GRAFTS: Chronic | ICD-10-CM

## 2025-05-19 DIAGNOSIS — Z90.89 ACQUIRED ABSENCE OF OTHER ORGANS: Chronic | ICD-10-CM

## 2025-05-19 DIAGNOSIS — Z95.2 PRESENCE OF PROSTHETIC HEART VALVE: Chronic | ICD-10-CM

## 2025-05-19 PROCEDURE — 74230 X-RAY XM SWLNG FUNCJ C+: CPT

## 2025-05-19 PROCEDURE — 74230 X-RAY XM SWLNG FUNCJ C+: CPT | Mod: 26

## 2025-05-19 NOTE — SWALLOW VFSS/MBS ASSESSMENT ADULT - ADDITIONAL RECOMMENDATIONS
**Frequent oral care to promote uncompromised oral environment  **Monitor pulmonary status  **Dysphagia tx to address moderate pharyngeal dysphagia with thin liquids  **Cut solids into small pieces, moisten to facilitate improved mastication

## 2025-05-19 NOTE — SWALLOW VFSS/MBS ASSESSMENT ADULT - LARYNGEAL PENETRATION DURING THE SWALLOW - SILENT
observed, above the cords without retrieval, trace amount with small sips observed, above the cords without retrieval, trace amount with small sips, 2/5 trials; no penetration, 3/5 trials when small sip utilized

## 2025-05-19 NOTE — SWALLOW VFSS/MBS ASSESSMENT ADULT - SLP PERTINENT HISTORY OF CURRENT PROBLEM
Pt reports sensation of "choking and tickling" both with and without PO intake on her saliva. She notes occasional choking with PO intake. Pt currently takes a regular/thin liquid diet.

## 2025-05-19 NOTE — SWALLOW VFSS/MBS ASSESSMENT ADULT - DIAGNOSTIC IMPRESSIONS
Mild oral dysphagia. Pharyngeal stage of swallow grossly functional for solids. Moderate pharyngeal dysphagia with thin liquids.     Oral stage marked by reduced lingual coordination resulting in posterior loss of bolus to the hypopharynx. Consistent piecemeal deglutition.     Pharyngeal stage marked by varied delay in swallow trigger. Mildly reduced tongue base retraction resulting in trace vallecular stasis which reduced and/or cleared with subsequent swallow. Reduced upper/lower airway protection and laryngeal elevation with thin liquids. Silent aspiration of larger cup sips of thins. Inconsistent and trace penetration above the cords without retrieval with use of small, single sips of thins, considered functional. Compensatory postures attempted, however pt unable to complete 2* motoric deficits.

## 2025-05-19 NOTE — SWALLOW VFSS/MBS ASSESSMENT ADULT - ROSENBEK'S PENETRATION ASPIRATION SCALE
(1) no aspiration, contrast does not enter airway 3/3 trials; (3) = contrast remains above cords, residue remains, 2/5 trials, small ,single sips (1) no aspiration, contrast does ont enter airway, 3/5 trials with small, single sips/(8) contrast passes glottis, visible subglottic residue remains, absent patient response (aspiration)

## 2025-05-19 NOTE — SWALLOW VFSS/MBS ASSESSMENT ADULT - SLP GENERAL OBSERVATIONS
Pt received in Radiology suite in wheelchair with HHA present, A&A, oriented, reduced vocal volume and intelligibility c/w dx of Parkinson's disease, pain 0/10 pre/post

## 2025-05-19 NOTE — SWALLOW VFSS/MBS ASSESSMENT ADULT - UNSUCCESSFUL STRATEGIES TRIALED DURING PROCEDURE
Pt with difficulty completing postural compensations 2* motoric deficits; postures that were able to be completed were unsuccessful in eliminating silent aspiration/chin tuck/head turn to the left

## 2025-05-19 NOTE — SWALLOW VFSS/MBS ASSESSMENT ADULT - ORAL PHASE
Uncontrolled bolus / spillover in hypopharynx Uncontrolled bolus / spillover in lev-pharynx/Uncontrolled bolus / spillover in hypopharynx

## (undated) DEVICE — VENODYNE/SCD SLEEVE CALF MEDIUM

## (undated) DEVICE — SUT VICRYL PLUS 0 27" CT-2 UNDYED

## (undated) DEVICE — SUT NYLON 3-0 18" PS-2

## (undated) DEVICE — DRAPE SPLIT SHEET 77" X 108"

## (undated) DEVICE — SUT MONOCRYL 2-0 27" SH UNDYED

## (undated) DEVICE — DRAPE C ARM C-ARMOUR

## (undated) DEVICE — DRAPE TOWEL BLUE 17" X 24"

## (undated) DEVICE — GUIDEWIRE SYNTHES 3.2MM X 400MM

## (undated) DEVICE — PACK EXTREMITY

## (undated) DEVICE — FRAZIER SUCTION TIP 10FR

## (undated) DEVICE — DRSG WEBRIL 6"

## (undated) DEVICE — DRAPE MAYO STAND 23"

## (undated) DEVICE — SUT MONOCRYL 3-0 27" PS-2 UNDYED

## (undated) DEVICE — ELCTR GROUNDING PAD ADULT COVIDIEN

## (undated) DEVICE — GLV 8 PROTEXIS (BLUE)

## (undated) DEVICE — DRAPE C ARM UNIVERSAL

## (undated) DEVICE — GLV 7.5 PROTEXIS (WHITE)